# Patient Record
Sex: MALE | Race: WHITE | NOT HISPANIC OR LATINO | Employment: FULL TIME | URBAN - METROPOLITAN AREA
[De-identification: names, ages, dates, MRNs, and addresses within clinical notes are randomized per-mention and may not be internally consistent; named-entity substitution may affect disease eponyms.]

---

## 2017-01-16 ENCOUNTER — ALLSCRIPTS OFFICE VISIT (OUTPATIENT)
Dept: OTHER | Facility: OTHER | Age: 42
End: 2017-01-16

## 2017-06-15 DIAGNOSIS — Z13.1 ENCOUNTER FOR SCREENING FOR DIABETES MELLITUS: ICD-10-CM

## 2017-06-15 DIAGNOSIS — E78.5 HYPERLIPIDEMIA: ICD-10-CM

## 2018-01-14 VITALS
WEIGHT: 266.25 LBS | OXYGEN SATURATION: 98 % | HEIGHT: 70 IN | TEMPERATURE: 98.7 F | BODY MASS INDEX: 38.12 KG/M2 | HEART RATE: 90 BPM | SYSTOLIC BLOOD PRESSURE: 126 MMHG | DIASTOLIC BLOOD PRESSURE: 78 MMHG

## 2018-01-15 NOTE — PROGRESS NOTES
Assessment    1  Back pain (724 5) (M54 9)   2  Neck pain (723 1) (M54 2)   3  Hand numbness (782 0) (R20 0)   4  Abnormal brain MRI (793 0) (R90 89)    Plan  Abnormal brain MRI    · * MRI BRAIN W WO CONTRAST; Status:Need Information - Financial Authorization; Requested for:[id number];    Perform:[facility] Radiology; CKK:[id number]; Last Updated By:Angelia Godoy; 3/22/2016 1:46:16 PM;Ordered; For:Abnormal brain MRI; Ordered By:Rakan Knowles;   · Follow-up visit in 6 months Evaluation and Treatment  Follow-up  Status: Complete   Done: 45JUN0625   Ordered; For: Abnormal brain MRI; Ordered By: Eric Oquendo Performed:  Due: 63BCL2526; Last Updated By: Se Nguyen; 3/22/2016 1:50:20 PM  Back pain    · *1 - [address] Physician Referral  Consult  Status: Active  Requested  for: 25GLB9572   Ordered; For: Back pain; Ordered By: Eric Oquendo Performed:  Due: 08GRE8229; Last Updated By: Se Nguyen; 3/22/2016 1:47:11 PM  Care Summary provided  : Yes    Discussion/Summary  Discussion Summary:   1) Neck pain/upper back pain and syrinx: Patient is following up with neurosurgery in regards to the syrinx  In regard to the degenerative disc disease at this time no surgery indicated per neurosurgery  We'll refer patient to pain management  Patient has tried physical therapy in the past but was not very helpful  2) Low-back pain: No significant disc herniation noted  Patient mentions of neurosurgery informed him about it at that cord syndrome  Patient following up with neurosurgery  Recommend continue with home exercises and we'll refer patient to pain management  3) Syrinx on MRI of the cervical spine: MRI cervical and thoracic spine with contrast did not show any evidence of any mass lesions  Patient periodically following up with neurosurgery  4) Impaired vibration sensation in the feet: ? Due to wear and tear and age related  EMG was normal and there is no evidence of neuropathy   blood work for treatable causes of peripheral neuropathy was unremarkable  Protective footwear is recommended  Good foot hygiene is recommended  Patient Guardian understands agrees: The treatment plan was reviewed with the patient/guardian  The patient/guardian understands and agrees with the treatment plan   Counseling Documentation With Imm: The patient was counseled regarding instructions for management, patient and family education  total time of encounter was 25 minutes and 15 minutes was spent counseling  Chief Complaint  Chief Complaint Free Text Note Form: Follow up appt      History of Present Illness  HPI: Patient is coming for follow-up after about a year in regards to back pain, neck pain, tingling  Brief History:   Patient is a 36 y/o gentle man who is coming for follow up for his back pain, neck pain and tingling  Patient says that he used to see a neurologist 10-15 years ago, however due to insurance issues could not follow up with him  Back Pain: Back pain has a chronic low back pain, for several years which at times has acute exacerbation  Patient says that the pain is dull pain but can get sharp, 7-8/10, occasionally radiates into his right lower extremity with occasional tinging in the right leg  His symptoms are worse on lifting things  Patient denies any urinary or bowel accidents  Patient had disc disease in the past and the neurosurgeon had denies any surgery several years ago  PT did not help him  He says he had pain injections several years ago  Neck Pain: Patient says that neck pain is not as bad as the back pain  He gets it once in a while, 4/10  He mentions he was evaluated several years ago and had a syrinx in the past and used to get MRI in past every six months  He all used to have diplopia on turning his head, now that not there anymore  He denies any radiation into the upper extremities     Numbness in hands: Patient complaints of tingling and numbness that is intermittent of both hands mostly in the first three digits  He also noticed that he gets weak in his hands  He notices that it gets worse on driving  He mentions that years ago he used to use splints for possibly right hand for carpal tunnel syndrome  He denies any prolonged episodes of blurred vision  Patient also complaints of headaches that are occasional, once a week  Headache are describe as mild, all over the head, sometimes is pressure like, 4/10  Occasionally associated with nausea and photophobia  He denies any phonophobia, vomiting, flashes of light  He takes Tylenol or Advil that help him  He had taken Imitrex in the past but that caused side effects like nausea  He tried to PT but was not helping him  MRI brain showed single hyperintense lesion in right frontal lobe  MRI cervical spine shows syrinx ( History of syrinx for a long time)  MRI lumbar spine showed minimal degenerative changes  EMG showed no evidence of CTS and no neuropathy, possible mild left L4 radiculopathy  Patient did not obtain the blood work ordered at the last visit as it was difficult for him to go fasting due to his work timings  Interval history:  Patient says that neck pain or back pain has not changed significantly since the last visit  He tried PT in the past but did not help  He mentions that he was told by neurosurgery that he has tethered cord  He is following with neurosurgery for the syrinx  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, 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 to caudal T7 level approaching 5 MM greatest transverse dimension limited contrasted MRI thoracic spine is suggested to exclude the likelihood of neoplasm  MRI thoracic spine with and without contrast on 4/22/15 showed thoracic syrinx D4-T7 is overall smaller than on previous study and no underlying enhancing mass lesion, mild spondylolytic changes are stable, no new disc herniation   MRI of the cervical spine on 4/22/15 showed multilevel cervical spondylosis similar to prior study, no new disc herniation, small syrinx at C6-C7 unchanged, no abnormal enhancement, no intramedullary mass lesion  MRI brain on 8/29/15 showed single tiny T2 and FLAIR hyperintense focus involving subcortical white matter might to posterior right frontal convexity and although nonspecific findings there are suspicious for mild chronic microangiopathic changes which may be associated with migraine headache and clinical correlation is recommended  ESR 1, Lyme and negative, LDL cholesterol 132, random glucose 86, ALT 48, AST 24, SPEP normal no monoclonal bands noted, folate 11 2, SSA, SSB normal, WILLIE screen negative, TSH 0 57, vitamin B 12 4/26, vitamin E 10 9, vitamin B 616 2, vitamin E 10 9  Patient started on cholesterol pills (simvastatin) per his PCP      Review of Systems  Neurological ROS:   Constitutional: recent weight gain  HEENT: sinus problems  Cardiovascular:  no chest pain or pressure, no palpitations present, the heart rate was not rapid or irregular, no swelling in the arms or legs, no poor circulation  Respiratory: shortness of breath with or without exertion  Gastrointestinal:  no nausea, no vomiting, no diarrhea, no abdominal pain, no changes in bowel habits, no melena, no loss of bowel control  Genitourinary:  no incontinence, no feelings of urinary urgency, no increase in frequency, no urinary hesitancy, no dysuria, no hematuria  Musculoskeletal: arthralgias, myalgias and head/neck/back pain  Integumentary  no masses, no rash, no skin lesions, no livedo reticularis  Psychiatric:  no anxiety, no depression, no mood swings, no psychiatric hospitalizations, no sleep problems  Endocrine   no unusual weight loss or gain, no excessive urination, no excessive thirst, no hair loss or gain, no hot or cold intolerance, no menstrual period change or irregularity, no loss of sexual ability or drive, no erection difficulty, no nipple discharge  Hematologic/Lymphatic:  no unusual bleeding, no tendency for easy bruising, no clotting skin or lumps  Neurological General: headache  Neurological Mental Status: memory problems  Neurological Cranial Nerves:  no blurry or double vision, no loss of vision, no face drooping, no facial numbness or weakness, no taste or smell loss/changes, no hearing loss or ringing, no vertigo or dizziness, no dysphagia, no slurred speech  Neurological Motor findings include:  no tremor, no twitching, no cramping(pre/post exercise), no atrophy  Neurological Coordination:  no unsteadiness, no vertigo or dizziness, no clumsiness, no problems reaching for objects  Neurological Sensory: numbness and tingling  Neurological Gait:  no difficulty walking, not falling to one side, no sensation of being pushed, has not had falls  ROS Reviewed:   ROS reviewed  Active Problems    1  Back pain (724 5) (M54 9)   2  Callus of foot (700) (L84)   3  Esophageal reflux (530 81) (K21 9)   4  Facial weakness (781 94) (R29 810)   5  Hand numbness (782 0) (R20 0)   6  Headache (784 0) (R51)   7  Hordeolum externum of left eye (373 11) (H00 016)   8  Hyperlipidemia (272 4) (E78 5)   9  Left facial numbness (782 0) (R20 0)   10  Lipid screening (V77 91) (Z13 220)   11  Lower extremity numbness (782 0) (R20 0)   12  Lump of skin (782 2) (R22 9)   13  Neck pain (723 1) (M54 2)   14  Need for Tdap vaccination (V06 1) (Z23)   15  Nontraumatic tear of right rotator cuff (727 61) (M75 101)   16  Right foot pain (729 5) (M79 671)   17  Right shoulder pain (719 41) (M25 511)   18  Syrinx of spinal cord (336 0) (G95 0)   19  Tendinitis of left rotator cuff (726 10) (M75 82)   20  Tethered spinal cord (742 59) (Q06 8)    Past Medical History    1  History of Anxiety and depression (300 4) (F41 8)   2  History of Difficulty breathing (786 09) (R06 89)   3  History of Fracture of both hands (815 00) (S62 91XA,S62 92XA)   4   History of Hair loss (704 00) (L65 9)   5  History of depression (V11 8) (Z86 59)   6  History of esophageal reflux (V12 79) (Z87 19)   7  History of heartburn (V12 79) (Z87 898)   8  History of hiatal hernia (V12 79) (Z87 19)   9  History of shortness of breath (V13 89) (Z87 898)   10  History of sleep disturbance (V13 89) (Z87 898)   11  History of Indigestion (536 8) (K30)   12  History of Migraine headache (346 90) (G43 909)   13  History of Nasal congestion (478 19) (R09 81)   14  History of Night sweats (780 8) (R61)   15  History of Starting and stopping of urinary stream during micturition (788 69) (R39 19)   16  History of Stomach problems (536 9) (K31 9)   17  History of Weight gain (783 1) (R63 5)  Active Problems And Past Medical History Reviewed: The active problems and past medical history were reviewed and updated today  Surgical History    1  History of Appendectomy   2  History of Complete Colonoscopy   3  History of Diagnostic Esophagogastroduodenoscopy   4  History of Hand Surgery   5  History of Nasal Septal Deviation Repair  Surgical History Reviewed: The surgical history was reviewed and updated today  Family History    1  No pertinent family history    2  Family history of malignant neoplasm (V16 9) (Z80 9)    3  Family history of Carcinoma Of The Pancreas    4  Family history of    5  Family history of dementia (V17 2) (Z81 8)    6  Family history of Colon Cancer (V16 0)    7  Family history of   Family History Reviewed: The family history was reviewed and updated today  Social History    · Alcohol use (V49 89) (Z78 9)   · Caffeine use (V49 89) (F15 90)   · Current every day smoker (305 1) (F17 200)   · Currently sexually active   · High school or GED   · History of drug use (305 93) (F19 21)   · Lives with roommates   · No known STD risk factors   · Occupation   · Recently    · Social alcohol use (Z78 9)  Social History Reviewed:  The social history was reviewed and updated today  Current Meds   1  Ibuprofen 200 MG Oral Tablet; TAKE 4 TABLET Every 4 hours PRN For pain; Therapy: (Recorded:08Apr2015) to Recorded   2  Migraine Formula 250-250-65 MG Oral Tablet; TAKE 2 TABLET Every 4 hours PRN for HA; Therapy: (Recorded:08Apr2015) to Recorded   3  Omeprazole 20 MG Oral Capsule Delayed Release; TAKE 1 CAPSULE DAILY; Therapy: 27RPM3695 to (Evaluate:98Sbk7867)  Requested for: 59TJO2047; Last   Rx:96Phd2789 Ordered   4  Polymyxin B-Trimethoprim 19022-1 1 UNIT/ML-% Ophthalmic Solution; INSTILL 1 DROP   IN AFFECTED EYE(S) EVERY 4-6 HOURS; Therapy: 80XDH9903 to (Evaluate:19Jan2016)  Requested for: 12Jan2016; Last   Rx:12Jan2016 Ordered   5  Simvastatin 20 MG Oral Tablet; Take 1 tablet daily; Therapy: 53OMZ0672 to (Geroldine January)  Requested for: 19LWQ5120; Last   Rx:30Jan2016 Ordered  Medication List Reviewed: The medication list was reviewed and updated today  Allergies    1  No Known Drug Allergies    Vitals  Signs [Data Includes: Current Encounter]   Recorded: 76EPG2951 01:03PM   Temperature: 98 1 F  Heart Rate: 99  Respiration: 18  Systolic: 156  Diastolic: 67  Height: 5 ft 10 in  Weight: 275 lb 12 oz  BMI Calculated: 39 57  BSA Calculated: 2 39    Physical Exam        General examination:  Patient is awake and alert  Eyes: Conjunctiva and sclera are clear  HEENT: External examination is normal  Neck: Supple  Lungs: Clear to auscultation bilaterally  CVS: S1, S2 heard  Abdomen: Soft, nontender  Extremities: No clubbing, edema, cyanosis  Skin: No rashes  Neurological examination:   Mental status: Patient is awake, alert, oriented to time place and person  Attention, concentration, fund of knowledge is intact  Language: No evidence of aphasia or dysarthria  Memory: Repetition 3/3 and recall 3/3  Cranial nerves: Pupils equal, reacting to light and accommodation  Extraocular movements intact  Visual fields are full   Fundi are difficult to visualize bilaterally  Facial sensation is intact  No facial weakness is noted  Finger rub test is intact bilaterally  Tongue and uvula are in midline  Gag is intact  Shoulder shrug is 5/5 bilaterally  Motor examination: Tone is normal  No atrophy noted  Strength is 5/5 throughout  Sensory examination: Light touch is intact  Pinprick  temperature are impaired in a stocking distribution up to the mid calves bilaterally  Vibration is impaired at the level of the toes  Proprioception is intact  Tinel's and Phalen's sign are negative    Deep tendon reflexes: 2 throughout  Plantars are downgoing bilaterally  Coordination: Finger-nose test and finger tapping intact bilaterally  Heel-to-shin test is intact bilaterally  Gait: Patient has a normal base and stride  Results/Data  Diagnostic Studies Reviewed:   MRI Review 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, 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 to caudal T7 level approaching 5 MMN greatest transverse dimension limited contrasted MRI thoracic spine is suggested to exclude the likelihood of neoplasm  MRI thoracic spine with and without contrast on 4/22/15 showed thoracic syrinx D4-T7 is overall smaller than on previous study and no underlying enhancing mass lesion, mild spondylolytic changes are stable, no new disc herniation  MRI of the cervical spine on 4/22/15 showed multilevel cervical spondylosis similar to prior study, no new disc herniation, small syrinx at C6-C7 unchanged, no abnormal enhancement, no intramedullary mass lesion  MRI brain on 8/29/15 showed single tiny T2 and FLAIR hyperintense focus involving subcortical white matter might to posterior right frontal convexity and although nonspecific findings there are suspicious for mild chronic microangiopathic changes which may be associated with migraine headache and clinical correlation is recommended         Diagnostic Review ESR 1, Lyme and negative, LDL cholesterol 132, random glucose 86, ALT 48, AST 24, SPEP normal no monoclonal bands noted, folate 11 2, SSA, SSB normal, WILLIE screen negative, TSH 0 57, vitamin B 12/4/26, vitamin E 10 9, vitamin B 616 2, vitamin E 10 9  Results   * MRI Brain With and Without Contrast [id number] 11:48AM Chrystine Magic     Test Name Result Flag Reference    [address] W/O (Report)     [facility] [address] [place];;[place];[id number]   08/29/2015 1152   08/29/2015 1225   N/A     MRI BRAIN WITH AND WITHOUT CONTRAST     INDICATION- Headaches, previous left-sided facial weakness has resolved   COMPARISON- 12/5/2014 MRI     TECHNIQUE-   Sagittal T1, axial T2, axial FLAIR, axial T1, axial gradient imaging,   axial diffusion  Sagittal, axial and coronal T1 weighted images were   obtained  Exam was performed pre- and postintravenous administration of   12 cc Gadavist      IMAGE QUALITY-  Diagnostic  FINDINGS-     BRAIN PARENCHYMA-    Again evident is single tiny focus of T2 and FLAIR hyperintensity   involving the subcortical white matter of the mid to posterior right   frontal convexity  The appearance is nonspecific  It may represent   mild chronic microangiopathic change such as may occur with migraine   headaches  Clinical correlation recommended  There is no discrete mass effect or midline shift  Brainstem and   cerebellum demonstrate normal signal  Diffusion imaging is   unremarkable  VENTRICLES- Normal      POSTCONTRAST IMAGING-    Postcontrast imaging of the brain demonstrates no abnormal enhancement  SELLA AND PITUITARY GLAND- Normal      ORBITS- Normal      PARANASAL SINUSES- The paranasal sinuses are clear  VASCULATURE-    Evaluation of the major intracranial vasculature demonstrates   appropriate flow voids       CALVARIUM AND SKULL BASE- Normal      EXTRACRANIAL SOFT TISSUES- Normal      IMPRESSION-   Single tiny T2 and FLAIR hyperintense focus involving subcortical white   matter mid to posterior right frontal convexity  Although nonspecific,   finding suspicious for mild chronic microangiopathic change which may   be associated with migraine headaches  Clinical correlation recommended     Otherwise, unremarkable exam     Similar to previous MRI study             Transcribed on- [id number]     - SUMMER Armendariz MD   [place] Radiologist- SUMMER Armendariz MD   Electronically Signed- SUMMER Armendariz MD   Released Date Time- 08/31/15 1304   ------------------------------------------------------------------------------   [id number]^JOLANTA HERBERT   [id number]^JOLANTA HERBERT     (1) LYME ANTIBODY PROFILE W/REFLEX TO WESTERN BLOT 11Aug2015 09:44AM Zamorano Speedy Order Number: [id number]     Test Name Result Flag Reference   Lyme IgG Quant 0 63  0 00-0 79   LYME IGG Negative  Negative   Lyme IgM Quant 0 42  0 00-0 79   LYME IGM Negative  Negative   LYME ANTIBODY PROFILE, EIA INTERPRETATION    NEGATIVE (0 00 - 0 79)  Absence of detectable Borrelia IgG and/or IgM antibodies  A negative  result does not exclude the possibility of Borrelia infection  If early  Lyme disease is suspected, a second sample should be collected and  tested 4 weeks after initial testing  EQUIVOCAL (0 80 - 1 19)  Current testing guidelines recommend that all equivocal samples be  supplemented by further testing  Sample forwarded to reference lab for  Western Blot assay  POSITIVE ( > or equal to 1 20)  Presence of detectable Borrelia IgG and/or IgM antibodies  Current  testing guidelines recommend that all positive samples be supplemented  by further testing  Sample forwarded to reference lab for Western Blot  assay       (1) SED RATE 11Aug2015 09:44AM Zamorano Speedy Order Number: [id number]     Test Name Result Flag Reference   SED RATE 1 mm/hour  0-9     * MRI Spine Cervical With and Without Contrast 22Apr2015 08:32PM Bobby Leak     Test Name Result Flag Reference   MRI CSpine  W/ & W/O (Report)     [facility] [address];;[place];[id number]   04/22/2015 2040 04/22/2015 2150   N/A     Please note that there is a voice recognition error in the impression  In the 2nd impression the word SEARINGS should be replaced with a   SYRINX  Transcribed on- [id number]   SUMMER Disla MD   Addendum [place] Radiologist- SUMMER Long MD   Addendum Electronically SUMMER Alegria MD   Addendum Released Date Time- 05/19/15 1449   ------------------------------------------------------------------------------     MRI CERVICAL SPINE WITH AND WITHOUT CONTRAST     INDICATION- 724 5, 336 0, 782 0, 784 0 History- increasing chronic   low back pain with numbness in both upper and lower extremities     COMPARISON- 12/05/2014     TECHNIQUE- Sagittal T1, sagittal T2, sagittal inversion recovery,   axial 2D merge and axial T2  Sagittal T1 and axial T1   postcontrast  12 mL of Gadavist was administered without immediate   consequence  IMAGE QUALITY- Diagnostic  FINDINGS-     ALIGNMENT- Normal alignment of the cervical spine  No compression   fracture  No subluxation  No scoliosis  MARROW SIGNAL- Normal marrow signal is identified within the   visualized bony structures  No discrete marrow lesion  CERVICAL AND VISUALIZED UPPER THORACIC CORD- The appearance of the   spinal cord is stable with a small spindle-shaped non-expansile   intramedullary linear T2 hyperintensity at the C6-C7 intervertebral   disc space, measuring approximately 12 mm in maximal craniocaudal   dimension, compatible with a small syrinx cavity  No abnormal   enhancement  PREVERTEBRAL AND PARASPINAL SOFT TISSUES- Small retention cyst in the   floor the right maxillary sinus       VISUALIZED POSTERIOR FOSSA- The visualized posterior fossa   demonstrates no abnormal signal      CERVICAL DISC SPACES-        C2-C3- Normal      C3-C4- Normal      C4-C5- There is a diffuse disk bulge  No significant central canal or   neural foraminal narrowing  This level is similar to the prior study  C5-C6- There is a disc osteophyte complex with a small superimposed   right paracentral disc protrusion  Mild tricompartmental narrowing  This level is similar to the prior study  C6-C7- There is a disc osteophyte complex with mild tricompartmental   narrowing  This level is similar to the prior study  C7-T1- There is a small right paracentral disc herniation, protrusion   type  There is mild central canal and right neural foraminal   narrowing  Left neural foramen patent  This level is similar to the   prior study  UPPER THORACIC DISC SPACES- See separate thoracic MRI     POSTCONTRAST IMAGING- Normal      IMPRESSION-     1  Multilevel cervical spondylosis is similar to the previous study  No new disc herniation  2  Small searings at C6-C7 unchanged  No abnormal enhancement  No   intramedullary mass lesion  Transcribed on- [id number]     SUMMER Falcon MD   [place] Radiologist- SUMMER Cox MD   Electronically Signed- SUMMER Cox MD   Released Date Time- 04/23/15 1347   ------------------------------------------------------------------------------   [id number]^ANDREW Ricard Schirmer   [id number]^RRSNPF Ricard Schirmer     * MRI Spine Thoracic With and Without Contrast 22Apr2015 08:32PM Anuja Sanchez     Test Name Result Flag Reference   MRI TSpine W/ & W/O (Report)     [facility] [address] [place];;[place];[id number]   04/22/2015 2040 04/22/2015 2150   N/A     MRI THORACIC SPINE WITH AND WITHOUT CONTRAST     INDICATION- 724 5, 336 0, 782 0, 784 0 increasing chronic low back   pain with numbness in both upper and lower extremities      COMPARISON- 04/03/2015     TECHNIQUE- Sagittal T1, sagittal T2, sagittal inversion recovery,   axial T2 axial 2D merge   Sagittal and axial T1 postcontrast    12 mL of Gadavist was injected intravenously without immediate   consequence  IMAGE QUALITY- Diagnostic  FINDINGS-     ALIGNMENT- Normal alignment of the thoracic spine  No compression   fracture  No subluxation  No evidence of scoliosis  MARROW SIGNAL- Mild degenerative endplate changes noted  No   compression abnormality or bone marrow edema or focally suspicious   marrow lesions  THORACIC CORD- The syrinx cavity extending from the T4 level to the T7   level has diminished in size from the previous study  At its maximal   AP diameter of the teeth 5 T6 level it measures 2 mm  Previously the   maximal transverse dimension of the syrinx cavity was 3 mm  There is   no abnormal enhancement  PREVERTEBRAL AND PARASPINAL SOFT TISSUES- Prevertebral and paraspinal   soft tissues are unremarkable  THORACIC DEGENERATIVE CHANGE- T5-T6 tiny central disc protrusion   without significant central canal or neural foraminal narrowing  T7-T8 tiny central disc herniation, protrusion type with mild central   canal narrowing  Neural foramina bilaterally patent  Other levels   free of disc herniation  POSTCONTRAST- No abnormal enhancement  IMPRESSION-     1  Thoracic syrinx T4-T7 is overall smaller than on the previous   study  No underlying enhancing mass lesion  2  Mild spondylotic changes are stable  No new disc herniation         Transcribed on- [id number]     Shareeion Hamman, RAD MD   Reading Radiologist- SUMMER Santizo MD   Electronically SUMMER Shah MD   Released Date Time- 04/23/15 1401   ------------------------------------------------------------------------------   [id number] Catina Heath   [id number] Catina Heath     Future Appointments    Date/Time Provider Specialty Site   10/25/2016 01:00 PM Chayito Rubio MD Neurology [facility]   05/17/2016 01:00 PM Ceferino Trinidad, [facility] Neurosurgery [facility]   05/17/2016 01:45 PM SUE Panchal   Neurosurgery [facility] NEUROSURGICAL     Signatures   Electronically signed by : Zack Murray MD; Mar 22 2016  3:43PM EST                       (Author)

## 2019-03-12 ENCOUNTER — OFFICE VISIT (OUTPATIENT)
Dept: FAMILY MEDICINE CLINIC | Facility: CLINIC | Age: 44
End: 2019-03-12
Payer: COMMERCIAL

## 2019-03-12 VITALS
TEMPERATURE: 98.4 F | BODY MASS INDEX: 36.57 KG/M2 | OXYGEN SATURATION: 96 % | WEIGHT: 261.2 LBS | SYSTOLIC BLOOD PRESSURE: 130 MMHG | DIASTOLIC BLOOD PRESSURE: 80 MMHG | HEIGHT: 71 IN | HEART RATE: 94 BPM

## 2019-03-12 DIAGNOSIS — Z72.0 CURRENTLY ATTEMPTING TO QUIT SMOKING: Primary | ICD-10-CM

## 2019-03-12 PROCEDURE — 99213 OFFICE O/P EST LOW 20 MIN: CPT | Performed by: NURSE PRACTITIONER

## 2019-03-12 PROCEDURE — 3008F BODY MASS INDEX DOCD: CPT | Performed by: NURSE PRACTITIONER

## 2019-03-12 RX ORDER — BUPROPION HYDROCHLORIDE 150 MG/1
150 TABLET, EXTENDED RELEASE ORAL 2 TIMES DAILY
Qty: 60 TABLET | Refills: 2 | Status: SHIPPED | OUTPATIENT
Start: 2019-03-12 | End: 2019-06-10

## 2019-03-12 NOTE — PROGRESS NOTES
Assessment/Plan:    No problem-specific Assessment & Plan notes found for this encounter  Diagnoses and all orders for this visit:    Currently attempting to quit smoking  Comments:  will start the zyban for smoking cessation   Orders:  -     buPROPion (ZYBAN) 150 MG 12 hr tablet; Take 1 tablet (150 mg total) by mouth 2 (two) times a day for 90 days          Subjective:      Patient ID: Korina Lindsey is a 53 y o  male  Patient here today to review his smoking and reports that he is smoking about 1 pack a day for the past 28 years and has not ever stopped smoking  Patient is here at encouragement of wife and interest in stopping smoking patient has not ever tried to stop smoking with patches or gum and is concerned about the Chantix r/t potential side effects  Patient used to drive truck and smoked lots  The following portions of the patient's history were reviewed and updated as appropriate:   He  has a past medical history of Anxiety and depression, Depression, Esophageal reflux, Fracture of both hands, Hair loss, Heartburn, Hiatal hernia, Night sweats, Sleep disturbance, Starting and stopping of urinary stream during micturition, Stomach problems, and Weight gain  He   Patient Active Problem List    Diagnosis Date Noted    Currently attempting to quit smoking 03/12/2019     He  has a past surgical history that includes Appendectomy (1984); Colonoscopy; Esophagogastroduodenoscopy; Hand surgery; and Nose surgery  His family history includes Colon cancer in his maternal grandfather; Dementia in his paternal grandmother; No Known Problems in his mother; Pancreatic cancer in his maternal grandmother  He  reports that he has been smoking cigarettes  He has a 25 00 pack-year smoking history  He has never used smokeless tobacco  He reports that he drinks alcohol  He reports that he has current or past drug history  He has No Known Allergies       Review of Systems   Constitutional: Negative for activity change, appetite change, chills, diaphoresis, fatigue, fever and unexpected weight change  HENT: Negative for congestion, ear pain, hearing loss, postnasal drip, sinus pressure, sinus pain, sneezing and sore throat  Eyes: Negative for pain, redness and visual disturbance  Respiratory: Negative for cough and shortness of breath  Cardiovascular: Negative for chest pain and leg swelling  Gastrointestinal: Negative for abdominal pain, diarrhea, nausea and vomiting  Musculoskeletal: Negative for arthralgias  Neurological: Negative for dizziness and light-headedness  Psychiatric/Behavioral: Negative for behavioral problems and dysphoric mood  Objective:      /80 (BP Location: Left arm, Patient Position: Sitting)   Pulse 94   Temp 98 4 °F (36 9 °C) (Tympanic)   Ht 5' 11" (1 803 m)   Wt 118 kg (261 lb 3 2 oz)   SpO2 96%   BMI 36 43 kg/m²          Physical Exam   Constitutional: He is oriented to person, place, and time  Vital signs are normal  He appears well-developed and well-nourished  No distress  HENT:   Head: Normocephalic and atraumatic  Eyes: Pupils are equal, round, and reactive to light  Neck: Normal range of motion  No thyromegaly present  Cardiovascular: Normal rate, regular rhythm, normal heart sounds and intact distal pulses  No murmur heard  Pulmonary/Chest: Effort normal and breath sounds normal  No respiratory distress  He has no wheezes  Abdominal: Soft  Bowel sounds are normal    Musculoskeletal: Normal range of motion  Neurological: He is alert and oriented to person, place, and time  Skin: Skin is warm and dry  Psychiatric: He has a normal mood and affect  Nursing note and vitals reviewed

## 2019-06-07 ENCOUNTER — OFFICE VISIT (OUTPATIENT)
Dept: FAMILY MEDICINE CLINIC | Facility: CLINIC | Age: 44
End: 2019-06-07
Payer: COMMERCIAL

## 2019-06-07 VITALS
BODY MASS INDEX: 37.24 KG/M2 | OXYGEN SATURATION: 96 % | SYSTOLIC BLOOD PRESSURE: 116 MMHG | WEIGHT: 266 LBS | HEART RATE: 88 BPM | HEIGHT: 71 IN | TEMPERATURE: 98.5 F | DIASTOLIC BLOOD PRESSURE: 82 MMHG

## 2019-06-07 DIAGNOSIS — Z87.891 QUIT SMOKING WITHIN PAST YEAR: Primary | ICD-10-CM

## 2019-06-07 DIAGNOSIS — E66.09 CLASS 2 OBESITY DUE TO EXCESS CALORIES WITHOUT SERIOUS COMORBIDITY WITH BODY MASS INDEX (BMI) OF 37.0 TO 37.9 IN ADULT: ICD-10-CM

## 2019-06-07 DIAGNOSIS — B37.89 CANDIDA RASH OF GROIN: ICD-10-CM

## 2019-06-07 PROBLEM — Z72.0 CURRENTLY ATTEMPTING TO QUIT SMOKING: Status: RESOLVED | Noted: 2019-03-12 | Resolved: 2019-06-07

## 2019-06-07 PROCEDURE — 3008F BODY MASS INDEX DOCD: CPT | Performed by: FAMILY MEDICINE

## 2019-06-07 PROCEDURE — 99214 OFFICE O/P EST MOD 30 MIN: CPT | Performed by: FAMILY MEDICINE

## 2019-06-07 PROCEDURE — 1036F TOBACCO NON-USER: CPT | Performed by: FAMILY MEDICINE

## 2019-06-07 RX ORDER — NYSTATIN 100000 U/G
CREAM TOPICAL 2 TIMES DAILY
Qty: 30 G | Refills: 2 | Status: SHIPPED | OUTPATIENT
Start: 2019-06-07

## 2019-06-07 RX ORDER — OMEPRAZOLE 20 MG/1
1 CAPSULE, DELAYED RELEASE ORAL DAILY
COMMUNITY
Start: 2013-10-24

## 2019-09-06 ENCOUNTER — HOSPITAL ENCOUNTER (EMERGENCY)
Facility: HOSPITAL | Age: 44
Discharge: HOME | End: 2019-09-06
Attending: EMERGENCY MEDICINE | Admitting: EMERGENCY MEDICINE
Payer: COMMERCIAL

## 2019-09-06 VITALS
TEMPERATURE: 98.3 F | WEIGHT: 282.63 LBS | HEART RATE: 89 BPM | RESPIRATION RATE: 16 BRPM | BODY MASS INDEX: 39.57 KG/M2 | OXYGEN SATURATION: 97 % | HEIGHT: 71 IN | SYSTOLIC BLOOD PRESSURE: 134 MMHG | DIASTOLIC BLOOD PRESSURE: 63 MMHG

## 2019-09-06 DIAGNOSIS — G89.29 ACUTE EXACERBATION OF CHRONIC LOW BACK PAIN: Primary | ICD-10-CM

## 2019-09-06 DIAGNOSIS — M54.50 ACUTE EXACERBATION OF CHRONIC LOW BACK PAIN: Primary | ICD-10-CM

## 2019-09-06 LAB
ALBUMIN SERPL BCP-MCNC: 4.1 G/DL (ref 3.5–5)
ALP SERPL-CCNC: 76 U/L (ref 46–116)
ALT SERPL W P-5'-P-CCNC: 48 U/L (ref 12–78)
ANION GAP SERPL CALCULATED.3IONS-SCNC: 6 MMOL/L (ref 4–13)
AST SERPL W P-5'-P-CCNC: 23 U/L (ref 5–45)
BASOPHILS # BLD AUTO: 0.05 THOUSANDS/ΜL (ref 0–0.1)
BASOPHILS NFR BLD AUTO: 1 % (ref 0–1)
BILIRUB SERPL-MCNC: 0.4 MG/DL (ref 0.2–1)
BILIRUB UR QL STRIP: NEGATIVE
BUN SERPL-MCNC: 12 MG/DL (ref 5–25)
CALCIUM SERPL-MCNC: 9.3 MG/DL (ref 8.3–10.1)
CHLORIDE SERPL-SCNC: 104 MMOL/L (ref 100–108)
CLARITY UR: CLEAR
CO2 SERPL-SCNC: 27 MMOL/L (ref 21–32)
COLOR UR: YELLOW
CREAT SERPL-MCNC: 1.02 MG/DL (ref 0.6–1.3)
EOSINOPHIL # BLD AUTO: 0.32 THOUSAND/ΜL (ref 0–0.61)
EOSINOPHIL NFR BLD AUTO: 4 % (ref 0–6)
ERYTHROCYTE [DISTWIDTH] IN BLOOD BY AUTOMATED COUNT: 12.1 % (ref 11.6–15.1)
GFR SERPL CREATININE-BSD FRML MDRD: 88 ML/MIN/1.73SQ M
GLUCOSE SERPL-MCNC: 99 MG/DL (ref 65–140)
GLUCOSE UR STRIP-MCNC: NEGATIVE MG/DL
HCT VFR BLD AUTO: 46.8 % (ref 36.5–49.3)
HGB BLD-MCNC: 16.1 G/DL (ref 12–17)
HGB UR QL STRIP.AUTO: NEGATIVE
IMM GRANULOCYTES # BLD AUTO: 0.02 THOUSAND/UL (ref 0–0.2)
IMM GRANULOCYTES NFR BLD AUTO: 0 % (ref 0–2)
KETONES UR STRIP-MCNC: NEGATIVE MG/DL
LEUKOCYTE ESTERASE UR QL STRIP: NEGATIVE
LYMPHOCYTES # BLD AUTO: 1.78 THOUSANDS/ΜL (ref 0.6–4.47)
LYMPHOCYTES NFR BLD AUTO: 21 % (ref 14–44)
MCH RBC QN AUTO: 30.5 PG (ref 26.8–34.3)
MCHC RBC AUTO-ENTMCNC: 34.4 G/DL (ref 31.4–37.4)
MCV RBC AUTO: 89 FL (ref 82–98)
MONOCYTES # BLD AUTO: 0.51 THOUSAND/ΜL (ref 0.17–1.22)
MONOCYTES NFR BLD AUTO: 6 % (ref 4–12)
NEUTROPHILS # BLD AUTO: 5.79 THOUSANDS/ΜL (ref 1.85–7.62)
NEUTS SEG NFR BLD AUTO: 68 % (ref 43–75)
NITRITE UR QL STRIP: NEGATIVE
NRBC BLD AUTO-RTO: 0 /100 WBCS
PH UR STRIP.AUTO: 7.5 [PH]
PLATELET # BLD AUTO: 249 THOUSANDS/UL (ref 149–390)
PMV BLD AUTO: 9.6 FL (ref 8.9–12.7)
POTASSIUM SERPL-SCNC: 3.8 MMOL/L (ref 3.5–5.3)
PROT SERPL-MCNC: 7.1 G/DL (ref 6.4–8.2)
PROT UR STRIP-MCNC: NEGATIVE MG/DL
RBC # BLD AUTO: 5.28 MILLION/UL (ref 3.88–5.62)
SODIUM SERPL-SCNC: 137 MMOL/L (ref 136–145)
SP GR UR STRIP.AUTO: 1.01 (ref 1–1.03)
UROBILINOGEN UR QL STRIP.AUTO: 0.2 E.U./DL
WBC # BLD AUTO: 8.47 THOUSAND/UL (ref 4.31–10.16)

## 2019-09-06 PROCEDURE — 96376 TX/PRO/DX INJ SAME DRUG ADON: CPT

## 2019-09-06 PROCEDURE — 36415 COLL VENOUS BLD VENIPUNCTURE: CPT | Performed by: EMERGENCY MEDICINE

## 2019-09-06 PROCEDURE — 96361 HYDRATE IV INFUSION ADD-ON: CPT

## 2019-09-06 PROCEDURE — 80053 COMPREHEN METABOLIC PANEL: CPT | Performed by: EMERGENCY MEDICINE

## 2019-09-06 PROCEDURE — 85025 COMPLETE CBC W/AUTO DIFF WBC: CPT | Performed by: EMERGENCY MEDICINE

## 2019-09-06 PROCEDURE — 96374 THER/PROPH/DIAG INJ IV PUSH: CPT

## 2019-09-06 PROCEDURE — 81003 URINALYSIS AUTO W/O SCOPE: CPT | Performed by: EMERGENCY MEDICINE

## 2019-09-06 PROCEDURE — 99284 EMERGENCY DEPT VISIT MOD MDM: CPT | Performed by: EMERGENCY MEDICINE

## 2019-09-06 PROCEDURE — 99283 EMERGENCY DEPT VISIT LOW MDM: CPT

## 2019-09-06 PROCEDURE — 96375 TX/PRO/DX INJ NEW DRUG ADDON: CPT

## 2019-09-06 RX ORDER — KETOROLAC TROMETHAMINE 30 MG/ML
15 INJECTION, SOLUTION INTRAMUSCULAR; INTRAVENOUS ONCE
Status: COMPLETED | OUTPATIENT
Start: 2019-09-06 | End: 2019-09-06

## 2019-09-06 RX ORDER — METHYLPREDNISOLONE 4 MG/1
TABLET ORAL
Qty: 21 TABLET | Refills: 0 | Status: SHIPPED | OUTPATIENT
Start: 2019-09-06

## 2019-09-06 RX ORDER — METHOCARBAMOL 500 MG/1
500 TABLET, FILM COATED ORAL 3 TIMES DAILY PRN
Qty: 20 TABLET | Refills: 0 | Status: SHIPPED | OUTPATIENT
Start: 2019-09-06

## 2019-09-06 RX ORDER — METHOCARBAMOL 500 MG/1
500 TABLET, FILM COATED ORAL ONCE
Status: COMPLETED | OUTPATIENT
Start: 2019-09-06 | End: 2019-09-06

## 2019-09-06 RX ORDER — NAPROXEN 500 MG/1
500 TABLET ORAL 2 TIMES DAILY WITH MEALS
Qty: 14 TABLET | Refills: 0 | Status: SHIPPED | OUTPATIENT
Start: 2019-09-06 | End: 2019-09-13

## 2019-09-06 RX ORDER — MORPHINE SULFATE 4 MG/ML
4 INJECTION, SOLUTION INTRAMUSCULAR; INTRAVENOUS ONCE
Status: COMPLETED | OUTPATIENT
Start: 2019-09-06 | End: 2019-09-06

## 2019-09-06 RX ORDER — MORPHINE SULFATE 10 MG/ML
8 INJECTION, SOLUTION INTRAMUSCULAR; INTRAVENOUS ONCE
Status: COMPLETED | OUTPATIENT
Start: 2019-09-06 | End: 2019-09-06

## 2019-09-06 RX ADMIN — SODIUM CHLORIDE 1000 ML: 0.9 INJECTION, SOLUTION INTRAVENOUS at 11:36

## 2019-09-06 RX ADMIN — MORPHINE SULFATE 8 MG: 10 INJECTION INTRAVENOUS at 12:57

## 2019-09-06 RX ADMIN — METHOCARBAMOL TABLETS 500 MG: 500 TABLET, COATED ORAL at 12:53

## 2019-09-06 RX ADMIN — MORPHINE SULFATE 4 MG: 4 INJECTION INTRAVENOUS at 11:34

## 2019-09-06 RX ADMIN — KETOROLAC TROMETHAMINE 15 MG: 30 INJECTION, SOLUTION INTRAMUSCULAR at 11:33

## 2019-09-06 NOTE — ED PROVIDER NOTES
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  The pain initially was intermittent and moderate in severity  However the pain has become more constant and more severe  He had to leave work because he could [employer]  The pain occasionally radiates into his thoracic back and into his left buttocks  He denies any radiation into his abdomen  He denies fever, chills, nausea, vomiting, diarrhea, constipation, hematuria or dysuria  He has had similar pain previously which he relates to his herniated discs  He has not taken anything for the pain at home  History provided by:  Patient  Back Pain   Location:  Lumbar spine  Quality:  Aching and shooting  Pain severity:  Moderate  Pain is:  Same all the time  Onset quality:  Gradual  Duration:  1 week  Timing:  Intermittent  Progression:  Worsening  Chronicity:  New  Worsened by:  Bending and movement  Ineffective treatments:  None tried  Associated symptoms: no abdominal pain, no bladder incontinence, no bowel incontinence, no chest pain, no dysuria, no fever, no headaches, no numbness, no paresthesias, no tingling and no weakness        Prior to Admission Medications   Prescriptions Last Dose Informant Patient Reported? Taking?    buPROPion (ZYBAN) 150 MG 12 hr tablet   No No   Sig: Take 1 tablet (150 mg total) by mouth 2 (two) times a day for 90 days   nystatin (MYCOSTATIN) cream   No No   Sig: Apply topically 2 (two) times a day   omeprazole (PriLOSEC) 20 mg delayed release capsule Yes No   Sig: Take 1 capsule by mouth daily      Facility-Administered Medications: None       Past Medical History:   Diagnosis Date    Anxiety and depression     Depression     Esophageal reflux     Fracture of both hands     Hair loss     Heartburn     Hiatal hernia     Resolved 7/24/2015     Night sweats     Sleep disturbance     Starting and stopping of urinary stream during micturition     Stomach problems     Weight gain        Past Surgical History:   Procedure Laterality Date    APPENDECTOMY  1984    COLONOSCOPY      ESOPHAGOGASTRODUODENOSCOPY      Diagnostic     HAND SURGERY      Right in 1998 and left in 1991     NOSE SURGERY      Nasal septal deviation repair        Family History   Problem Relation Age of Onset    No Known Problems Mother     Pancreatic cancer Maternal Grandmother     Colon cancer Maternal Grandfather     Dementia Paternal Grandmother      I have reviewed and agree with the history as documented  Social History     Tobacco Use    Smoking status: Former Smoker     Packs/day: 1 00     Years: 25 00     Pack years: 25 00     Types: Cigarettes    Smokeless tobacco: Never Used   Substance Use Topics    Alcohol use: Yes    Drug use: Not Currently     Comment: Quit - As per [employer]         Review of Systems   Constitutional: Negative for chills, diaphoresis and fever  HENT: Negative for nosebleeds, sore throat and trouble swallowing  Eyes: Negative for photophobia, pain and visual disturbance  Respiratory: Negative for cough, chest tightness and shortness of breath  Cardiovascular: Negative for chest pain, palpitations and leg swelling  Gastrointestinal: Negative for abdominal pain, bowel incontinence, constipation, diarrhea, nausea and vomiting  Endocrine: Negative for polydipsia and polyuria  Genitourinary: Negative for bladder incontinence, difficulty urinating, dysuria and hematuria  Musculoskeletal: Positive for back pain   Negative for neck pain and neck stiffness  Skin: Negative for pallor and rash  Neurological: Negative for dizziness, tingling, syncope, weakness, light-headedness, numbness, headaches and paresthesias  All other systems reviewed and are negative  Physical Exam  Physical Exam   Constitutional: He is oriented to person, place, and time  He appears well-developed and well-nourished  No distress  HENT:   Head: Normocephalic and atraumatic  Mouth/Throat: Oropharynx is clear and moist and mucous membranes are normal    Eyes: Pupils are equal, round, and reactive to light  EOM are normal    Neck: Normal range of motion  Neck supple  Cardiovascular: Normal rate, regular rhythm, normal heart sounds, intact distal pulses and normal pulses  Pulmonary/Chest: Effort normal and breath sounds normal  No respiratory distress  Abdominal: Soft  He exhibits no distension  There is no tenderness  There is no rigidity, no rebound and no guarding  Musculoskeletal: He exhibits no edema or tenderness  Lumbar back: He exhibits decreased range of motion (secondary to pain)  He exhibits no tenderness and no bony tenderness  Lymphadenopathy:     He has no cervical adenopathy  Neurological: He is alert and oriented to person, place, and time  He has normal strength  No cranial nerve deficit or sensory deficit  Skin: Skin is warm and dry  Capillary refill takes less than 2 seconds  Psychiatric: He has a normal mood and affect  Nursing note and vitals reviewed        Vital Signs  ED Triage Vitals   Temperature Pulse Respirations Blood Pressure SpO2   09/06/19 1022 09/06/19 1022 09/06/19 1022 09/06/19 1022 09/06/19 1022   98 3 °F (36 8 °C) 87 20 143/82 95 %      Temp Source Heart Rate Source Patient Position - Orthostatic VS BP Location FiO2 (%)   09/06/19 1022 09/06/19 1202 09/06/19 1202 09/06/19 1202 --   Oral Monitor Sitting Right arm       Pain Score       09/06/19 1022       7           Vitals:    09/06/19 1022 09/06/19 1202 09/06/19 1407   BP: 143/82 132/68 134/63   Pulse: 87 86 89   Patient Position - Orthostatic VS:  Sitting          Visual Acuity      ED Medications  Medications   sodium chloride 0 9 % bolus 1,000 mL (0 mL Intravenous Stopped 9/6/19 1249)   ketorolac (TORADOL) injection 15 mg (15 mg Intravenous Given 9/6/19 1133)   morphine (PF) 4 mg/mL injection 4 mg (4 mg Intravenous Given 9/6/19 1134)   morphine (PF) 10 mg/mL injection 8 mg (8 mg Intravenous Given 9/6/19 1257)   methocarbamol (ROBAXIN) tablet 500 mg (500 mg Oral Given 9/6/19 1253)       Diagnostic Studies  Results Reviewed     Procedure Component Value Units Date/Time    UA w Reflex to Microscopic [[id number]] Collected:  09/06/19 1249    Lab Status:  Final result Specimen:  Urine, Other Updated:  09/06/19 1258     Color, UA Yellow     Clarity, UA Clear     Specific Gravity, UA 1 010     pH, UA 7 5     Leukocytes, UA Negative     Nitrite, UA Negative     Protein, UA Negative mg/dl      Glucose, UA Negative mg/dl      Ketones, UA Negative mg/dl      Urobilinogen, UA 0 2 E U /dl      Bilirubin, UA Negative     Blood, UA Negative    Comprehensive metabolic panel [[id number]] Collected:  09/06/19 1127    Lab Status:  Final result Specimen:  Blood from Arm, Left Updated:  09/06/19 1150     Sodium 137 mmol/L      Potassium 3 8 mmol/L      Chloride 104 mmol/L      CO2 27 mmol/L      ANION GAP 6 mmol/L      BUN 12 mg/dL      Creatinine 1 02 mg/dL      Glucose 99 mg/dL      Calcium 9 3 mg/dL      AST 23 U/L      ALT 48 U/L      Alkaline Phosphatase 76 U/L      Total Protein 7 1 g/dL      Albumin 4 1 g/dL      Total Bilirubin 0 40 mg/dL      eGFR 88 ml/min/1 73sq m     Narrative:       [place] guidelines for Chronic Kidney Disease (CKD):     Stage 1 with normal or high GFR (GFR > 90 mL/min/1 73 square meters)    Stage 2 Mild CKD (GFR = 60-89 mL/min/1 73 square meters)    Stage 3A Moderate CKD (GFR = 45-59 mL/min/1 73 square meters)   Stage 3B Moderate CKD (GFR = 30-44 mL/min/1 73 square meters)    Stage 4 Severe CKD (GFR = 15-29 mL/min/1 73 square meters)    Stage 5 End Stage CKD (GFR <15 mL/min/1 73 square meters)  Note: GFR calculation is accurate only with a steady state creatinine    CBC and differential [[id number]] Collected:  09/06/19 1127    Lab Status:  Final result Specimen:  Blood from Arm, Left Updated:  09/06/19 1134     WBC 8 47 Thousand/uL      RBC 5 28 Million/uL      Hemoglobin 16 1 g/dL      Hematocrit 46 8 %      MCV 89 fL      MCH 30 5 pg      MCHC 34 4 g/dL      RDW 12 1 %      MPV 9 6 fL      Platelets 404 Thousands/uL      nRBC 0 /100 WBCs      Neutrophils Relative 68 %      Immat GRANS % 0 %      Lymphocytes Relative 21 %      Monocytes Relative 6 %      Eosinophils Relative 4 %      Basophils Relative 1 %      Neutrophils Absolute 5 79 Thousands/µL      Immature Grans Absolute 0 02 Thousand/uL      Lymphocytes Absolute 1 78 Thousands/µL      Monocytes Absolute 0 51 Thousand/µL      Eosinophils Absolute 0 32 Thousand/µL      Basophils Absolute 0 05 Thousands/µL                  No orders to display              Procedures  Procedures       ED Course  ED Course as of Sep 06 1514   Fri Sep 06, 2019   1251 Patient states that he has not improved after medications  Pain is tolerable when he is lying still but is worse when he moves around  1258 No hematuria  Unlikely kidney stone  MDM  Number of Diagnoses or Management Options  Acute exacerbation of chronic low back pain: new and requires workup  Diagnosis management comments: Patient with a history of back pain presents with acute exacerbation  Patient was initially concerned kidney stone  However HPI is not consistent with renal colic and urinalysis shows no microscopic hematuria  Given history and physical, suspect musculoskeletal etiology  Neurovascularly intact   Do not suspect AAA, aortic dissection, renal pathology, perforated viscus  Low suspicion for cauda equina or epidural compression syndrome as there is no bowel or bladder dysfunction, bilateral symptoms or saddle paresthesias  No red flag features such as trauma, fever, immunocompromised state, history of malignancy  Do not feel that emergent imaging indicated at this time  Patient ambulated independently  Symptoms improved in the emergency department  Patient advised to follow up with PCP and return to ED if symptoms progress  Amount and/or Complexity of Data Reviewed  Clinical lab tests: ordered and reviewed  Tests in the medicine section of CPT®: ordered and reviewed  Review and summarize past medical records: yes  Independent visualization of images, tracings, or specimens: yes    Risk of Complications, Morbidity, and/or Mortality  Presenting problems: high  Diagnostic procedures: moderate  Management options: high    Patient Progress  Patient progress: stable      Disposition  Final diagnoses:   Acute exacerbation of chronic low back pain     Time reflects when diagnosis was documented in both MDM as applicable and the Disposition within this note     Time User Action Codes Description Comment    9/6/2019  2:01 PM Larisa Coronado Add [M54 5,  G89 29] Acute exacerbation of chronic low back pain       ED Disposition     ED Disposition Condition Date/Time Comment    Discharge Stable Fri Sep 6, 2019  2:01 PM Speedy Rubio discharge to home/self care              Follow-up Information     Follow up With Specialties Details Why [address] MD Family Medicine Schedule an appointment as soon as possible for a visit   21 [phone]  [address] [facility] Physical Therapy Schedule an appointment as soon as possible for a visit   [phone]          Discharge Medication List as of 9/6/2019  2:02 PM      START taking these medications    Details   methocarbamol (ROBAXIN) 500 mg tablet Take 1 tablet (500 mg total) by mouth 3 (three) times a day as needed for muscle spasms, Starting Fri 9/6/2019, Normal      methylPREDNISolone 4 MG tablet therapy pack Use as directed on package, Normal      naproxen (NAPROSYN) 500 mg tablet Take 1 tablet (500 mg total) by mouth 2 (two) times a day with meals for 7 days, Starting Fri 9/6/2019, Until Fri 9/13/2019, Normal         CONTINUE these medications which have NOT CHANGED    Details   buPROPion (ZYBAN) 150 MG 12 hr tablet Take 1 tablet (150 mg total) by mouth 2 (two) times a day for 90 days, Starting Tue 3/12/2019, Until Mon 6/10/2019, Normal      nystatin (MYCOSTATIN) cream Apply topically 2 (two) times a day, Starting Fri 6/7/2019, Normal      omeprazole (PriLOSEC) 20 mg delayed release capsule Take 1 capsule by mouth daily, Starting Thu 10/24/2013, Historical Med           No discharge procedures on file      ED Provider  Electronically Signed by           Almaz Moreira DO  09/06/19 3660

## 2019-12-18 DIAGNOSIS — J06.9 UPPER RESPIRATORY TRACT INFECTION, UNSPECIFIED TYPE: Primary | ICD-10-CM

## 2019-12-18 RX ORDER — AZITHROMYCIN 250 MG/1
TABLET, FILM COATED ORAL
Qty: 6 TABLET | Refills: 0 | Status: SHIPPED | OUTPATIENT
Start: 2019-12-18 | End: 2019-12-23

## 2021-02-24 ENCOUNTER — HOSPITAL ENCOUNTER (EMERGENCY)
Facility: HOSPITAL | Age: 46
Discharge: HOME | End: 2021-02-24
Attending: EMERGENCY MEDICINE
Payer: COMMERCIAL

## 2021-02-24 ENCOUNTER — APPOINTMENT (EMERGENCY)
Dept: RADIOLOGY | Facility: HOSPITAL | Age: 46
End: 2021-02-24
Payer: COMMERCIAL

## 2021-02-24 VITALS
WEIGHT: 282.19 LBS | SYSTOLIC BLOOD PRESSURE: 134 MMHG | DIASTOLIC BLOOD PRESSURE: 89 MMHG | BODY MASS INDEX: 39.36 KG/M2 | TEMPERATURE: 98.2 F | HEART RATE: 74 BPM | OXYGEN SATURATION: 97 % | RESPIRATION RATE: 16 BRPM

## 2021-02-24 DIAGNOSIS — M54.9 BACK PAIN: Primary | ICD-10-CM

## 2021-02-24 DIAGNOSIS — J06.9 UPPER RESPIRATORY INFECTION: ICD-10-CM

## 2021-02-24 DIAGNOSIS — R07.9 CHEST PAIN: ICD-10-CM

## 2021-02-24 LAB
ALBUMIN SERPL BCP-MCNC: 3.5 G/DL (ref 3.5–5)
ALP SERPL-CCNC: 79 U/L (ref 46–116)
ALT SERPL W P-5'-P-CCNC: 40 U/L (ref 12–78)
ANION GAP SERPL CALCULATED.3IONS-SCNC: 9 MMOL/L (ref 4–13)
AST SERPL W P-5'-P-CCNC: 17 U/L (ref 5–45)
BASOPHILS # BLD AUTO: 0.05 THOUSANDS/ΜL (ref 0–0.1)
BASOPHILS NFR BLD AUTO: 1 % (ref 0–1)
BILIRUB SERPL-MCNC: 0.6 MG/DL (ref 0.2–1)
BUN SERPL-MCNC: 14 MG/DL (ref 5–25)
CALCIUM SERPL-MCNC: 9.8 MG/DL (ref 8.3–10.1)
CHLORIDE SERPL-SCNC: 106 MMOL/L (ref 100–108)
CO2 SERPL-SCNC: 26 MMOL/L (ref 21–32)
CREAT SERPL-MCNC: 0.93 MG/DL (ref 0.6–1.3)
EOSINOPHIL # BLD AUTO: 0.31 THOUSAND/ΜL (ref 0–0.61)
EOSINOPHIL NFR BLD AUTO: 4 % (ref 0–6)
ERYTHROCYTE [DISTWIDTH] IN BLOOD BY AUTOMATED COUNT: 12.3 % (ref 11.6–15.1)
FLUAV RNA RESP QL NAA+PROBE: NEGATIVE
FLUBV RNA RESP QL NAA+PROBE: NEGATIVE
GFR SERPL CREATININE-BSD FRML MDRD: 97 ML/MIN/1.73SQ M
GLUCOSE SERPL-MCNC: 143 MG/DL (ref 65–140)
HCT VFR BLD AUTO: 50.2 % (ref 36.5–49.3)
HGB BLD-MCNC: 16.7 G/DL (ref 12–17)
IMM GRANULOCYTES # BLD AUTO: 0.02 THOUSAND/UL (ref 0–0.2)
IMM GRANULOCYTES NFR BLD AUTO: 0 % (ref 0–2)
LYMPHOCYTES # BLD AUTO: 1.88 THOUSANDS/ΜL (ref 0.6–4.47)
LYMPHOCYTES NFR BLD AUTO: 25 % (ref 14–44)
MCH RBC QN AUTO: 29.2 PG (ref 26.8–34.3)
MCHC RBC AUTO-ENTMCNC: 33.3 G/DL (ref 31.4–37.4)
MCV RBC AUTO: 88 FL (ref 82–98)
MONOCYTES # BLD AUTO: 0.37 THOUSAND/ΜL (ref 0.17–1.22)
MONOCYTES NFR BLD AUTO: 5 % (ref 4–12)
NEUTROPHILS # BLD AUTO: 4.77 THOUSANDS/ΜL (ref 1.85–7.62)
NEUTS SEG NFR BLD AUTO: 65 % (ref 43–75)
NRBC BLD AUTO-RTO: 0 /100 WBCS
PLATELET # BLD AUTO: 251 THOUSANDS/UL (ref 149–390)
PMV BLD AUTO: 10 FL (ref 8.9–12.7)
POTASSIUM SERPL-SCNC: 3.8 MMOL/L (ref 3.5–5.3)
PROT SERPL-MCNC: 6.7 G/DL (ref 6.4–8.2)
RBC # BLD AUTO: 5.71 MILLION/UL (ref 3.88–5.62)
RSV RNA RESP QL NAA+PROBE: NEGATIVE
SARS-COV-2 RNA RESP QL NAA+PROBE: NEGATIVE
SODIUM SERPL-SCNC: 141 MMOL/L (ref 136–145)
TROPONIN I SERPL-MCNC: <0.02 NG/ML
TROPONIN I SERPL-MCNC: <0.02 NG/ML
WBC # BLD AUTO: 7.4 THOUSAND/UL (ref 4.31–10.16)

## 2021-02-24 PROCEDURE — 71045 X-RAY EXAM CHEST 1 VIEW: CPT

## 2021-02-24 PROCEDURE — 0241U HB NFCT DS VIR RESP RNA 4 TRGT: CPT | Performed by: EMERGENCY MEDICINE

## 2021-02-24 PROCEDURE — 84484 ASSAY OF TROPONIN QUANT: CPT | Performed by: EMERGENCY MEDICINE

## 2021-02-24 PROCEDURE — 80053 COMPREHEN METABOLIC PANEL: CPT | Performed by: EMERGENCY MEDICINE

## 2021-02-24 PROCEDURE — 85025 COMPLETE CBC W/AUTO DIFF WBC: CPT | Performed by: EMERGENCY MEDICINE

## 2021-02-24 PROCEDURE — 99285 EMERGENCY DEPT VISIT HI MDM: CPT

## 2021-02-24 PROCEDURE — 36415 COLL VENOUS BLD VENIPUNCTURE: CPT

## 2021-02-24 PROCEDURE — 99285 EMERGENCY DEPT VISIT HI MDM: CPT | Performed by: EMERGENCY MEDICINE

## 2021-02-24 PROCEDURE — 93005 ELECTROCARDIOGRAM TRACING: CPT

## 2021-02-24 PROCEDURE — 96372 THER/PROPH/DIAG INJ SC/IM: CPT

## 2021-02-24 RX ORDER — LIDOCAINE 50 MG/G
1 PATCH TOPICAL ONCE
Status: DISCONTINUED | OUTPATIENT
Start: 2021-02-24 | End: 2021-02-24 | Stop reason: HOSPADM

## 2021-02-24 RX ORDER — KETOROLAC TROMETHAMINE 30 MG/ML
30 INJECTION, SOLUTION INTRAMUSCULAR; INTRAVENOUS ONCE
Status: COMPLETED | OUTPATIENT
Start: 2021-02-24 | End: 2021-02-24

## 2021-02-24 RX ORDER — NAPROXEN 500 MG/1
500 TABLET ORAL 2 TIMES DAILY WITH MEALS
Qty: 20 TABLET | Refills: 0 | Status: SHIPPED | OUTPATIENT
Start: 2021-02-24

## 2021-02-24 RX ORDER — AZITHROMYCIN 250 MG/1
TABLET, FILM COATED ORAL
Qty: 6 TABLET | Refills: 0 | Status: SHIPPED | OUTPATIENT
Start: 2021-02-24 | End: 2021-02-28

## 2021-02-24 RX ORDER — AZITHROMYCIN 500 MG/1
500 TABLET, FILM COATED ORAL ONCE
Status: COMPLETED | OUTPATIENT
Start: 2021-02-24 | End: 2021-02-24

## 2021-02-24 RX ORDER — AZITHROMYCIN 250 MG/1
TABLET, FILM COATED ORAL
Qty: 6 TABLET | Refills: 0 | Status: SHIPPED | OUTPATIENT
Start: 2021-02-24 | End: 2021-02-24 | Stop reason: SDUPTHER

## 2021-02-24 RX ORDER — DIAZEPAM 5 MG/1
5 TABLET ORAL 2 TIMES DAILY
Qty: 15 TABLET | Refills: 0 | Status: SHIPPED | OUTPATIENT
Start: 2021-02-24 | End: 2021-03-06

## 2021-02-24 RX ADMIN — AZITHROMYCIN 500 MG: 500 TABLET, FILM COATED ORAL at 12:53

## 2021-02-24 RX ADMIN — KETOROLAC TROMETHAMINE 30 MG: 30 INJECTION, SOLUTION INTRAMUSCULAR at 12:53

## 2021-02-24 RX ADMIN — LIDOCAINE 1 PATCH: 50 PATCH CUTANEOUS at 12:54

## 2021-02-24 NOTE — ED PROVIDER NOTES
History  Chief Complaint   Patient presents with    Back Pain     Pt with right sided back pain that radiates around to the right side of chest and into right arm     Chest Pain     48 y o  M presents w right sided back pain that radiates around to R thorax - has been ongoing for a few days  This is associated w a chest pain  Also radiates to R arm  No pain w deep inhalation - no hx of DVT/PE  Patient denies known COVID exposure  Denies F/C, admits to coughing  PMHx - no cardiac hx  Hx of anxiety and GERD  Back Pain  Associated symptoms: chest pain    Associated symptoms: no abdominal pain, no dysuria, no fever and no headaches    Chest Pain  Associated symptoms: back pain and cough    Associated symptoms: no abdominal pain, no dizziness, no fatigue, no fever, no headache, no nausea, no palpitations, no shortness of breath and not vomiting        Prior to Admission Medications   Prescriptions Last Dose Informant Patient Reported? Taking?    buPROPion (ZYBAN) 150 MG 12 hr tablet   No No   Sig: Take 1 tablet (150 mg total) by mouth 2 (two) times a day for 90 days   methocarbamol (ROBAXIN) 500 mg tablet   No No   Sig: Take 1 tablet (500 mg total) by mouth 3 (three) times a day as needed for muscle spasms   methylPREDNISolone 4 MG tablet therapy pack   No No   Sig: Use as directed on package   naproxen (NAPROSYN) 500 mg tablet   No No   Sig: Take 1 tablet (500 mg total) by mouth 2 (two) times a day with meals for 7 days   nystatin (MYCOSTATIN) cream   No No   Sig: Apply topically 2 (two) times a day   omeprazole (PriLOSEC) 20 mg delayed release capsule   Yes No   Sig: Take 1 capsule by mouth daily      Facility-Administered Medications: None       Past Medical History:   Diagnosis Date    Anxiety and depression     Depression     Esophageal reflux     Fracture of both hands     Hair loss     Heartburn     Hiatal hernia     Resolved 7/24/2015     Night sweats     Sleep disturbance     Starting and stopping of urinary stream during micturition     Stomach problems     Weight gain        Past Surgical History:   Procedure Laterality Date    APPENDECTOMY  1984    COLONOSCOPY      ESOPHAGOGASTRODUODENOSCOPY      Diagnostic     HAND SURGERY      Right in 1998 and left in 1991     NOSE SURGERY      Nasal septal deviation repair        Family History   Problem Relation Age of Onset    No Known Problems Mother     Pancreatic cancer Maternal Grandmother     Colon cancer Maternal Grandfather     Dementia Paternal Grandmother      I have reviewed and agree with the history as documented  E-Cigarette/Vaping     E-Cigarette/Vaping Substances     Social History     Tobacco Use    Smoking status: Former Smoker     Packs/day: 1 00     Years: 25 00     Pack years: 25 00     Types: Cigarettes    Smokeless tobacco: Never Used   Substance Use Topics    Alcohol use: Yes    Drug use: Not Currently     Comment: Quit - As per [employer]        Review of Systems   Constitutional: Negative for chills, fatigue and fever  HENT: Negative for congestion and sore throat  Respiratory: Positive for cough  Negative for apnea, chest tightness, shortness of breath and wheezing  Cardiovascular: Positive for chest pain  Negative for palpitations and leg swelling  Gastrointestinal: Negative for abdominal pain, constipation, diarrhea, nausea and vomiting  Genitourinary: Negative for dysuria and flank pain  Musculoskeletal: Positive for back pain  Negative for neck pain  Skin: Negative for color change and rash  Allergic/Immunologic: Negative for immunocompromised state  Neurological: Negative for dizziness, syncope and headaches  Physical Exam  Physical Exam  Vitals signs reviewed  Constitutional:       General: He is not in acute distress  Appearance: He is well-developed  He is not ill-appearing, toxic-appearing or diaphoretic  HENT:      Head: Normocephalic and atraumatic     Eyes:      General: No scleral icterus  Right eye: No discharge  Left eye: No discharge  Conjunctiva/sclera: Conjunctivae normal       Pupils: Pupils are equal, round, and reactive to light  Neck:      Musculoskeletal: Normal range of motion and neck supple  Vascular: No JVD  Cardiovascular:      Rate and Rhythm: Normal rate and regular rhythm  Heart sounds: Normal heart sounds  No murmur  No friction rub  No gallop  Pulmonary:      Effort: Pulmonary effort is normal  No respiratory distress  Breath sounds: Examination of the right-lower field reveals rhonchi  Rhonchi present  No wheezing or rales  Chest:      Chest wall: Tenderness present  Abdominal:      General: Bowel sounds are normal  There is no distension  Palpations: Abdomen is soft  Tenderness: There is no abdominal tenderness  There is no guarding or rebound  Musculoskeletal: Normal range of motion  General: No tenderness or deformity  Right lower leg: He exhibits no tenderness  No edema  Left lower leg: He exhibits no tenderness  No edema  Skin:     General: Skin is warm and dry  Coloration: Skin is not pale  Findings: No erythema or rash  Neurological:      Mental Status: He is alert and oriented to person, place, and time  Cranial Nerves: No cranial nerve deficit     Psychiatric:         Behavior: Behavior normal          Vital Signs  ED Triage Vitals   Temperature Pulse Respirations Blood Pressure SpO2   02/24/21 0907 02/24/21 0907 02/24/21 0907 02/24/21 0907 02/24/21 0907   98 2 °F (36 8 °C) 90 16 145/82 98 %      Temp Source Heart Rate Source Patient Position - Orthostatic VS BP Location FiO2 (%)   02/24/21 0907 02/24/21 1142 02/24/21 1142 02/24/21 1142 --   Oral Monitor Lying Right arm       Pain Score       --                  Vitals:    02/24/21 0907 02/24/21 1142   BP: 145/82 134/89   Pulse: 90 74   Patient Position - Orthostatic VS:  Lying         Visual Acuity      ED Medications  Medications   ketorolac (TORADOL) injection 30 mg (30 mg Intramuscular Given 2/24/21 1253)   azithromycin (ZITHROMAX) tablet 500 mg (500 mg Oral Given 2/24/21 1253)       Diagnostic Studies  Results Reviewed     Procedure Component Value Units Date/Time    Troponin I [[id number]]  (Normal) Collected: 02/24/21 1318    Lab Status: Final result Specimen: Blood from Arm, Left Updated: 02/24/21 1353     Troponin I <0 02 ng/mL     COVID19, Influenza A/B, RSV PCR, [facility] [[id number]]  (Normal) Collected: 02/24/21 1121    Lab Status: Final result Specimen: Nares from Nasopharyngeal Swab Updated: 02/24/21 1217     SARS-CoV-2 Negative     INFLUENZA A PCR Negative     INFLUENZA B PCR Negative     RSV PCR Negative    Narrative: This test has been authorized by FDA under an EUA (Emergency Use Assay) for use by authorized laboratories  Clinical caution and judgement should be used with the interpretation of these results with consideration of the clinical impression and other laboratory testing  Testing reported as "Positive" or "Negative" has been proven to be accurate according to standard laboratory validation requirements  All testing is performed with control materials showing appropriate reactivity at standard intervals      Comprehensive metabolic panel [[id number]]  (Abnormal) Collected: 02/24/21 0922    Lab Status: Final result Specimen: Blood Updated: 02/24/21 1008     Sodium 141 mmol/L      Potassium 3 8 mmol/L      Chloride 106 mmol/L      CO2 26 mmol/L      ANION GAP 9 mmol/L      BUN 14 mg/dL      Creatinine 0 93 mg/dL      Glucose 143 mg/dL      Calcium 9 8 mg/dL      AST 17 U/L      ALT 40 U/L      Alkaline Phosphatase 79 U/L      Total Protein 6 7 g/dL      Albumin 3 5 g/dL      Total Bilirubin 0 60 mg/dL      eGFR 97 ml/min/1 73sq m     Narrative:      [place] guidelines for Chronic Kidney Disease (CKD):     Stage 1 with normal or high GFR (GFR > 90 mL/min/1 73 square meters)    Stage 2 Mild CKD (GFR = 60-89 mL/min/1 73 square meters)    Stage 3A Moderate CKD (GFR = 45-59 mL/min/1 73 square meters)    Stage 3B Moderate CKD (GFR = 30-44 mL/min/1 73 square meters)    Stage 4 Severe CKD (GFR = 15-29 mL/min/1 73 square meters)    Stage 5 End Stage CKD (GFR <15 mL/min/1 73 square meters)  Note: GFR calculation is accurate only with a steady state creatinine    Troponin I [[id number]]  (Normal) Collected: 02/24/21 0922    Lab Status: Final result Specimen: Blood Updated: 02/24/21 0946     Troponin I <0 02 ng/mL     CBC and differential [[id number]]  (Abnormal) Collected: 02/24/21 0925    Lab Status: Final result Specimen: Blood Updated: 02/24/21 0925     WBC 7 40 Thousand/uL      RBC 5 71 Million/uL      Hemoglobin 16 7 g/dL      Hematocrit 50 2 %      MCV 88 fL      MCH 29 2 pg      MCHC 33 3 g/dL      RDW 12 3 %      MPV 10 0 fL      Platelets 005 Thousands/uL      nRBC 0 /100 WBCs      Neutrophils Relative 65 %      Immat GRANS % 0 %      Lymphocytes Relative 25 %      Monocytes Relative 5 %      Eosinophils Relative 4 %      Basophils Relative 1 %      Neutrophils Absolute 4 77 Thousands/µL      Immature Grans Absolute 0 02 Thousand/uL      Lymphocytes Absolute 1 88 Thousands/µL      Monocytes Absolute 0 37 Thousand/µL      Eosinophils Absolute 0 31 Thousand/µL      Basophils Absolute 0 05 Thousands/µL                  XR chest 1 view   Final Result by Jazmín Aguirre MD (02/24 1150)      No acute cardiopulmonary disease  Workstation performed: [id number]         XR chest 1 view portable   ED Interpretation by Mariah Rashid DO (02/24 1232)   Concern for infiltrate right middle lobe      Final Result by Rosio Mejia MD (02/24 1410)      No acute cardiopulmonary disease  Workstation performed: [id number]                    Procedures  Procedures     EKG reviewed, no STEMI, no concerning arrhythmia      ED Course  ED Course as of Mar 10 0859 Wed Feb 24, 2021   1117 Will get cxr lateral  Cocnern for PNA  Concern for COVID  Covid swab preformed  HEART Risk Score      Most Recent Value   Heart Score Risk Calculator   History  1 Filed at: 02/24/2021 1247   ECG  0 Filed at: 02/24/2021 1247   Age  1 Filed at: 02/24/2021 1247   Risk Factors  0 Filed at: 02/24/2021 1247   Troponin  0 Filed at: 02/24/2021 1247   HEART Score  2 Filed at: 02/24/2021 1247              PERC Rule for PE      Most Recent Value   PERC Rule for PE   Age >=50  0 Filed at: 02/24/2021 1304   HR >=100  0 Filed at: 02/24/2021 1304   O2 Sat on room air < 95%  0 Filed at: 02/24/2021 1304   History of PE or DVT  0 Filed at: 02/24/2021 1304   Recent trauma or surgery  0 Filed at: 02/24/2021 1304   Hemoptysis  0 Filed at: 02/24/2021 1304   Exogenous estrogen  0 Filed at: 02/24/2021 1304   Unilateral leg swelling  0 Filed at: 02/24/2021 1304   PERC Rule for PE Results  0 Filed at: 02/24/2021 1304                            MDM  Number of Diagnoses or Management Options  Back pain:   Chest pain:   Upper respiratory infection:   Diagnosis management comments: Otherwise healthy 48year-old with right-sided chest pain and back pain with radiation into right arm  Cardiac workup is negative and considering the length of time since symptom onset, single cardiac evaluation is sufficient rule out cardiac disease  Chest x-ray is concerning for right middle lobe pneumonia  Patient is discharged on azithromycin and advised follow-up with primary care provider  In addition he is advised to return precaution case of return of chest pain, shortness of breath, signs of systemic illness, signs of systemic infection, or non improvement  Patient discharged in stable condition         Amount and/or Complexity of Data Reviewed  Clinical lab tests: ordered and reviewed  Tests in the radiology section of CPT®: ordered and reviewed        Disposition  Final diagnoses:   Back pain   Chest pain Upper respiratory infection     Time reflects when diagnosis was documented in both MDM as applicable and the Disposition within this note     Time User Action Codes Description Comment    2/24/2021 12:54 PM CoppersmFran griffin L Add [M54 9] Back pain     2/24/2021 12:54 PM Coppersmith, Jacquetta Folk Add [J18 9] Pneumonia     2/24/2021 12:54 PM CoppersmithFran L Add [R07 9] Chest pain     2/24/2021  1:00 PM CoppersmithGlendy Harm [J18 9] Pneumonia     2/24/2021  1:01 PM Coppersmith, [place] L Add [J39 9] Upper respiratory disease     2/24/2021  1:01 PM Coppersmith, Jacquetta Folk Remove [J39 9] Upper respiratory disease     2/24/2021  1:01 PM Coppersmith, Jacquetta Folk Add [J06 9] Upper respiratory infection       ED Disposition     ED Disposition Condition Date/Time Comment    Discharge Stable Wed Feb 24, 2021 12:54 PM Dory Smith discharge to home/self care              Follow-up Information     Follow up With Specialties Details Why Contact Info Additional Information    Brooklyn Steele MD Family Medicine Schedule an appointment as soon as possible for a visit  For follow up to ensure improvement, and for further testing and treatment as needed [address]  [address] [facility] Physical Therapy Schedule an appointment as soon as possible for a visit  For follow up to ensure improvement, and for further testing and treatment as needed           Discharge Medication List as of 2/24/2021  2:09 PM      START taking these medications    Details   diazepam (VALIUM) 5 mg tablet Take 1 tablet (5 mg total) by mouth 2 (two) times a day for 10 days As needed for muscle relaxation to help w back pain, Starting Wed 2/24/2021, Until Sat 3/6/2021, Normal      Diclofenac Sodium (VOLTAREN) 1 % Apply 2 g topically 4 (four) times a day, Starting Wed 2/24/2021, Normal      azithromycin (ZITHROMAX) 250 mg tablet Take 2 tablets today then 1 tablet daily x 4 days, Normal         CONTINUE these medications which have CHANGED    Details   naproxen (NAPROSYN) 500 mg tablet Take 1 tablet (500 mg total) by mouth 2 (two) times a day with meals As needed for pain control, Starting Wed 2/24/2021, Normal         CONTINUE these medications which have NOT CHANGED    Details   buPROPion (ZYBAN) 150 MG 12 hr tablet Take 1 tablet (150 mg total) by mouth 2 (two) times a day for 90 days, Starting Tue 3/12/2019, Until Mon 6/10/2019, Normal      methocarbamol (ROBAXIN) 500 mg tablet Take 1 tablet (500 mg total) by mouth 3 (three) times a day as needed for muscle spasms, Starting Fri 9/6/2019, Normal      methylPREDNISolone 4 MG tablet therapy pack Use as directed on package, Normal      nystatin (MYCOSTATIN) cream Apply topically 2 (two) times a day, Starting Fri 6/7/2019, Normal      omeprazole (PriLOSEC) 20 mg delayed release capsule Take 1 capsule by mouth daily, Starting u 10/24/2013, Historical Med           No discharge procedures on file      PDMP Review     None          ED Provider  Electronically Signed by           Ortiz Espinoza DO  03/10/21 2041

## 2021-02-26 LAB
ATRIAL RATE: 76 BPM
ATRIAL RATE: 78 BPM
ATRIAL RATE: 89 BPM
P AXIS: 50 DEGREES
P AXIS: 51 DEGREES
P AXIS: 62 DEGREES
PR INTERVAL: 146 MS
PR INTERVAL: 148 MS
PR INTERVAL: 154 MS
QRS AXIS: 43 DEGREES
QRS AXIS: 5 DEGREES
QRS AXIS: 8 DEGREES
QRSD INTERVAL: 100 MS
QRSD INTERVAL: 92 MS
QRSD INTERVAL: 94 MS
QT INTERVAL: 348 MS
QT INTERVAL: 352 MS
QT INTERVAL: 354 MS
QTC INTERVAL: 398 MS
QTC INTERVAL: 401 MS
QTC INTERVAL: 423 MS
T WAVE AXIS: 26 DEGREES
T WAVE AXIS: 27 DEGREES
T WAVE AXIS: 60 DEGREES
VENTRICULAR RATE: 76 BPM
VENTRICULAR RATE: 78 BPM
VENTRICULAR RATE: 89 BPM

## 2021-02-26 PROCEDURE — 93010 ELECTROCARDIOGRAM REPORT: CPT | Performed by: INTERNAL MEDICINE

## 2021-03-01 DIAGNOSIS — G95.0 SYRINX OF SPINAL CORD (HCC): Primary | ICD-10-CM

## 2021-03-05 ENCOUNTER — TELEPHONE (OUTPATIENT)
Dept: NEUROLOGY | Facility: CLINIC | Age: 46
End: 2021-03-05

## 2021-03-05 NOTE — TELEPHONE ENCOUNTER
Best contact number for patient: [phone]     Emergency Contact name and number: Antwan Kahn [phone]     Referring provider and telephone number: Toya Cervantes     Primary Care Provider Name and if affiliated with [facility]:  Toya Cervantes     Reason for Appointment/Dx: radiculopathy     Have you seen and followed up with a pediatric Neurologist for this disease in the past?  No (If yes ok to schedule with Dr Winston Nichole)    Neurology Location patient would like to be seen:    Order received? Yes                                                 Records Received? Yes    Have you ever seen another Neurologist?       Yes, Yes, Dr Rolando Lindsay Name:    ID/Policy #:    Secondary Insurance:    ID/Policy#: Workman's Comp/ Accident/ School  Information      Workman's Comp/Accident/School related?        No    If yes name of Insurance company:    Date of Injury:    Type of Injury:    [address] Name and Telephone Number:    Notes:                   Appointment date: 07/2021

## 2021-07-04 ENCOUNTER — TELEPHONE (OUTPATIENT)
Dept: OTHER | Facility: OTHER | Age: 46
End: 2021-07-04

## 2021-08-19 ENCOUNTER — APPOINTMENT (OUTPATIENT)
Dept: LAB | Facility: AMBULARY SURGERY CENTER | Age: 46
End: 2021-08-19
Payer: COMMERCIAL

## 2021-08-19 DIAGNOSIS — Z00.8 ENCOUNTER FOR OTHER GENERAL EXAMINATION: ICD-10-CM

## 2021-08-19 LAB
CHOLEST SERPL-MCNC: 211 MG/DL (ref 50–200)
EST. AVERAGE GLUCOSE BLD GHB EST-MCNC: 97 MG/DL
HBA1C MFR BLD: 5 %
HDLC SERPL-MCNC: 48 MG/DL
LDLC SERPL CALC-MCNC: 137 MG/DL (ref 0–100)
NONHDLC SERPL-MCNC: 163 MG/DL
TRIGL SERPL-MCNC: 130 MG/DL

## 2021-08-19 PROCEDURE — 80061 LIPID PANEL: CPT

## 2021-08-19 PROCEDURE — 36415 COLL VENOUS BLD VENIPUNCTURE: CPT

## 2021-08-19 PROCEDURE — 83036 HEMOGLOBIN GLYCOSYLATED A1C: CPT

## 2021-09-14 ENCOUNTER — IMMUNIZATIONS (OUTPATIENT)
Dept: FAMILY MEDICINE CLINIC | Facility: HOSPITAL | Age: 46
End: 2021-09-14

## 2021-09-14 PROCEDURE — 91303: CPT

## 2021-09-14 PROCEDURE — 0031A: CPT

## 2021-11-23 DIAGNOSIS — Z11.59 SCREENING FOR VIRAL DISEASE: Primary | ICD-10-CM

## 2021-11-23 PROCEDURE — U0005 INFEC AGEN DETEC AMPLI PROBE: HCPCS | Performed by: FAMILY MEDICINE

## 2021-11-23 PROCEDURE — U0003 INFECTIOUS AGENT DETECTION BY NUCLEIC ACID (DNA OR RNA); SEVERE ACUTE RESPIRATORY SYNDROME CORONAVIRUS 2 (SARS-COV-2) (CORONAVIRUS DISEASE [COVID-19]), AMPLIFIED PROBE TECHNIQUE, MAKING USE OF HIGH THROUGHPUT TECHNOLOGIES AS DESCRIBED BY CMS-2020-01-R: HCPCS | Performed by: FAMILY MEDICINE

## 2021-11-24 ENCOUNTER — TELEPHONE (OUTPATIENT)
Dept: FAMILY MEDICINE CLINIC | Facility: CLINIC | Age: 46
End: 2021-11-24

## 2021-11-24 LAB — SARS-COV-2 RNA RESP QL NAA+PROBE: NEGATIVE

## 2021-12-27 ENCOUNTER — TELEMEDICINE (OUTPATIENT)
Dept: FAMILY MEDICINE CLINIC | Facility: CLINIC | Age: 46
End: 2021-12-27
Payer: COMMERCIAL

## 2021-12-27 DIAGNOSIS — Z11.59 SCREENING FOR VIRAL DISEASE: Primary | ICD-10-CM

## 2021-12-27 DIAGNOSIS — U07.1 COVID-19 VIRUS INFECTION: Primary | ICD-10-CM

## 2021-12-27 PROCEDURE — U0005 INFEC AGEN DETEC AMPLI PROBE: HCPCS | Performed by: FAMILY MEDICINE

## 2021-12-27 PROCEDURE — 99441 PR PHYS/QHP TELEPHONE EVALUATION 5-10 MIN: CPT | Performed by: FAMILY MEDICINE

## 2021-12-27 PROCEDURE — U0003 INFECTIOUS AGENT DETECTION BY NUCLEIC ACID (DNA OR RNA); SEVERE ACUTE RESPIRATORY SYNDROME CORONAVIRUS 2 (SARS-COV-2) (CORONAVIRUS DISEASE [COVID-19]), AMPLIFIED PROBE TECHNIQUE, MAKING USE OF HIGH THROUGHPUT TECHNOLOGIES AS DESCRIBED BY CMS-2020-01-R: HCPCS | Performed by: FAMILY MEDICINE

## 2022-04-07 ENCOUNTER — APPOINTMENT (OUTPATIENT)
Dept: LAB | Facility: CLINIC | Age: 47
End: 2022-04-07

## 2022-04-07 DIAGNOSIS — Z00.8 HEALTH EXAMINATION IN POPULATION SURVEY: ICD-10-CM

## 2022-04-07 LAB
CHOLEST SERPL-MCNC: 189 MG/DL
EST. AVERAGE GLUCOSE BLD GHB EST-MCNC: 100 MG/DL
HBA1C MFR BLD: 5.1 %
HDLC SERPL-MCNC: 45 MG/DL
LDLC SERPL CALC-MCNC: 91 MG/DL (ref 0–100)
NONHDLC SERPL-MCNC: 144 MG/DL
TRIGL SERPL-MCNC: 267 MG/DL

## 2022-04-07 PROCEDURE — 80061 LIPID PANEL: CPT

## 2022-04-07 PROCEDURE — 36415 COLL VENOUS BLD VENIPUNCTURE: CPT

## 2022-04-07 PROCEDURE — 83036 HEMOGLOBIN GLYCOSYLATED A1C: CPT

## 2022-12-03 ENCOUNTER — HOSPITAL ENCOUNTER (EMERGENCY)
Facility: HOSPITAL | Age: 47
Discharge: HOME | End: 2022-12-03
Attending: EMERGENCY MEDICINE

## 2022-12-03 VITALS
SYSTOLIC BLOOD PRESSURE: 136 MMHG | OXYGEN SATURATION: 96 % | TEMPERATURE: 98.9 F | DIASTOLIC BLOOD PRESSURE: 74 MMHG | RESPIRATION RATE: 16 BRPM | WEIGHT: 280 LBS | BODY MASS INDEX: 39.05 KG/M2 | HEART RATE: 91 BPM

## 2022-12-03 DIAGNOSIS — M54.9 BACK PAIN: Primary | ICD-10-CM

## 2022-12-03 LAB
BILIRUB UR QL STRIP: NEGATIVE
CLARITY UR: CLEAR
COLOR UR: NORMAL
GLUCOSE UR STRIP-MCNC: NEGATIVE MG/DL
HGB UR QL STRIP.AUTO: NEGATIVE
KETONES UR STRIP-MCNC: NEGATIVE MG/DL
LEUKOCYTE ESTERASE UR QL STRIP: NEGATIVE
NITRITE UR QL STRIP: NEGATIVE
PH UR STRIP.AUTO: 6.5 [PH]
PROT UR STRIP-MCNC: NEGATIVE MG/DL
SP GR UR STRIP.AUTO: 1.02 (ref 1–1.03)
UROBILINOGEN UR STRIP-ACNC: <2 MG/DL

## 2022-12-03 RX ORDER — METHOCARBAMOL 500 MG/1
500 TABLET, FILM COATED ORAL 2 TIMES DAILY
Qty: 20 TABLET | Refills: 0 | Status: SHIPPED | OUTPATIENT
Start: 2022-12-03

## 2022-12-03 RX ORDER — KETOROLAC TROMETHAMINE 30 MG/ML
30 INJECTION, SOLUTION INTRAMUSCULAR; INTRAVENOUS ONCE
Status: COMPLETED | OUTPATIENT
Start: 2022-12-03 | End: 2022-12-03

## 2022-12-03 RX ORDER — NAPROXEN 500 MG/1
500 TABLET ORAL 2 TIMES DAILY WITH MEALS
Qty: 30 TABLET | Refills: 0 | Status: SHIPPED | OUTPATIENT
Start: 2022-12-03

## 2022-12-03 RX ADMIN — KETOROLAC TROMETHAMINE 30 MG: 30 INJECTION, SOLUTION INTRAMUSCULAR; INTRAVENOUS at 08:53

## 2022-12-03 NOTE — Clinical Note
Marlin Lazaro was seen and treated in our emergency department on 12/3/2022  Diagnosis:     Senait Gillette  may return to work on return date  He may return on this date: 12/06/2022         If you have any questions or concerns, please don't hesitate to call        Xander Klein    ______________________________           _______________          _______________  Hospital Representative                              Date                                Time

## 2022-12-03 NOTE — Clinical Note
Meggan Luong was seen and treated in our emergency department on 12/3/2022  Diagnosis:     Quita Nageotte  may return to work on return date  He may return on this date: 12/06/2022         If you have any questions or concerns, please don't hesitate to call        German Akhtar    ______________________________           _______________          _______________  Hospital Representative                              Date                                Time

## 2022-12-03 NOTE — ED PROVIDER NOTES
History  Chief Complaint   Patient presents with   • Flank Pain     Pt c/o left sided flank pain x 1 weeks, pt states it worsens when sitting or laying down      51 y/o male presents to the ER for evaluation of left flank pain  Patient stated that the pain started on Tuesday, described as dull and aching  Patient states that he he has history of herniated discs in his lumbar spine  He reports that he normal has back pain however the pain does not last this long  He denies any specific injury or trauma  However he does have a very physical job  The pain initially was intermittent the first few days and now is constant and worse with range of motion  The pain occasionally radiates into his left buttocks  Patient states that he is not currently having any urinary symptoms, or see blood when he urinates  He has been taking tylenol/motrin and has had minimal relief  Denies tingling, numbness, or weakness N/V/D, or fever  History provided by:  Patient  Flank Pain  Pain location:  L flank  Pain quality: aching and dull    Pain radiates to:  L flank  Pain severity:  Moderate  Onset quality:  Sudden  Duration:  1 week  Timing:  Constant  Progression:  Unchanged  Chronicity:  Recurrent  Context: not previous surgeries and not trauma    Relieved by:  Not moving  Worsened by: Movement  Ineffective treatments:  Acetaminophen  Associated symptoms: diarrhea (per patient he has chronic loose stools)    Associated symptoms: no chest pain, no chills, no dysuria, no fever, no hematuria, no nausea, no shortness of breath and no vomiting        Prior to Admission Medications   Prescriptions Last Dose Informant Patient Reported? Taking?    Diclofenac Sodium (VOLTAREN) 1 %   No No   Sig: Apply 2 g topically 4 (four) times a day   buPROPion (ZYBAN) 150 MG 12 hr tablet   No No   Sig: Take 1 tablet (150 mg total) by mouth 2 (two) times a day for 90 days   diazepam (VALIUM) 5 mg tablet   No No   Sig: Take 1 tablet (5 mg total) by mouth 2 (two) times a day for 10 days As needed for muscle relaxation to help w back pain   methocarbamol (ROBAXIN) 500 mg tablet   No No   Sig: Take 1 tablet (500 mg total) by mouth 3 (three) times a day as needed for muscle spasms   methylPREDNISolone 4 MG tablet therapy pack   No No   Sig: Use as directed on package   naproxen (NAPROSYN) 500 mg tablet   No No   Sig: Take 1 tablet (500 mg total) by mouth 2 (two) times a day with meals As needed for pain control   nystatin (MYCOSTATIN) cream   No No   Sig: Apply topically 2 (two) times a day   omeprazole (PriLOSEC) 20 mg delayed release capsule   Yes No   Sig: Take 1 capsule by mouth daily      Facility-Administered Medications: None       Past Medical History:   Diagnosis Date   • Anxiety and depression    • Depression    • Esophageal reflux    • Fracture of both hands    • Hair loss    • Heartburn    • Hiatal hernia     Resolved 7/24/2015    • Night sweats    • Sleep disturbance    • Starting and stopping of urinary stream during micturition    • Stomach problems    • Weight gain        Past Surgical History:   Procedure Laterality Date   • APPENDECTOMY  1984   • COLONOSCOPY     • ESOPHAGOGASTRODUODENOSCOPY      Diagnostic    • HAND SURGERY      Right in 1998 and left in 1991    • NOSE SURGERY      Nasal septal deviation repair        Family History   Problem Relation Age of Onset   • No Known Problems Mother    • Pancreatic cancer Maternal Grandmother    • Colon cancer Maternal Grandfather    • Dementia Paternal Grandmother      I have reviewed and agree with the history as documented  E-Cigarette/Vaping     E-Cigarette/Vaping Substances     Social History     Tobacco Use   • Smoking status: Former     Packs/day: 1 00     Years: 25 00     Pack years: 25 00     Types: Cigarettes   • Smokeless tobacco: Never   Substance Use Topics   • Alcohol use:  Yes   • Drug use: Not Currently     Comment: Quit - As per [employer]        Review of Systems   Constitutional: Negative for chills and fever  Respiratory: Negative for shortness of breath  Cardiovascular: Negative for chest pain  Gastrointestinal: Positive for diarrhea (per patient he has chronic loose stools)  Negative for abdominal distention, abdominal pain, nausea and vomiting  Genitourinary: Positive for flank pain  Negative for dysuria, hematuria and urgency  Musculoskeletal: Positive for back pain and myalgias  Negative for arthralgias  Skin: Negative for rash  Neurological: Negative for weakness and numbness  All other systems reviewed and are negative  Physical Exam  Physical Exam  Vitals and nursing note reviewed  Constitutional:       General: He is not in acute distress  Appearance: He is well-developed  HENT:      Head: Normocephalic and atraumatic  Right Ear: External ear normal       Left Ear: External ear normal       Nose: Nose normal    Eyes:      Conjunctiva/sclera: Conjunctivae normal    Cardiovascular:      Rate and Rhythm: Normal rate and regular rhythm  Heart sounds: No murmur heard  Pulmonary:      Effort: Pulmonary effort is normal  No respiratory distress  Breath sounds: Normal breath sounds  Abdominal:      Palpations: Abdomen is soft  Tenderness: There is no abdominal tenderness  Musculoskeletal:         General: No swelling  Cervical back: Neck supple  Lumbar back: Tenderness present  No signs of trauma  Positive right straight leg raise test and positive left straight leg raise test       Comments: Lower back pain increased with range of motion  Skin:     General: Skin is warm and dry  Capillary Refill: Capillary refill takes less than 2 seconds  Neurological:      Mental Status: He is alert and oriented to person, place, and time     Psychiatric:         Mood and Affect: Mood normal          Vital Signs  ED Triage Vitals   Temperature Pulse Respirations Blood Pressure SpO2   12/03/22 0759 12/03/22 0759 12/03/22 0759 12/03/22 0759 12/03/22 0759   98 9 °F (37 2 °C) 91 16 136/74 96 %      Temp Source Heart Rate Source Patient Position - Orthostatic VS BP Location FiO2 (%)   12/03/22 0759 12/03/22 0759 12/03/22 0759 12/03/22 0759 --   Tympanic Monitor Sitting Left arm       Pain Score       12/03/22 0853       7           Vitals:    12/03/22 0759   BP: 136/74   Pulse: 91   Patient Position - Orthostatic VS: Sitting         Visual Acuity      ED Medications  Medications   ketorolac (TORADOL) injection 30 mg (30 mg Intramuscular Given 12/3/22 0853)       Diagnostic Studies  Results Reviewed     Procedure Component Value Units Date/Time    UA w Reflex to Microscopic w Reflex to Culture [[id number]] Collected: 12/03/22 0850    Lab Status: Final result Specimen: Urine, Clean Catch Updated: 12/03/22 0917     Color, UA Light Yellow     Clarity, UA Clear     Specific Gravity, UA 1 023     pH, UA 6 5     Leukocytes, UA Negative     Nitrite, UA Negative     Protein, UA Negative mg/dl      Glucose, UA Negative mg/dl      Ketones, UA Negative mg/dl      Urobilinogen, UA <2 0 mg/dl      Bilirubin, UA Negative     Occult Blood, UA Negative                 No orders to display              Procedures  Procedures         ED Course                                             MDM  Number of Diagnoses or Management Options  Back pain: new and requires workup  Diagnosis management comments: 49 y/o male presents to ER for flank pain  This patient presents musculoskeletal spasm of his lower back  Straight leg raise positive bilaterally  UA resulted as unremarkable; no leukocytes, nitrates, occult blood  Based on history and physical presentation not consistent with nephrolithiasis, pyelonephritis, urinary tract infection, cord compression, or cauda equina  Given clinical picture, no indication for imaging at this time  Patient given Toradol 30mg IM and had a successful pain relief  Prescribed naproxen 500mg BID prn and Robaxin 500mg BID prn   I had a detailed discussion regarding the historical points, exam findings, and diagnostic results supporting the discharge diagnosis  I also discussed the need for outpatient follow-up with comprehensive spine program and the need to return to the ED if symptoms worsen or if there are any questions or concerns that arise at home  Amount and/or Complexity of Data Reviewed  Clinical lab tests: ordered and reviewed        Disposition  Final diagnoses:   Back pain     Time reflects when diagnosis was documented in both MDM as applicable and the Disposition within this note     Time User Action Codes Description Comment    12/3/2022  9:23 AM Goldenjac Lu Add [M54 9] Back pain       ED Disposition     ED Disposition   Discharge    Condition   Stable    Date/Time   Sat Dec 3, 2022  9:23 AM    Comment   Sumanth Grief discharge to home/self care  Follow-up Information     Follow up With Specialties Details Why Contact Info Additional Information    Almaz Forbes MD Family Medicine   [address] [place] [address] [phone]       [address] [facility]   [phone]          Discharge Medication List as of 12/3/2022  9:25 AM      START taking these medications    Details   !! methocarbamol (ROBAXIN) 500 mg tablet Take 1 tablet (500 mg total) by mouth 2 (two) times a day, Starting Sat 12/3/2022, Normal      !! naproxen (Naprosyn) 500 mg tablet Take 1 tablet (500 mg total) by mouth 2 (two) times a day with meals, Starting Sat 12/3/2022, Normal       !! - Potential duplicate medications found  Please discuss with provider        CONTINUE these medications which have NOT CHANGED    Details   buPROPion (ZYBAN) 150 MG 12 hr tablet Take 1 tablet (150 mg total) by mouth 2 (two) times a day for 90 days, Starting Tue 3/12/2019, Until Mon 6/10/2019, Normal      diazepam (VALIUM) 5 mg tablet Take 1 tablet (5 mg total) by mouth 2 (two) times a day for 10 days As needed for muscle relaxation to help w back pain, Starting Wed 2/24/2021, Until Sat 3/6/2021, Normal      Diclofenac Sodium (VOLTAREN) 1 % Apply 2 g topically 4 (four) times a day, Starting Wed 2/24/2021, Normal      !! methocarbamol (ROBAXIN) 500 mg tablet Take 1 tablet (500 mg total) by mouth 3 (three) times a day as needed for muscle spasms, Starting Fri 9/6/2019, Normal      methylPREDNISolone 4 MG tablet therapy pack Use as directed on package, Normal      !! naproxen (NAPROSYN) 500 mg tablet Take 1 tablet (500 mg total) by mouth 2 (two) times a day with meals As needed for pain control, Starting Wed 2/24/2021, Normal      nystatin (MYCOSTATIN) cream Apply topically 2 (two) times a day, Starting Fri 6/7/2019, Normal      omeprazole (PriLOSEC) 20 mg delayed release capsule Take 1 capsule by mouth daily, Starting Thu 10/24/2013, Historical Med       !! - Potential duplicate medications found  Please discuss with provider  No discharge procedures on file      PDMP Review     None          ED Provider  Electronically Signed by           Sudha Beyrs  12/03/22 9270

## 2023-01-17 ENCOUNTER — APPOINTMENT (EMERGENCY)
Dept: RADIOLOGY | Facility: HOSPITAL | Age: 48
End: 2023-01-17

## 2023-01-17 ENCOUNTER — HOSPITAL ENCOUNTER (EMERGENCY)
Facility: HOSPITAL | Age: 48
Discharge: HOME | End: 2023-01-17
Attending: EMERGENCY MEDICINE

## 2023-01-17 VITALS
HEART RATE: 87 BPM | OXYGEN SATURATION: 94 % | DIASTOLIC BLOOD PRESSURE: 84 MMHG | TEMPERATURE: 97.9 F | RESPIRATION RATE: 18 BRPM | SYSTOLIC BLOOD PRESSURE: 139 MMHG

## 2023-01-17 DIAGNOSIS — S89.91XA INJURY OF RIGHT KNEE, INITIAL ENCOUNTER: ICD-10-CM

## 2023-01-17 DIAGNOSIS — S93.402A LEFT ANKLE SPRAIN: Primary | ICD-10-CM

## 2023-01-17 NOTE — DISCHARGE INSTRUCTIONS
Rest, elevate and ice ankle and knee as needed for pain  May take Tylenol or ibuprofen as needed for pain  Appointment with Ortho is symptoms has not resolved in 1 week

## 2023-01-17 NOTE — ED ATTENDING ATTESTATION
1/17/2023  IThelma, , saw and evaluated the patient  I have discussed the patient with the resident/non-physician practitioner and agree with the resident's/non-physician practitioner's findings, Plan of Care, and MDM as documented in the resident's/non-physician practitioner's note, except where noted  All available labs and Radiology studies were reviewed  I was present for key portions of any procedure(s) performed by the resident/non-physician practitioner and I was immediately available to provide assistance  At this point I agree with the current assessment done in the Emergency Department  I have conducted an independent evaluation of this patient a history and physical is as follows:    ED Course     Patient is a 69-year-old male past medical history of anxiety and depression, GERD presenting for ankle and knee injury  Patient states that roughly 3 hours ago while 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 he stepped onto a pallet went through the pallet with his left leg  He states that he twisted his left ankle and is now noting pain to the top of his forefoot, distal ankle which he states is intermittent, worse with walking, nonradiating and stabbing in nature  Has not take any medication for it  He states that he is not sure what happened to his right knee, denies any direct trauma but states that he while he was reloading his truck he would have intermittent pain with turning which radiated up his leg  Denies any head trauma, use of blood thinners, numbness or tingling  Patient is well-appearing at bedside with stable vitals and in no acute distress  He has no tenderness to the navicular or medial lateral malleoli and is ambulatory bedside with steady gait with intact motor, sensation, pulses and minimal edema  He has no edema to the right knee with negative ligamentous testing, also ambulatory with full intact range of motion  X-rays unremarkable    Have discussed knee immobilizer, crutches both of which patient has declined  We will place an Ace wrap with outpatient follow-up as needed      Critical Care Time  Procedures

## 2023-01-17 NOTE — ED PROVIDER NOTES
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 Taking?    Diclofenac Sodium (VOLTAREN) 1 %   No No   Sig: Apply 2 g topically 4 (four) times a day   buPROPion (ZYBAN) 150 MG 12 hr tablet   No No   Sig: Take 1 tablet (150 mg total) by mouth 2 (two) times a day for 90 days   diazepam (VALIUM) 5 mg tablet   No No   Sig: Take 1 tablet (5 mg total) by mouth 2 (two) times a day for 10 days As needed for muscle relaxation to help w back pain   methocarbamol (ROBAXIN) 500 mg tablet   No No   Sig: Take 1 tablet (500 mg total) by mouth 3 (three) times a day as needed for muscle spasms   methocarbamol (ROBAXIN) 500 mg tablet   No No   Sig: Take 1 tablet (500 mg total) by mouth 2 (two) times a day   methylPREDNISolone 4 MG tablet therapy pack   No No   Sig: Use as directed on package   naproxen (NAPROSYN) 500 mg tablet   No No   Sig: Take 1 tablet (500 mg total) by mouth 2 (two) times a day with meals As needed for pain control   naproxen (Naprosyn) 500 mg tablet   No No   Sig: Take 1 tablet (500 mg total) by mouth 2 (two) times a day with meals   nystatin (MYCOSTATIN) cream   No No   Sig: Apply topically 2 (two) times a day   omeprazole (PriLOSEC) 20 mg delayed release capsule   Yes No   Sig: Take 1 capsule by mouth daily      Facility-Administered Medications: None       Past Medical History:   Diagnosis Date • Anxiety and depression    • Depression    • Esophageal reflux    • Fracture of both hands    • Hair loss    • Heartburn    • Hiatal hernia     Resolved 7/24/2015    • Night sweats    • Sleep disturbance    • Starting and stopping of urinary stream during micturition    • Stomach problems    • Weight gain        Past Surgical History:   Procedure Laterality Date   • APPENDECTOMY  1984   • COLONOSCOPY     • ESOPHAGOGASTRODUODENOSCOPY      Diagnostic    • HAND SURGERY      Right in 1998 and left in 1991    • NOSE SURGERY      Nasal septal deviation repair        Family History   Problem Relation Age of Onset   • No Known Problems Mother    • Pancreatic cancer Maternal Grandmother    • Colon cancer Maternal Grandfather    • Dementia Paternal Grandmother      I have reviewed and agree with the history as documented  E-Cigarette/Vaping     E-Cigarette/Vaping Substances     Social History     Tobacco Use   • Smoking status: Former     Packs/day: 1 00     Years: 25 00     Pack years: 25 00     Types: Cigarettes   • Smokeless tobacco: Never   Substance Use Topics   • Alcohol use: Yes   • Drug use: Not Currently     Comment: Quit - As per [employer]        Review of Systems   Musculoskeletal: Positive for arthralgias and joint swelling  Skin: Negative for color change and wound  Neurological: Negative for numbness  Physical Exam  Physical Exam  Musculoskeletal:         General: Swelling and tenderness present  Right knee: Tenderness present  Left knee: Normal       Right ankle: Normal       Right Achilles Tendon: Normal       Left ankle: Tenderness present over the medial malleolus        Left Achilles Tendon: Normal       Right foot: Normal       Left foot: Normal          Vital Signs  ED Triage Vitals   Temperature Pulse Respirations Blood Pressure SpO2   01/17/23 1213 01/17/23 1213 01/17/23 1213 01/17/23 1213 01/17/23 1213   97 9 °F (36 6 °C) 87 18 139/84 94 %      Temp Source Heart Rate Source Patient Position - Orthostatic VS BP Location FiO2 (%)   01/17/23 1213 01/17/23 1213 01/17/23 1213 01/17/23 1213 --   Oral Monitor Sitting Left arm       Pain Score       01/17/23 1227       6           Vitals:    01/17/23 1213   BP: 139/84   Pulse: 87   Patient Position - Orthostatic VS: Sitting         Visual Acuity      ED Medications  Medications - No data to display    Diagnostic Studies  Results Reviewed     None                 XR ankle 2 views LEFT    (Results Pending)   XR knee 1 or 2 views right    (Results Pending)              Procedures  Procedures         ED Course                               SBIRT 20yo+    Flowsheet Row Most Recent Value   SBIRT (23 yo +)    In order to provide better care to our patients, we are screening all of our patients for alcohol and drug use  Would it be okay to ask you these screening questions? Yes Filed at: 01/17/2023 1231   Initial Alcohol Screen: US AUDIT-C     1  How often do you have a drink containing alcohol? 0 Filed at: 01/17/2023 1231   2  How many drinks containing alcohol do you have on a typical day you are drinking? 0 Filed at: 01/17/2023 1231   3a  Male UNDER 65: How often do you have five or more drinks on one occasion? 0 Filed at: 01/17/2023 1231   Audit-C Score 0 Filed at: 01/17/2023 1231   SHONA: How many times in the past year have you    Used an illegal drug or used a prescription medication for non-medical reasons? Never Filed at: 01/17/2023 1231                    Medical Decision Making  Patient is a 26-year-old male who presents to the ED with left ankle pain and right knee pain  Physical exam he has tenderness around left medial malleolus and tenderness with flexion and extension of right knee  No tenderness fifth metatarsal base  X-ray of knee and ankle with no fractures or dislocations  Patient advised to rest and ice joints and he may take Tylenol or ibuprofen as needed for swelling or pain    He may follow-up with Ortho as needed if pain does not resolve  Amount and/or Complexity of Data Reviewed  Radiology: ordered  Disposition  Final diagnoses:   Left ankle sprain   Injury of right knee, initial encounter     Time reflects when diagnosis was documented in both MDM as applicable and the Disposition within this note     Time User Action Codes Description Comment    1/17/2023  1:12 PM Fiorella Sawant Left ankle sprain     1/17/2023  1:12 PM Jil Franklin Add [[id number]] Injury of right knee, initial encounter       ED Disposition     ED Disposition   Discharge    Condition   Stable    Date/Time   Tue Jan 17, 2023  1:11 PM    [place] 92 discharge to home/self care  Follow-up Information     Follow up With Specialties Details Why Contact Info Additional [address] [facility]  As needed [address] [phone]  [address] [facility], [address] [address] [place], [place], [address] [facility]          Patient's Medications   Discharge Prescriptions    No medications on file       No discharge procedures on file      PDMP Review     None          ED Provider  Electronically Signed by           Shahnaz Rmaos PA-C  01/17/23 3699

## 2023-04-07 ENCOUNTER — OFFICE VISIT (OUTPATIENT)
Dept: FAMILY MEDICINE CLINIC | Facility: CLINIC | Age: 48
End: 2023-04-07

## 2023-04-07 VITALS
BODY MASS INDEX: 40.31 KG/M2 | WEIGHT: 289 LBS | OXYGEN SATURATION: 96 % | HEART RATE: 85 BPM | DIASTOLIC BLOOD PRESSURE: 79 MMHG | TEMPERATURE: 97.8 F | SYSTOLIC BLOOD PRESSURE: 131 MMHG

## 2023-04-07 DIAGNOSIS — Z12.11 SCREENING FOR COLON CANCER: ICD-10-CM

## 2023-04-07 DIAGNOSIS — J06.9 VIRAL URI WITH COUGH: Primary | ICD-10-CM

## 2023-04-07 PROBLEM — U07.1 COVID-19 VIRUS INFECTION: Status: RESOLVED | Noted: 2021-12-27 | Resolved: 2023-04-07

## 2023-04-07 RX ORDER — BROMPHENIRAMINE MALEATE, PSEUDOEPHEDRINE HYDROCHLORIDE, AND DEXTROMETHORPHAN HYDROBROMIDE 2; 30; 10 MG/5ML; MG/5ML; MG/5ML
5 SYRUP ORAL 4 TIMES DAILY PRN
Qty: 473 ML | Refills: 0 | Status: SHIPPED | OUTPATIENT
Start: 2023-04-07

## 2023-04-07 RX ORDER — OMEPRAZOLE 20 MG/1
20 CAPSULE, DELAYED RELEASE ORAL DAILY
COMMUNITY
End: 2023-04-07

## 2023-04-07 NOTE — PROGRESS NOTES
Kimberly Russ 1975 male MRN: [id number]    Acute Visit        ASSESSMENT/PLAN  Problem List Items Addressed This Visit    None  Visit Diagnoses     Viral URI with cough    -  Primary    Relevant Medications    brompheniramine-pseudoephedrine-DM 30-2-10 MG/5ML syrup    Screening for colon cancer        Relevant Orders    Ambulatory referral for colonoscopy          This is a viral upper respiratory infection  The expected course is 7-10 days  We reviewed OTC medications that are recommended to treat the symptoms  Tylenol or Motrin can be used as needed for pain or fevers  No future appointments  SUBJECTIVE  CC: Cough (Patient took a covid test this morning and it was negative, sx started on Sunday), Nasal Congestion, Headache, and Ears clogged        Sinus congestion, sore throat  Some cough  Started 5 days ago  Sore throat improved now more congested and coughing  Tried Mucinex and Dayquil  No fevers, chills, body aches     Kimberly Russ is a 46 y o  male who presented for an acute visit complaining of  Review of Systems   Constitutional: Negative for chills and fever  HENT: Positive for congestion and sore throat  Respiratory: Positive for cough  Negative for shortness of breath and wheezing          Historical Information   The patient history was reviewed as follows:  Past Medical History:   Diagnosis Date   • Anxiety and depression    • Depression    • Esophageal reflux    • Fracture of both hands    • Hair loss    • Heartburn    • Hiatal hernia     Resolved 7/24/2015    • Night sweats    • Sleep disturbance    • Starting and stopping of urinary stream during micturition    • Stomach problems    • Weight gain      Past Surgical History:   Procedure Laterality Date   • APPENDECTOMY  1984   • COLONOSCOPY     • ESOPHAGOGASTRODUODENOSCOPY      Diagnostic    • HAND SURGERY      Right in 1998 and left in 1991    • NOSE SURGERY      Nasal septal deviation repair      Family History Problem Relation Age of Onset   • No Known Problems Mother    • Pancreatic cancer Maternal Grandmother    • Colon cancer Maternal Grandfather    • Dementia Paternal Grandmother       Social History   Social History     Substance and Sexual Activity   Alcohol Use Yes     Social History     Substance and Sexual Activity   Drug Use Not Currently    Comment: Quit - As per [employer]      Social History     Tobacco Use   Smoking Status Former   • Packs/day: 1 00   • Years: 25 00   • Pack years: 25 00   • Types: Cigarettes   Smokeless Tobacco Never       Medications:   Meds/Allergies   Current Outpatient Medications   Medication Sig Dispense Refill   • brompheniramine-pseudoephedrine-DM 30-2-10 MG/5ML syrup Take 5 mL by mouth 4 (four) times a day as needed for congestion or cough 473 mL 0   • omeprazole (PriLOSEC) 20 mg delayed release capsule Take 1 capsule by mouth daily       No current facility-administered medications for this visit  Allergies   Allergen Reactions   • Tramadol Hives   • Clindamycin Rash     Category: Allergy;        OBJECTIVE  Vitals:   Vitals:    04/07/23 1001   BP: 131/79   Pulse: 85   Temp: 97 8 °F (36 6 °C)   SpO2: 96%   Weight: 131 kg (289 lb)       Invasive Devices     None                 Physical Exam  Vitals and nursing note reviewed  Constitutional:       General: He is not in acute distress  Appearance: He is well-developed  HENT:      Right Ear: Hearing, tympanic membrane, ear canal and external ear normal       Left Ear: Hearing, tympanic membrane, ear canal and external ear normal       Nose: Nose normal       Mouth/Throat:      Pharynx: Uvula midline  No oropharyngeal exudate or posterior oropharyngeal erythema  Eyes:      Conjunctiva/sclera: Conjunctivae normal       Pupils: Pupils are equal, round, and reactive to light  Cardiovascular:      Rate and Rhythm: Normal rate  Heart sounds: Normal heart sounds  No murmur heard  No friction rub  No gallop  Pulmonary:      Effort: Pulmonary effort is normal  No respiratory distress  Breath sounds: Normal breath sounds  No wheezing or rales  Lymphadenopathy:      Cervical: No cervical adenopathy  Skin:     General: Skin is warm  Findings: No rash  Neurological:      Mental Status: He is alert and oriented to person, place, and time  Psychiatric:         Behavior: Behavior normal          Thought Content: Thought content normal          Judgment: Judgment normal           Lab:  I have personally reviewed all pertinent results

## 2023-04-12 DIAGNOSIS — Z12.5 SCREENING PSA (PROSTATE SPECIFIC ANTIGEN): Primary | ICD-10-CM

## 2023-04-12 DIAGNOSIS — R39.198 DIFFICULTY URINATING: ICD-10-CM

## 2023-04-24 DIAGNOSIS — J06.9 UPPER RESPIRATORY TRACT INFECTION, UNSPECIFIED TYPE: Primary | ICD-10-CM

## 2023-04-24 RX ORDER — AMOXICILLIN 875 MG/1
875 TABLET, COATED ORAL 2 TIMES DAILY
Qty: 20 TABLET | Refills: 0 | Status: SHIPPED | OUTPATIENT
Start: 2023-04-24 | End: 2023-05-04

## 2023-05-04 ENCOUNTER — CONSULT (OUTPATIENT)
Dept: GASTROENTEROLOGY | Facility: CLINIC | Age: 48
End: 2023-05-04

## 2023-05-04 VITALS
SYSTOLIC BLOOD PRESSURE: 138 MMHG | WEIGHT: 283.2 LBS | BODY MASS INDEX: 39.65 KG/M2 | HEIGHT: 71 IN | DIASTOLIC BLOOD PRESSURE: 84 MMHG | HEART RATE: 88 BPM

## 2023-05-04 DIAGNOSIS — K21.9 CHRONIC GERD: ICD-10-CM

## 2023-05-04 DIAGNOSIS — Z12.11 SCREENING FOR COLON CANCER: Primary | ICD-10-CM

## 2023-05-04 DIAGNOSIS — K52.9 CHRONIC DIARRHEA: ICD-10-CM

## 2023-05-04 NOTE — PROGRESS NOTES
[facility] - Outpatient Consultation  Lakeshia Feliciano 46 y o  male MRN: [id number]  Encounter: [id number]          ASSESSMENT AND PLAN:      1  Screening for colon cancer  - Schedule colonoscopy with miralax prep for colon cancer screening; he does have a history of polyps on a prior colonoscopy about 15 years ago      2  Chronic GERD  - He has chronic GERD for 20-25 years which is well controlled on omeprazole 20 mg daily  - We will schedule him for an EGD to screen for Stubbs's given his risk factors (obesity, , chronic reflux, prior smoking history)      3  Chronic diarrhea  - Reports chronic diarrhea for 20-25 years without associated urgency, incontinence, blood in the stool, or abdominal pain  - Pt reports no prior abnormalities to explain diarrhea on prior endoscopic procedures  - Can biopsy for celiac disease during his EGD as well as evaluate for microscopic colitis/IBD during his colonoscopy        ______________________________________________________________________    HPI:  Fabiana Snider is a 47 yo M with a PMH of chronic GERD, hiatal hernia, presenting to schedule a colonoscopy  He reports he had a colonoscopy about 15 years ago and had several polyps removed  He also reports a family history of colon cancer in his grandfather  He denies any recent change in bowel habits, blood in the stool, or abdominal pain  He does have chronic diarrhea for 20-25 years without urgency or associated abdominal pain  He has dealt with chronic reflux for about 20-25 years as well and this is well controlled with omeprazole 20 mg daily as long as he takes this daily  He had an EGD 15-20 years ago and was told he had a hiatal hernia, no other abnormalities  He denies dysphagia, nausea, or vomiting  REVIEW OF SYSTEMS:    CONSTITUTIONAL: Denies any fever, chills, rigors, and weight loss  HEENT: No earache or tinnitus  Denies hearing loss or visual disturbances    CARDIOVASCULAR: No chest pain or palpitations  RESPIRATORY: Denies any cough, hemoptysis, shortness of breath or dyspnea on exertion  GASTROINTESTINAL: As noted in the History of Present Illness  GENITOURINARY: No problems with urination  Denies any hematuria or dysuria  NEUROLOGIC: No dizziness or vertigo, denies headaches  MUSCULOSKELETAL: Denies any muscle or joint pain  SKIN: Denies skin rashes or itching  ENDOCRINE: Denies excessive thirst  Denies intolerance to heat or cold  PSYCHOSOCIAL: Denies depression or anxiety  Denies any recent memory loss         Historical Information   Past Medical History:   Diagnosis Date    Anxiety and depression     Depression     Esophageal reflux     Fracture of both hands     Hair loss     Heartburn     Hiatal hernia     Resolved 7/24/2015     Night sweats     Sleep disturbance     Starting and stopping of urinary stream during micturition     Stomach problems     Weight gain      Past Surgical History:   Procedure Laterality Date    APPENDECTOMY  1984    COLONOSCOPY      ESOPHAGOGASTRODUODENOSCOPY      Diagnostic     HAND SURGERY      Right in 1998 and left in [address]      Nasal septal deviation repair      Social History   Social History     Substance and Sexual Activity   Alcohol Use Yes    Comment: weekends     Social History     Substance and Sexual Activity   Drug Use Not Currently    Comment: Quit - As per [employer]      Social History     Tobacco Use   Smoking Status Former    Packs/day: 1 00    Years: 25 00    Pack years: 25 00    Types: Cigarettes   Smokeless Tobacco Never     Family History   Problem Relation Age of Onset    No Known Problems Mother     Pancreatic cancer Maternal Grandmother     Colon cancer Maternal Grandfather     Dementia Paternal Grandmother        Meds/Allergies       Current Outpatient Medications:     omeprazole (PriLOSEC) 20 mg delayed release capsule    Allergies   Allergen Reactions    Tramadol Hives    "Clindamycin Rash     Category: Allergy;            Objective     Blood pressure 138/84, pulse 88, height 5' 11\" (1 803 m), weight 128 kg (283 lb 3 2 oz)  Body mass index is 39 5 kg/m²  PHYSICAL EXAM:      General Appearance:   Alert, cooperative, no distress   HEENT:   Normocephalic, atraumatic, anicteric      Neck:  Supple, symmetrical, trachea midline   Lungs:   Clear to auscultation bilaterally; no rales, rhonchi or wheezing; respirations unlabored    Heart[de-identified]   Regular rate and rhythm; no murmur, rub, or gallop  Abdomen:   Soft, non-tender, non-distended; normal bowel sounds; no masses, no organomegaly    Genitalia:   Deferred    Rectal:   Deferred    Extremities:  No cyanosis, clubbing or edema    Pulses:  2+ and symmetric    Skin:  No jaundice, rashes, or lesions    Lymph nodes:  No palpable cervical lymphadenopathy        Lab Results:   No visits with results within 1 Day(s) from this visit  Latest known visit with results is:   Appointment on 04/13/2023   Component Date Value    PSA 04/13/2023 0 7          Radiology Results:   No results found    "

## 2023-05-05 NOTE — PATIENT INSTRUCTIONS
Scheduled date of EGD/colonoscopy (as of today):8/14/23  Physician performing EGD/colonoscopy: Ernesto  Location of EGD/colonoscopy:[facility]  Desired bowel prep reviewed with patient:miralax/dulcolax  Instructions reviewed with patient by:Amanda PINEDO  Clearances:   none    Spoke with Zenaida Vásquez, patients wife to schedule this date

## 2023-08-14 ENCOUNTER — ANESTHESIA (OUTPATIENT)
Dept: GASTROENTEROLOGY | Facility: HOSPITAL | Age: 48
End: 2023-08-14

## 2023-08-14 ENCOUNTER — HOSPITAL ENCOUNTER (OUTPATIENT)
Dept: GASTROENTEROLOGY | Facility: HOSPITAL | Age: 48
Setting detail: OUTPATIENT SURGERY
Discharge: HOME | End: 2023-08-14
Payer: COMMERCIAL

## 2023-08-14 ENCOUNTER — ANESTHESIA EVENT (OUTPATIENT)
Dept: GASTROENTEROLOGY | Facility: HOSPITAL | Age: 48
End: 2023-08-14

## 2023-08-14 ENCOUNTER — TELEPHONE (OUTPATIENT)
Age: 48
End: 2023-08-14

## 2023-08-14 VITALS
HEIGHT: 70 IN | RESPIRATION RATE: 22 BRPM | DIASTOLIC BLOOD PRESSURE: 71 MMHG | TEMPERATURE: 97.8 F | BODY MASS INDEX: 40.59 KG/M2 | HEART RATE: 70 BPM | OXYGEN SATURATION: 96 % | WEIGHT: 283.51 LBS | SYSTOLIC BLOOD PRESSURE: 115 MMHG

## 2023-08-14 DIAGNOSIS — K21.9 CHRONIC GERD: ICD-10-CM

## 2023-08-14 DIAGNOSIS — Z12.11 SCREENING FOR COLON CANCER: ICD-10-CM

## 2023-08-14 PROBLEM — F41.9 ANXIETY: Status: ACTIVE | Noted: 2023-08-14

## 2023-08-14 PROCEDURE — 88305 TISSUE EXAM BY PATHOLOGIST: CPT | Performed by: PATHOLOGY

## 2023-08-14 PROCEDURE — 45380 COLONOSCOPY AND BIOPSY: CPT | Performed by: INTERNAL MEDICINE

## 2023-08-14 PROCEDURE — 43239 EGD BIOPSY SINGLE/MULTIPLE: CPT | Performed by: INTERNAL MEDICINE

## 2023-08-14 RX ORDER — SODIUM CHLORIDE, SODIUM LACTATE, POTASSIUM CHLORIDE, CALCIUM CHLORIDE 600; 310; 30; 20 MG/100ML; MG/100ML; MG/100ML; MG/100ML
125 INJECTION, SOLUTION INTRAVENOUS CONTINUOUS
Status: DISCONTINUED | OUTPATIENT
Start: 2023-08-14 | End: 2023-08-18 | Stop reason: HOSPADM

## 2023-08-14 RX ORDER — PANTOPRAZOLE SODIUM 40 MG/1
40 TABLET, DELAYED RELEASE ORAL DAILY
Qty: 90 TABLET | Refills: 4 | Status: SHIPPED | OUTPATIENT
Start: 2023-08-14

## 2023-08-14 RX ORDER — SODIUM CHLORIDE, SODIUM LACTATE, POTASSIUM CHLORIDE, CALCIUM CHLORIDE 600; 310; 30; 20 MG/100ML; MG/100ML; MG/100ML; MG/100ML
INJECTION, SOLUTION INTRAVENOUS CONTINUOUS PRN
Status: DISCONTINUED | OUTPATIENT
Start: 2023-08-14 | End: 2023-08-14

## 2023-08-14 RX ORDER — CHOLESTYRAMINE 4 G/9G
1 POWDER, FOR SUSPENSION ORAL 2 TIMES DAILY WITH MEALS
Qty: 60 PACKET | Refills: 6 | Status: SHIPPED | OUTPATIENT
Start: 2023-08-14

## 2023-08-14 RX ORDER — PROPOFOL 10 MG/ML
INJECTION, EMULSION INTRAVENOUS AS NEEDED
Status: DISCONTINUED | OUTPATIENT
Start: 2023-08-14 | End: 2023-08-14

## 2023-08-14 RX ORDER — LIDOCAINE HYDROCHLORIDE 10 MG/ML
0.5 INJECTION, SOLUTION EPIDURAL; INFILTRATION; INTRACAUDAL; PERINEURAL ONCE AS NEEDED
Status: DISCONTINUED | OUTPATIENT
Start: 2023-08-14 | End: 2023-08-18 | Stop reason: HOSPADM

## 2023-08-14 RX ORDER — FENTANYL CITRATE 50 UG/ML
INJECTION, SOLUTION INTRAMUSCULAR; INTRAVENOUS AS NEEDED
Status: DISCONTINUED | OUTPATIENT
Start: 2023-08-14 | End: 2023-08-14

## 2023-08-14 RX ORDER — LIDOCAINE HYDROCHLORIDE 20 MG/ML
INJECTION, SOLUTION EPIDURAL; INFILTRATION; INTRACAUDAL; PERINEURAL AS NEEDED
Status: DISCONTINUED | OUTPATIENT
Start: 2023-08-14 | End: 2023-08-14

## 2023-08-14 RX ADMIN — SODIUM CHLORIDE, SODIUM LACTATE, POTASSIUM CHLORIDE, AND CALCIUM CHLORIDE: .6; .31; .03; .02 INJECTION, SOLUTION INTRAVENOUS at 08:50

## 2023-08-14 RX ADMIN — PROPOFOL 70 MG: 10 INJECTION, EMULSION INTRAVENOUS at 09:24

## 2023-08-14 RX ADMIN — PROPOFOL 50 MG: 10 INJECTION, EMULSION INTRAVENOUS at 09:30

## 2023-08-14 RX ADMIN — PROPOFOL 200 MG: 10 INJECTION, EMULSION INTRAVENOUS at 09:18

## 2023-08-14 RX ADMIN — FENTANYL CITRATE 50 MCG: 50 INJECTION, SOLUTION INTRAMUSCULAR; INTRAVENOUS at 09:12

## 2023-08-14 RX ADMIN — LIDOCAINE HYDROCHLORIDE 5 ML: 20 INJECTION, SOLUTION EPIDURAL; INFILTRATION; INTRACAUDAL; PERINEURAL at 09:18

## 2023-08-14 NOTE — TELEPHONE ENCOUNTER
Caller: Patients wife Kacie Ao    Doctor: Brigida Prince III    Reason for call: Patients wife is calling to check  On a script that needs a PA  cholestyramine (QUESTRAN) 4 g packet         Call back#: [phone]

## 2023-08-14 NOTE — H&P
History and Physical -  [facility]  Evangelina Flores 46 y.o. male MRN: [id number]                  HPI: Evangelina Flores is a 46y.o. year old male who presents for endoscopy and colonoscopy for GERD diarrhea and history of polyp      REVIEW OF SYSTEMS: Per the HPI, and otherwise unremarkable. Historical Information   Past Medical History:   Diagnosis Date   • Anxiety and depression    • Colon polyp    • Depression    • Esophageal reflux    • Fracture of both hands    • Hair loss    • Heartburn    • Hiatal hernia     Resolved 7/24/2015    • Night sweats    • Sleep disturbance    • Starting and stopping of urinary stream during micturition    • Stomach problems    • Weight gain      Past Surgical History:   Procedure Laterality Date   • APPENDECTOMY  1984   • COLONOSCOPY     • ESOPHAGOGASTRODUODENOSCOPY      Diagnostic    • HAND SURGERY      Right in 1998 and left in 1991    • NOSE SURGERY      Nasal septal deviation repair      Social History   Social History     Substance and Sexual Activity   Alcohol Use Yes    Comment: weekends     Social History     Substance and Sexual Activity   Drug Use Not Currently    Comment: Quit - As per [employer]      Social History     Tobacco Use   Smoking Status Former   • Packs/day: 1.00   • Years: 25.00   • Total pack years: 25.00   • Types: Cigarettes   Smokeless Tobacco Never     Family History   Problem Relation Age of Onset   • No Known Problems Mother    • Pancreatic cancer Maternal Grandmother    • Colon cancer Maternal Grandfather    • Dementia Paternal Grandmother        Meds/Allergies     (Not in a hospital admission)      Allergies   Allergen Reactions   • Tramadol Hives   • Clindamycin Rash     Category: Allergy;        Objective     Blood pressure 126/89, pulse 81, temperature 97.7 °F (36.5 °C), temperature source Temporal, resp. rate 18, height 5' 10" (1.778 m), weight 129 kg (283 lb 8.2 oz), SpO2 96 %.       PHYSICAL EXAM    /89   Pulse 81   Temp 97.7 °F (36.5 °C) (Temporal)   Resp 18   Ht 5' 10" (1.778 m)   Wt 129 kg (283 lb 8.2 oz)   SpO2 96%   BMI 40.68 kg/m²       Gen: NAD  CV: RRR  CHEST: Clear  ABD: soft, NT/ND  EXT: no edema      ASSESSMENT/PLAN:  This is a 46y.o. year old male here for endoscopy and colonoscopy, and he is stable and optimized for his procedure.

## 2023-08-14 NOTE — TELEPHONE ENCOUNTER
Called [place] and [facility] that the Pa will get started today. We were not aware that the medication needs a prior auth unless the pharmacy call or pt calls to let us know. Please initiate prior auth for medication. Thank you.

## 2023-08-14 NOTE — ANESTHESIA POSTPROCEDURE EVALUATION
Post-Op Assessment Note    CV Status:  Stable    Pain management: adequate     Mental Status:  Alert and awake   Hydration Status:  Euvolemic   PONV Controlled:  Controlled   Airway Patency:  Patent      Post Op Vitals Reviewed: Yes            No notable events documented.     /59 (08/14/23 0937)    Temp 97.8 °F (36.6 °C) (08/14/23 0937)    Pulse 89 (08/14/23 0937)   Resp 12 (08/14/23 0937)    SpO2 93 % (08/14/23 0937)

## 2023-08-14 NOTE — TELEPHONE ENCOUNTER
Called pharamcy  BIN  [id number]  [facility]  [id number]  [facility]  [id number]  ID  [id number]  EMPRIX      Pharmacy stated need to try colesevelam 625 mg tab first

## 2023-08-14 NOTE — TELEPHONE ENCOUNTER
Kenia Duckworth  Was told pt has to try step therapy first and has to try cholestyramine hydrochloride 625 mg tablets first, if Provider doesn't want to do this medication first then a letter of necessity needs to be written as of why.   Routing to GI office

## 2023-08-14 NOTE — ANESTHESIA PREPROCEDURE EVALUATION
Procedure:  COLONOSCOPY  EGD    Relevant Problems   ANESTHESIA (within normal limits)   (-) History of anesthesia complications      CARDIO   (-) Chest pain   (-) DONNELLY (dyspnea on exertion)      GI/HEPATIC   (+) GERD (gastroesophageal reflux disease)      NEURO/PSYCH   (+) Anxiety      PULMONARY   (-) Shortness of breath   (-) URI (upper respiratory infection)        Physical Exam    Airway    Mallampati score: II  TM Distance: >3 FB  Neck ROM: full     Dental       Cardiovascular      Pulmonary      Other Findings        Anesthesia Plan  ASA Score- 2     Anesthesia Type- IV sedation with anesthesia with ASA Monitors. Additional Monitors:   Airway Plan:           Plan Factors-Exercise tolerance (METS): >4 METS. Chart reviewed. EKG reviewed. Existing labs reviewed. Patient summary reviewed. Induction- intravenous. Postoperative Plan-     Informed Consent- Anesthetic plan and risks discussed with patient. I personally reviewed this patient with the CRNA. Discussed and agreed on the Anesthesia Plan with the CRNA. Ericka Armando

## 2023-08-15 NOTE — TELEPHONE ENCOUNTER
Winsome Martini @ [facility] Power packets [id number] are authorized for the patient.     Any questions: [phone]

## 2023-08-16 NOTE — TELEPHONE ENCOUNTER
Spoke with allen from the pharmacy. It is still showing it needs a prior auth. Called richard at [facility].  LMOM to call me back in regards to prior auth

## 2023-08-31 ENCOUNTER — HOSPITAL ENCOUNTER (OUTPATIENT)
Dept: RADIOLOGY | Facility: HOSPITAL | Age: 48
End: 2023-08-31
Payer: COMMERCIAL

## 2023-08-31 DIAGNOSIS — G89.29 CHRONIC RIGHT SHOULDER PAIN: ICD-10-CM

## 2023-08-31 DIAGNOSIS — M25.511 CHRONIC RIGHT SHOULDER PAIN: Primary | ICD-10-CM

## 2023-08-31 DIAGNOSIS — M25.511 CHRONIC RIGHT SHOULDER PAIN: ICD-10-CM

## 2023-08-31 DIAGNOSIS — G89.29 CHRONIC RIGHT SHOULDER PAIN: Primary | ICD-10-CM

## 2023-08-31 PROCEDURE — 73030 X-RAY EXAM OF SHOULDER: CPT

## 2023-09-11 DIAGNOSIS — G89.29 CHRONIC RIGHT SHOULDER PAIN: Primary | ICD-10-CM

## 2023-09-11 DIAGNOSIS — M25.511 CHRONIC RIGHT SHOULDER PAIN: Primary | ICD-10-CM

## 2023-09-18 ENCOUNTER — OFFICE VISIT (OUTPATIENT)
Dept: OBGYN CLINIC | Facility: CLINIC | Age: 48
End: 2023-09-18
Payer: COMMERCIAL

## 2023-09-18 VITALS
BODY MASS INDEX: 41.37 KG/M2 | HEART RATE: 79 BPM | WEIGHT: 289 LBS | SYSTOLIC BLOOD PRESSURE: 130 MMHG | HEIGHT: 70 IN | DIASTOLIC BLOOD PRESSURE: 82 MMHG

## 2023-09-18 DIAGNOSIS — M25.511 ACUTE PAIN OF RIGHT SHOULDER: ICD-10-CM

## 2023-09-18 DIAGNOSIS — M24.811 INTERNAL DERANGEMENT OF RIGHT SHOULDER: Primary | ICD-10-CM

## 2023-09-18 DIAGNOSIS — Z87.39 HISTORY OF ROTATOR CUFF TEAR: ICD-10-CM

## 2023-09-18 PROCEDURE — 99204 OFFICE O/P NEW MOD 45 MIN: CPT | Performed by: ORTHOPAEDIC SURGERY

## 2023-09-18 NOTE — PROGRESS NOTES
Patient Name:  Gregg Stakr  MRN:  [id number]    Assessment & Plan     1. Chronic right shoulder pain  -     Ambulatory Referral to Orthopedic Surgery        46 y.o. male with {Right, left-initial cap:5607} knee ***. X-rays reviewed in office today with patient. ***    Chief Complaint     {Right, left-initial cap:5607} knee pain    History of the Present Illness     Gregg Stark is a 46 y.o. male with {Right, left-initial cap:5607} knee pain ***. ***    Review of Systems     Review of Systems    Physical Exam     /82   Pulse 79   Ht 5' 10" (1.778 m)   Wt 131 kg (289 lb)   BMI 41.47 kg/m²     {RIGHT/LEFT:26871}Knee  Range of motion from *** to ***. There is *** crepitus with range of motion. There is *** effusion. There is *** tenderness over the ***. There is ***/5 quadriceps strength and *** tone. The patient is {able/unable:04440::"able"} to perform a straight leg raise. {NEGATIVE/POSITIVE:16465::"negative"} patellar grind test.  Anterior drawer tests is {NEGATIVE/POSITIVE:51483::"negative"}  {NEGATIVE/POSITIVE:08771::"negative"} Lachman Test.   Posterior drawer test is   {NEGATIVE/POSITIVE:34721::"negative"}   Varus stress testing reveals *** at 0 and 30 degrees   Valgus stress testing reveals *** at 0 and 30 degrees  Naye's testing is {NEGATIVE/POSITIVE:54413::"negative"}   The patient is neurovascular intact distally. Eyes:  Anicteric sclerae. Neck:  Supple. Lungs:  Normal respiratory effort. Cardiovascular:  Capillary refill is less than 2 seconds. Skin:  Intact without erythema. Neurologic:  Sensation grossly intact to light touch. Psychiatric:  Mood and affect are appropriate.     Data Review     I have personally reviewed pertinent films in PACS, and my interpretation follows:    X-rays taken *** of {RIGHT/LEFT:11766}knee independently reviewed*** and demonstrate ***    Past Medical History:   Diagnosis Date   • Anxiety and depression    • Colon polyp    • Depression    • Esophageal reflux    • Fracture of both hands    • Hair loss    • Heartburn    • Hiatal hernia     Resolved 7/24/2015    • Night sweats    • Sleep disturbance    • Starting and stopping of urinary stream during micturition    • Stomach problems    • Weight gain        Past Surgical History:   Procedure Laterality Date   • APPENDECTOMY  1984   • COLONOSCOPY     • ESOPHAGOGASTRODUODENOSCOPY      Diagnostic    • HAND SURGERY      Right in 1998 and left in 1991    • NOSE SURGERY      Nasal septal deviation repair        Allergies   Allergen Reactions   • Tramadol Hives   • Clindamycin Rash     Category: Allergy;        Current Outpatient Medications on File Prior to Visit   Medication Sig Dispense Refill   • pantoprazole (PROTONIX) 40 mg tablet Take 1 tablet (40 mg total) by mouth daily 90 tablet 4   • [DISCONTINUED] cholestyramine (QUESTRAN) 4 g packet Take 1 packet (4 g total) by mouth 2 (two) times a day with meals 60 packet 6     No current facility-administered medications on file prior to visit.        Social History     Tobacco Use   • Smoking status: Former     Packs/day: 1.00     Years: 25.00     Total pack years: 25.00     Types: Cigarettes   • Smokeless tobacco: Never   Vaping Use   • Vaping Use: Never used   Substance Use Topics   • Alcohol use: Yes     Comment: weekends   • Drug use: Not Currently     Comment: Quit - As per [employer]        Family History   Problem Relation Age of Onset   • No Known Problems Mother    • Pancreatic cancer Maternal Grandmother    • Colon cancer Maternal Grandfather    • Dementia Paternal Grandmother              Procedures Performed     Procedures  ***      Ashley Crimes

## 2023-09-18 NOTE — PROGRESS NOTES
Patient Name:  Jagruti Em  MRN:  [id number]    Assessment & Plan     1. Internal derangement of right shoulder  -     MRI shoulder right wo contrast; Future; Expected date: 09/18/2023    2. Acute pain of right shoulder  -     Ambulatory Referral to Orthopedic Surgery  -     MRI shoulder right wo contrast; Future; Expected date: 09/18/2023    3. History of rotator cuff tear  -     MRI shoulder right wo contrast; Future; Expected date: 09/18/2023        46 y.o. male with acute worsening of right shoulder pain with history of supraspinatus tear, concern for increasing tear size. · X-rays reviewed in office today with patient. · Based on the patients physical exam with weakness and positive special testing, worsening pain, and old MRI reporting a full thickness supraspinatus tear, a new MRI study was ordered to visualize tear and assess tear size and tendon/muscle quality to determine if tear is repairable. · Continue to take OTC NSAIDs for pain management for the elbow and shoulder. Maintain shoulder ROM is home exercises in the interim. · Follow up after MRI results      Chief Complaint     Right shoulder pain    History of the Present Illness     Jagruti Em is a RHD 46 y.o. male with Right shoulder pain that started over 3 weeks ago. He woke up one morning and could barely lift his arm without pain. He normally sleeps on his side with his arm up. Pain wakes him up at night. He is a  and has still been working. Pain is rated 5-8/10. He thinks one time he was told he has a slight tear in his rotator cuff 8 or 9 years ago. He did nothing to treat this and the pain just went away. He recently started having pain in his elbow since Thursday. Review of Systems     Review of Systems   Constitutional: Negative for chills and fever. HENT: Negative for ear pain and sore throat. Eyes: Negative for pain and visual disturbance. Respiratory: Negative for cough and shortness of breath. Cardiovascular: Negative for chest pain and palpitations. Gastrointestinal: Negative for abdominal pain and vomiting. Genitourinary: Negative for dysuria and hematuria. Musculoskeletal: Positive for arthralgias. Negative for back pain. Skin: Negative for color change and rash. Neurological: Negative for seizures and syncope. All other systems reviewed and are negative. Physical Exam     /82   Pulse 79   Ht 5' 10" (1.778 m)   Wt 131 kg (289 lb)   BMI 41.47 kg/m²     Right Shoulder: Active range of motion   160 degrees forward flexion  160 degrees abduction  60 degrees external rotation   1 level restriction internal rotation    There is 4/5 strength with supraspinatus testing. There is 5/5 strength with infraspinatus testing. There is 4/5 strength with subscapularis testing. There is tenderness present over the greater tubercle. Cotter test is positive  Speed's test is Positive  The patient is neurovascularly intact distally in the extremity. Eyes:  Anicteric sclerae. Neck:  Supple. Lungs:  Normal respiratory effort. Cardiovascular:  Capillary refill is less than 2 seconds. Skin:  Intact without erythema. Neurologic:  Sensation grossly intact to light touch. Psychiatric:  Mood and affect are appropriate. Data Review     I have personally reviewed pertinent films in PACS, and my interpretation follows:    X-rays taken 8/31/23 of Right shoulder demonstrate no fracture or dislocation. Joint spaces relatively well maintained.      Past Medical History:   Diagnosis Date   • Anxiety and depression    • Colon polyp    • Depression    • Esophageal reflux    • Fracture of both hands    • Hair loss    • Heartburn    • Hiatal hernia     Resolved 7/24/2015    • Night sweats    • Sleep disturbance    • Starting and stopping of urinary stream during micturition    • Stomach problems    • Weight gain        Past Surgical History:   Procedure Laterality Date   • APPENDECTOMY  1984 • COLONOSCOPY     • ESOPHAGOGASTRODUODENOSCOPY      Diagnostic    • HAND SURGERY      Right in 1998 and left in 1991    • NOSE SURGERY      Nasal septal deviation repair        Allergies   Allergen Reactions   • Tramadol Hives   • Clindamycin Rash     Category: Allergy;        Current Outpatient Medications on File Prior to Visit   Medication Sig Dispense Refill   • pantoprazole (PROTONIX) 40 mg tablet Take 1 tablet (40 mg total) by mouth daily 90 tablet 4   • [DISCONTINUED] cholestyramine (QUESTRAN) 4 g packet Take 1 packet (4 g total) by mouth 2 (two) times a day with meals 60 packet 6     No current facility-administered medications on file prior to visit.        Social History     Tobacco Use   • Smoking status: Former     Packs/day: 1.00     Years: 25.00     Total pack years: 25.00     Types: Cigarettes   • Smokeless tobacco: Never   Vaping Use   • Vaping Use: Never used   Substance Use Topics   • Alcohol use: Yes     Comment: weekends   • Drug use: Not Currently     Comment: Quit - As per [employer]        Family History   Problem Relation Age of Onset   • No Known Problems Mother    • Pancreatic cancer Maternal Grandmother    • Colon cancer Maternal Grandfather    • Dementia Paternal Grandmother              Procedures Performed     Procedures  None performed      Gio Castaneda DO  Scribe Attestation    I,:  Doug Wheeler am acting as a scribe while in the presence of the attending physician.:       I,:  Gio Castaneda DO personally performed the services described in this documentation    as scribed in my presence.:

## 2023-09-28 ENCOUNTER — HOSPITAL ENCOUNTER (OUTPATIENT)
Dept: MRI IMAGING | Facility: CLINIC | Age: 48
Discharge: HOME | End: 2023-09-28
Payer: COMMERCIAL

## 2023-09-28 DIAGNOSIS — M25.511 ACUTE PAIN OF RIGHT SHOULDER: ICD-10-CM

## 2023-09-28 DIAGNOSIS — M24.811 INTERNAL DERANGEMENT OF RIGHT SHOULDER: ICD-10-CM

## 2023-09-28 DIAGNOSIS — Z87.39 HISTORY OF ROTATOR CUFF TEAR: ICD-10-CM

## 2023-09-28 PROCEDURE — G1004 CDSM NDSC: HCPCS

## 2023-09-28 PROCEDURE — 73221 MRI JOINT UPR EXTREM W/O DYE: CPT

## 2023-10-03 ENCOUNTER — OFFICE VISIT (OUTPATIENT)
Dept: OBGYN CLINIC | Facility: CLINIC | Age: 48
End: 2023-10-03
Payer: COMMERCIAL

## 2023-10-03 VITALS
WEIGHT: 289 LBS | BODY MASS INDEX: 41.37 KG/M2 | HEART RATE: 85 BPM | SYSTOLIC BLOOD PRESSURE: 125 MMHG | DIASTOLIC BLOOD PRESSURE: 85 MMHG | HEIGHT: 70 IN

## 2023-10-03 DIAGNOSIS — G89.29 CHRONIC RIGHT SHOULDER PAIN: ICD-10-CM

## 2023-10-03 DIAGNOSIS — M75.121 COMPLETE TEAR OF RIGHT ROTATOR CUFF, UNSPECIFIED WHETHER TRAUMATIC: Primary | ICD-10-CM

## 2023-10-03 DIAGNOSIS — M25.511 CHRONIC RIGHT SHOULDER PAIN: ICD-10-CM

## 2023-10-03 PROCEDURE — 99214 OFFICE O/P EST MOD 30 MIN: CPT | Performed by: ORTHOPAEDIC SURGERY

## 2023-10-03 PROCEDURE — 20610 DRAIN/INJ JOINT/BURSA W/O US: CPT | Performed by: ORTHOPAEDIC SURGERY

## 2023-10-03 RX ORDER — METHYLPREDNISOLONE ACETATE 40 MG/ML
2 INJECTION, SUSPENSION INTRA-ARTICULAR; INTRALESIONAL; INTRAMUSCULAR; SOFT TISSUE
Status: COMPLETED | OUTPATIENT
Start: 2023-10-03 | End: 2023-10-03

## 2023-10-03 RX ORDER — BUPIVACAINE HYDROCHLORIDE 2.5 MG/ML
2 INJECTION, SOLUTION INFILTRATION; PERINEURAL
Status: COMPLETED | OUTPATIENT
Start: 2023-10-03 | End: 2023-10-03

## 2023-10-03 RX ORDER — LIDOCAINE HYDROCHLORIDE 10 MG/ML
2 INJECTION, SOLUTION INFILTRATION; PERINEURAL
Status: COMPLETED | OUTPATIENT
Start: 2023-10-03 | End: 2023-10-03

## 2023-10-03 RX ADMIN — BUPIVACAINE HYDROCHLORIDE 2 ML: 2.5 INJECTION, SOLUTION INFILTRATION; PERINEURAL at 15:30

## 2023-10-03 RX ADMIN — LIDOCAINE HYDROCHLORIDE 2 ML: 10 INJECTION, SOLUTION INFILTRATION; PERINEURAL at 15:30

## 2023-10-03 RX ADMIN — METHYLPREDNISOLONE ACETATE 2 ML: 40 INJECTION, SUSPENSION INTRA-ARTICULAR; INTRALESIONAL; INTRAMUSCULAR; SOFT TISSUE at 15:30

## 2023-10-03 NOTE — PROGRESS NOTES
Patient Name:  Janes Martinez  MRN:  [id number]    Assessment & Plan     1. Complete tear of right rotator cuff, unspecified whether traumatic  -     Large joint arthrocentesis: R subacromial bursa    2. Chronic right shoulder pain  -     Large joint arthrocentesis: R subacromial bursa      Right shoulder full thickness supraspinatus tear with worsening pain. · Reviewed and discussed the patient's Right shoulder MRI. Discussed the patient's diagnosis and associated treatment options including conservative and surgical treatment. Nonoperative management would include formal physical therapy and a physician directed home exercise program, activity modification, injection therapy, and oral analgesics. Risks of conservative management include:  Progression of tear size, persistent pain, increased weakness, increased tendon retraction, increased fatty infiltration, decreased potential for repair and healing in the future due to tear progression/retraction/fatty infiltration/increasing age, and future development of rotator cuff arthropathy. · Surgical intervention was also discussed in the form of Right shoulder arthroscopy with rotator cuff repair. Risks of surgery, including but not limited to, anesthesia complications, infection, damage to nerves and blood vessels, blood clots, postoperative stiffness, residual pain, inability to repair the torn tendon, failure of tendon healing, residual weakness, need for subsequent surgery, were discussed at length. Discussed intraoperative findings, post operative immobilization, outpatient PT, weightbearing status, return to active range of motion, return to full, unrestricted activity. · At this time, patient understanding of the above, but cannot move forward with surgery at this time because of his work schedule. He thinks January will be a better time for surgery and wished to move forward with CSI today.  Advised patient we would not perform surgery close to a CSI and patient understanding. Potential risks were discussed. Patient tolerated procedure well. Advised patient to continue ROM Exercises to prevent stiffness  · He will follow up in 2-3 months for reevaluation of Right shoulder and consideration of moving forward with surgery. History of the Present Illness   Flory Wilder is a 46 y.o. male with Right shoulder internal derangement. Today, patient reports persistent pain and weakness in the Right shoulder, especially with overhead lifting and motion. He locates pain to the lateral and anterior aspects of the shoulder. He admits to occasional nighttime awakenings. He will occasionally take OTC medications as needed for pain. He denies previous CSI. Review of Systems     Review of Systems   Constitutional: Negative for chills and fever. HENT: Negative for ear pain and sore throat. Eyes: Negative for pain and visual disturbance. Respiratory: Negative for cough and shortness of breath. Cardiovascular: Negative for chest pain and palpitations. Gastrointestinal: Negative for abdominal pain and vomiting. Genitourinary: Negative for dysuria and hematuria. Musculoskeletal: Negative for arthralgias and back pain. Skin: Negative for color change and rash. Neurological: Negative for seizures and syncope. All other systems reviewed and are negative. Physical Exam     /85   Pulse 85   Ht 5' 10" (1.778 m)   Wt 131 kg (289 lb)   BMI 41.47 kg/m²     Right Shoulder: Active range of motion   160 degrees forward flexion with discomfort  160 degrees abduction  60 degrees external rotation   1-2 level restriction internal rotation    There is tenderness present over the greater tuberosity of humerus. Supraspinatus testing 4/5 with discomfort   Infraspinatus testing 5/5  Subscapularis testing 4+/5  Cotter test is positive  Speed's test is Negative  The patient is neurovascularly intact distally in the extremity.       Data Review I have personally reviewed pertinent films in PACS, and my interpretation follows. X-rays taken 8/31/23 of Right shoulder demonstrate no fracture or dislocation. Joint spaces relatively well maintained. MRI performed 09/28/2023 of Right shoulder demonstrate full thickness supraspinatus tear at anterior leading edge. No retraction or atrophy noted.      Social History     Tobacco Use   • Smoking status: Former     Packs/day: 1.00     Years: 25.00     Total pack years: 25.00     Types: Cigarettes   • Smokeless tobacco: Never   Vaping Use   • Vaping Use: Never used   Substance Use Topics   • Alcohol use: Yes     Comment: weekends   • Drug use: Not Currently     Comment: Quit - As per [employer]            Large joint arthrocentesis: R subacromial bursa  Universal Protocol:  Risks and benefits: risks, benefits and alternatives were discussed  Consent given by: patient  Patient identity confirmed: verbally with patient    Supporting Documentation  Indications: pain   Procedure Details  Location: shoulder - R subacromial bursa  Needle size: 22 G  Approach: lateral  Medications administered: 2 mL bupivacaine 0.25 %; 2 mL lidocaine 1 %; 2 mL methylPREDNISolone acetate 40 mg/mL    Patient tolerance: patient tolerated the procedure well with no immediate complications  Dressing:  Sterile dressing applied            Otf Alicea DO

## 2023-11-06 ENCOUNTER — OFFICE VISIT (OUTPATIENT)
Dept: FAMILY MEDICINE CLINIC | Facility: CLINIC | Age: 48
End: 2023-11-06
Payer: COMMERCIAL

## 2023-11-06 ENCOUNTER — APPOINTMENT (OUTPATIENT)
Dept: LAB | Facility: CLINIC | Age: 48
End: 2023-11-06
Payer: COMMERCIAL

## 2023-11-06 ENCOUNTER — APPOINTMENT (OUTPATIENT)
Dept: RADIOLOGY | Facility: CLINIC | Age: 48
End: 2023-11-06
Payer: COMMERCIAL

## 2023-11-06 VITALS
TEMPERATURE: 98.9 F | SYSTOLIC BLOOD PRESSURE: 152 MMHG | WEIGHT: 293 LBS | OXYGEN SATURATION: 94 % | DIASTOLIC BLOOD PRESSURE: 84 MMHG | HEIGHT: 70 IN | BODY MASS INDEX: 41.95 KG/M2 | HEART RATE: 92 BPM

## 2023-11-06 DIAGNOSIS — M79.641 BILATERAL HAND PAIN: ICD-10-CM

## 2023-11-06 DIAGNOSIS — M79.642 BILATERAL HAND PAIN: ICD-10-CM

## 2023-11-06 DIAGNOSIS — G89.29 CHRONIC MIDLINE LOW BACK PAIN WITHOUT SCIATICA: Primary | ICD-10-CM

## 2023-11-06 DIAGNOSIS — L82.1 SEBORRHEIC KERATOSES: ICD-10-CM

## 2023-11-06 DIAGNOSIS — M54.50 CHRONIC MIDLINE LOW BACK PAIN WITHOUT SCIATICA: Primary | ICD-10-CM

## 2023-11-06 PROCEDURE — 73130 X-RAY EXAM OF HAND: CPT

## 2023-11-06 PROCEDURE — 86200 CCP ANTIBODY: CPT

## 2023-11-06 PROCEDURE — 86430 RHEUMATOID FACTOR TEST QUAL: CPT

## 2023-11-06 PROCEDURE — 86038 ANTINUCLEAR ANTIBODIES: CPT

## 2023-11-06 PROCEDURE — 36415 COLL VENOUS BLD VENIPUNCTURE: CPT

## 2023-11-06 PROCEDURE — 99214 OFFICE O/P EST MOD 30 MIN: CPT | Performed by: FAMILY MEDICINE

## 2023-11-06 RX ORDER — MELOXICAM 15 MG/1
15 TABLET ORAL DAILY
Qty: 30 TABLET | Refills: 1 | Status: SHIPPED | OUTPATIENT
Start: 2023-11-06

## 2023-11-06 RX ORDER — METHOCARBAMOL 500 MG/1
500 TABLET, FILM COATED ORAL
Qty: 30 TABLET | Refills: 1 | Status: SHIPPED | OUTPATIENT
Start: 2023-11-06

## 2023-11-06 NOTE — PROGRESS NOTES
Assessment/Plan:         Problem List Items Addressed This Visit        Musculoskeletal and Integument    Seborrheic keratoses     Discussed benign lesion - reassurance            Other    Chronic midline low back pain without sciatica - Primary     Start Mobic 15 mg daily, Robaxin prn  Heating pad  PT referral          Relevant Medications    meloxicam (MOBIC) 15 mg tablet    methocarbamol (ROBAXIN) 500 mg tablet    Other Relevant Orders    Ambulatory Referral to Physical Therapy    Bilateral hand pain     Will check labs, Xrays         Relevant Orders    XR hand 3+ vw left    XR hand 3+ vw right    Rheumatoid Arthritis Factor    Cyclic citrul peptide antibody, IgG    WILLIE Screen w/ Reflex to Titer/Pattern         Subjective:      Patient ID: Abiodun Weston is a 46 y.o. male. He has chronic back pain, states when he bends over he can't straighten up. Feels paralyzed. Some numbness in legs. Last MRI was in 2014 showed mild degenerative changes. No current treatment. Hands are stiff and cramp - left is worse than right. This started a month ago approximately. No current treatment. Wonders about RA. He also has a spot on the upper back, dark and seems to be growing     Back Pain  This is a recurrent problem. The current episode started 1 to 4 weeks ago. The problem occurs constantly. The problem is unchanged. The pain is present in the lumbar spine. The quality of the pain is described as shooting. The pain radiates to the right thigh. The pain is at a severity of 8/10. The pain is The same all the time. The symptoms are aggravated by bending and standing. Stiffness is present All day. Associated symptoms include leg pain, numbness and paresis. Pertinent negatives include no abdominal pain, bladder incontinence, bowel incontinence, chest pain, dysuria, fever, headaches, paresthesias, pelvic pain, perianal numbness, tingling, weakness or weight loss. Risk factors include obesity.    Hand Pain   Associated symptoms include numbness. Pertinent negatives include no chest pain or tingling. The following portions of the patient's history were reviewed and updated as appropriate:   Past Medical History:  He has a past medical history of Anxiety and depression, Colon polyp, Depression, Esophageal reflux, Fracture of both hands, Hair loss, Heartburn, Hiatal hernia, Night sweats, Sleep disturbance, Starting and stopping of urinary stream during micturition, Stomach problems, and Weight gain. ,  _______________________________________________________________________  Medical Problems:  does not have any pertinent problems on file.,  _______________________________________________________________________  Past Surgical History:   has a past surgical history that includes Appendectomy (1984); Colonoscopy; Esophagogastroduodenoscopy; Hand surgery; and Nose surgery. ,  _______________________________________________________________________  Family History:  family history includes Colon cancer in his maternal grandfather; Dementia in his paternal grandmother; No Known Problems in his mother; Pancreatic cancer in his maternal grandmother.,  _______________________________________________________________________  Social History:   reports that he has quit smoking. His smoking use included cigarettes. He has a 25.00 pack-year smoking history. He has never used smokeless tobacco. He reports current alcohol use. He reports that he does not currently use drugs. ,  _______________________________________________________________________  Allergies:  is allergic to tramadol and clindamycin. .  _______________________________________________________________________  Current Outpatient Medications   Medication Sig Dispense Refill   • meloxicam (MOBIC) 15 mg tablet Take 1 tablet (15 mg total) by mouth in the morning 30 tablet 1   • methocarbamol (ROBAXIN) 500 mg tablet Take 1 tablet (500 mg total) by mouth daily at bedtime as needed for muscle spasms 30 tablet 1   • pantoprazole (PROTONIX) 40 mg tablet Take 1 tablet (40 mg total) by mouth daily 90 tablet 4     No current facility-administered medications for this visit.     _______________________________________________________________________  Review of Systems   Constitutional:  Negative for fever and weight loss. Cardiovascular:  Negative for chest pain. Gastrointestinal:  Negative for abdominal pain and bowel incontinence. Genitourinary:  Negative for bladder incontinence, dysuria and pelvic pain. Musculoskeletal:  Positive for back pain. Skin:         lesion   Neurological:  Positive for numbness. Negative for tingling, weakness, headaches and paresthesias. Objective:  Vitals:    11/06/23 1354   BP: 152/84   Pulse: 92   Temp: 98.9 °F (37.2 °C)   SpO2: 94%   Weight: 133 kg (293 lb)   Height: 5' 10" (1.778 m)     Body mass index is 42.04 kg/m². Physical Exam  Vitals and nursing note reviewed. Constitutional:       General: He is not in acute distress. Appearance: He is well-developed. He is obese. He is not diaphoretic. HENT:      Head: Normocephalic and atraumatic. Pulmonary:      Effort: Pulmonary effort is normal. No respiratory distress. Musculoskeletal:      Right wrist: Normal.      Left wrist: Normal.      Right hand: Tenderness present. No swelling or deformity. Normal range of motion. Left hand: Deformity (left pinky - prior surgery) and tenderness present. No swelling. Normal range of motion. Cervical back: Normal range of motion. Lumbar back: Spasms and tenderness present. No swelling or deformity. Decreased range of motion. Negative right straight leg raise test and negative left straight leg raise test.   Skin:     Findings: Lesion present. Neurological:      Mental Status: He is alert. Psychiatric:         Behavior: Behavior normal.         Thought Content:  Thought content normal.         Judgment: Judgment normal.

## 2023-11-07 LAB
ANA SER QL IA: NEGATIVE
RHEUMATOID FACT SER QL LA: NEGATIVE

## 2023-11-08 LAB — CCP AB SER IA-ACNC: 0.5

## 2024-02-06 ENCOUNTER — OFFICE VISIT (OUTPATIENT)
Dept: OBGYN CLINIC | Facility: CLINIC | Age: 49
End: 2024-02-06
Payer: COMMERCIAL

## 2024-02-06 VITALS
HEART RATE: 86 BPM | BODY MASS INDEX: 41.95 KG/M2 | SYSTOLIC BLOOD PRESSURE: 136 MMHG | HEIGHT: 70 IN | WEIGHT: 293 LBS | DIASTOLIC BLOOD PRESSURE: 90 MMHG

## 2024-02-06 DIAGNOSIS — M25.511 RIGHT SHOULDER PAIN, UNSPECIFIED CHRONICITY: ICD-10-CM

## 2024-02-06 DIAGNOSIS — M75.121 COMPLETE TEAR OF RIGHT ROTATOR CUFF, UNSPECIFIED WHETHER TRAUMATIC: Primary | ICD-10-CM

## 2024-02-06 PROCEDURE — 20610 DRAIN/INJ JOINT/BURSA W/O US: CPT | Performed by: ORTHOPAEDIC SURGERY

## 2024-02-06 PROCEDURE — 99214 OFFICE O/P EST MOD 30 MIN: CPT | Performed by: ORTHOPAEDIC SURGERY

## 2024-02-06 RX ORDER — LIDOCAINE HYDROCHLORIDE 10 MG/ML
2 INJECTION, SOLUTION INFILTRATION; PERINEURAL
Status: COMPLETED | OUTPATIENT
Start: 2024-02-06 | End: 2024-02-06

## 2024-02-06 RX ORDER — METHYLPREDNISOLONE ACETATE 40 MG/ML
2 INJECTION, SUSPENSION INTRA-ARTICULAR; INTRALESIONAL; INTRAMUSCULAR; SOFT TISSUE
Status: COMPLETED | OUTPATIENT
Start: 2024-02-06 | End: 2024-02-06

## 2024-02-06 RX ORDER — BUPIVACAINE HYDROCHLORIDE 2.5 MG/ML
2 INJECTION, SOLUTION INFILTRATION; PERINEURAL
Status: COMPLETED | OUTPATIENT
Start: 2024-02-06 | End: 2024-02-06

## 2024-02-06 RX ADMIN — LIDOCAINE HYDROCHLORIDE 2 ML: 10 INJECTION, SOLUTION INFILTRATION; PERINEURAL at 15:45

## 2024-02-06 RX ADMIN — METHYLPREDNISOLONE ACETATE 2 ML: 40 INJECTION, SUSPENSION INTRA-ARTICULAR; INTRALESIONAL; INTRAMUSCULAR; SOFT TISSUE at 15:45

## 2024-02-06 RX ADMIN — BUPIVACAINE HYDROCHLORIDE 2 ML: 2.5 INJECTION, SOLUTION INFILTRATION; PERINEURAL at 15:45

## 2024-02-06 NOTE — PROGRESS NOTES
Patient Name:  Antonio Garrison  MRN:  [id number]    Assessment & Plan     1. Complete tear of right rotator cuff, unspecified whether traumatic  -     Ambulatory Referral to Physical Therapy; Future  -     Large joint arthrocentesis: R subacromial bursa    2. Right shoulder pain, unspecified chronicity  -     Large joint arthrocentesis: R subacromial bursa      Right shoulder rotator cuff tear with recently worsening pain  Again, reviewed and discussed the patient's Right shoulder MRI. Educated patient he may have progression of rotator cuff tear secondary to his falls and worsening symptoms.   Discussed the patient's diagnosis and associated treatment options including nonoperative vs surgical treatment.   Nonoperative management would include formal physical therapy and a physician directed home exercise program, activity modification, injection therapy, and oral analgesics. Risks of conservative management include:  Progression of tear size, persistent pain, increased weakness, increased tendon retraction, increased fatty infiltration, decreased potential for repair and healing in the future due to tear progression/retraction/fatty infiltration/increasing age, and future development of rotator cuff arthropathy.  Surgical intervention was also discussed in the form of Right shoulder arthroscopy with rotator cuff repair.  Risks of surgery, including but not limited to, anesthesia complications, infection, damage to nerves and blood vessels, blood clots, postoperative stiffness, residual pain, inability to repair the torn tendon, failure of tendon healing, residual weakness, need for subsequent surgery, were discussed at length.   Discussed intraoperative findings, post operative immobilization, outpatient PT, weightbearing status, return to active range of motion, return to full, unrestricted activity around 4-6 months.   At this time, patient still does not have the time to move forward with surgical intervention  "due to upcoming life events and inability to take off from work. Educated patient about postponing surgical intervention including risks of rotator cuff atrophy and worsening retraction, persistent pain, rotator cuff arthropathy with discussion of different surgical procedure by means of shoulder arthroplasty.   Provided patient with CSI and tolerated well. Would hold off on repeat injection if patient wished to have surgery in the late summer as frequent injections decrease rotator cuff tendon quality. Patient agreeable.   PT script placed today  Follow up June-July for surgery planning.     History of the Present Illness   Antonio Garrison is a 48 y.o. male with Right shoulder rotator cuff tear s/p CSI on 10/03/2023. Today, patient reports he had two falls recently which worsened his pain, but does not know if his weakness worsened. Patients first injury was about 2 months ago as he grabbed onto something before he fell, and most recently, fell onto the Right shoulder. Patient is a  and delivers food; he is currently training new employees and would be able to decrease lifting at work. He states he does not have the time to move forward with surgery as he has a wedding to pay for and cannot afford to miss work.         Review of Systems     Review of Systems   Constitutional:  Negative for chills and fever.   HENT:  Negative for ear pain and sore throat.    Eyes:  Negative for pain and visual disturbance.   Respiratory:  Negative for cough and shortness of breath.    Cardiovascular:  Negative for chest pain and palpitations.   Gastrointestinal:  Negative for abdominal pain and vomiting.   Genitourinary:  Negative for dysuria and hematuria.   Musculoskeletal:  Negative for arthralgias and back pain.   Skin:  Negative for color change and rash.   Neurological:  Negative for seizures and syncope.   All other systems reviewed and are negative.      Physical Exam     /90   Pulse 86   Ht 5' 10\" " (1.778 m)   Wt 133 kg (293 lb)   BMI 42.04 kg/m²     Right Shoulder:   Active range of motion   130 degrees forward flexion with pain  80 degrees abduction with pain  40-50 degrees external rotation   2 level restriction internal rotation    There is no tenderness present over the shoulder.   Supraspinatus testing 4/5  Infraspinatus testing 4/5  Subscapularis testing 5/5  The patient is neurovascularly intact distally in the extremity.      Data Review     I have personally reviewed pertinent films in PACS, and my interpretation follows.    X-rays taken 8/31/23 of Right shoulder demonstrate no fracture or dislocation. Joint spaces relatively well maintained.      MRI performed 09/28/2023 of Right shoulder demonstrate full thickness supraspinatus tear at anterior leading edge. No retraction or atrophy noted.     Social History     Tobacco Use    Smoking status: Former     Current packs/day: 1.00     Average packs/day: 1 pack/day for 25.0 years (25.0 ttl pk-yrs)     Types: Cigarettes    Smokeless tobacco: Never   Vaping Use    Vaping status: Never Used   Substance Use Topics    Alcohol use: Yes     Comment: weekends    Drug use: Not Currently     Comment: Quit - As per [employer]            Large joint arthrocentesis: R subacromial bursa  Universal Protocol:  Risks and benefits: risks, benefits and alternatives were discussed  Consent given by: patient  Patient identity confirmed: verbally with patient  Supporting Documentation  Indications: pain   Procedure Details  Location: shoulder - R subacromial bursa  Needle size: 22 G  Approach: lateral  Medications administered: 2 mL bupivacaine 0.25 %; 2 mL lidocaine 1 %; 2 mL methylPREDNISolone acetate 40 mg/mL    Patient tolerance: patient tolerated the procedure well with no immediate complications  Dressing:  Sterile dressing applied            Randal Veronica DO

## 2024-05-29 ENCOUNTER — OFFICE VISIT (OUTPATIENT)
Dept: FAMILY MEDICINE CLINIC | Facility: CLINIC | Age: 49
End: 2024-05-29
Payer: COMMERCIAL

## 2024-05-29 VITALS
BODY MASS INDEX: 41.37 KG/M2 | OXYGEN SATURATION: 95 % | HEIGHT: 70 IN | TEMPERATURE: 98.3 F | HEART RATE: 87 BPM | WEIGHT: 289 LBS | SYSTOLIC BLOOD PRESSURE: 134 MMHG | DIASTOLIC BLOOD PRESSURE: 78 MMHG

## 2024-05-29 DIAGNOSIS — M25.40 JOINT SWELLING: Primary | ICD-10-CM

## 2024-05-29 DIAGNOSIS — R09.82 POST-NASAL DISCHARGE: ICD-10-CM

## 2024-05-29 DIAGNOSIS — K52.9 CHRONIC DIARRHEA: ICD-10-CM

## 2024-05-29 DIAGNOSIS — H69.91 DISORDER OF RIGHT EUSTACHIAN TUBE: ICD-10-CM

## 2024-05-29 DIAGNOSIS — J30.89 ALLERGIC RHINITIS DUE TO OTHER ALLERGIC TRIGGER, UNSPECIFIED SEASONALITY: ICD-10-CM

## 2024-05-29 DIAGNOSIS — M25.476 SWELLING OF FOOT JOINT: ICD-10-CM

## 2024-05-29 PROBLEM — J30.9 ALLERGIC RHINITIS: Status: ACTIVE | Noted: 2024-05-29

## 2024-05-29 PROCEDURE — 99214 OFFICE O/P EST MOD 30 MIN: CPT | Performed by: FAMILY MEDICINE

## 2024-05-29 RX ORDER — MELOXICAM 15 MG/1
15 TABLET ORAL DAILY
Qty: 30 TABLET | Refills: 1 | Status: SHIPPED | OUTPATIENT
Start: 2024-05-29

## 2024-05-29 RX ORDER — FLUTICASONE PROPIONATE 50 MCG
1 SPRAY, SUSPENSION (ML) NASAL DAILY
Qty: 16 G | Refills: 2 | Status: SHIPPED | OUTPATIENT
Start: 2024-05-29

## 2024-05-29 RX ORDER — CETIRIZINE HYDROCHLORIDE 10 MG/1
10 TABLET ORAL DAILY
Qty: 30 TABLET | Refills: 2 | Status: SHIPPED | OUTPATIENT
Start: 2024-05-29

## 2024-05-29 NOTE — PROGRESS NOTES
Assessment/Plan:         Problem List Items Addressed This Visit        Respiratory    Allergic rhinitis     Flonase & Zyrtec         Relevant Medications    meloxicam (MOBIC) 15 mg tablet    fluticasone (FLONASE) 50 mcg/act nasal spray    cetirizine (ZyrTEC) 10 mg tablet       Digestive    Chronic diarrhea     Unclear etiology   Requesting celiac testing - will order  Advised follow-up w/ GI         Relevant Orders    Celiac Disease Panel       Nervous and Auditory    Disorder of right eustachian tube     Flonase daily             Orthopedic/Musculoskeletal    Joint swelling - Primary     Will check uric acid and Xray L foot         Relevant Medications    meloxicam (MOBIC) 15 mg tablet    Other Relevant Orders    Uric acid    XR foot 3+ vw left       Other    Post-nasal discharge     Flonase and Zyrtec              Subjective:      Patient ID: Antonio Garrison is a 49 y.o. male.    49 year old here for left foot swelling he says he twisted it about 2 weeks ago and the swelling is not improving. Thinks the swelling started prior to the injury. He also reports left hand swelling for a long time. He thinks the swelling is worse when he drinks alcohol.  Had Xrays of b/l hands in November and rheum labs were all normal.     He feels post nasal drip and swelling R side of neck, seems worse since spring time/allergy season.     Also has chronic diarrhea and wife wants him to be tested for celiac disease.  He did have a colonoscopy about a year ago did not reveal any abnormalities.  He was supposed to take colestyramine but states that the prior Auth was never done and he was lost to follow-up with GI.            The following portions of the patient's history were reviewed and updated as appropriate:   Past Medical History:  He has a past medical history of Anxiety and depression, Colon polyp, Depression, Esophageal reflux, Fracture of both hands, Hair loss, Heartburn, Hiatal hernia, Night sweats, Sleep disturbance,  Starting and stopping of urinary stream during micturition, Stomach problems, and Weight gain.,  _______________________________________________________________________  Medical Problems:  does not have any pertinent problems on file.,  _______________________________________________________________________  Past Surgical History:   has a past surgical history that includes Appendectomy (1984); Colonoscopy; Esophagogastroduodenoscopy; Hand surgery; and Nose surgery.,  _______________________________________________________________________  Family History:  family history includes Colon cancer in his maternal grandfather; Dementia in his paternal grandmother; No Known Problems in his mother; Pancreatic cancer in his maternal grandmother.,  _______________________________________________________________________  Social History:   reports that he has quit smoking. His smoking use included cigarettes. He has a 25 pack-year smoking history. He has never used smokeless tobacco. He reports current alcohol use. He reports that he does not currently use drugs.,  _______________________________________________________________________  Allergies:  is allergic to tramadol and clindamycin..  _______________________________________________________________________  Current Outpatient Medications   Medication Sig Dispense Refill   • cetirizine (ZyrTEC) 10 mg tablet Take 1 tablet (10 mg total) by mouth daily 30 tablet 2   • fluticasone (FLONASE) 50 mcg/act nasal spray 1 spray into each nostril daily 16 g 2   • meloxicam (MOBIC) 15 mg tablet Take 1 tablet (15 mg total) by mouth in the morning 30 tablet 1   • pantoprazole (PROTONIX) 40 mg tablet Take 1 tablet (40 mg total) by mouth daily 90 tablet 4     No current facility-administered medications for this visit.     _______________________________________________________________________  Review of Systems   HENT:  Positive for ear pain and postnasal drip. Negative for congestion.   "  Musculoskeletal:  Positive for joint swelling.   Allergic/Immunologic: Positive for environmental allergies.         Objective:  Vitals:    05/29/24 1552   BP: 134/78   Pulse: 87   Temp: 98.3 °F (36.8 °C)   SpO2: 95%   Weight: 131 kg (289 lb)   Height: 5' 10\" (1.778 m)     Body mass index is 41.47 kg/m².     Physical Exam  Vitals and nursing note reviewed.   Constitutional:       General: He is not in acute distress.     Appearance: He is well-developed. He is obese.   HENT:      Right Ear: Hearing, tympanic membrane, ear canal and external ear normal.      Left Ear: Hearing, tympanic membrane, ear canal and external ear normal.      Mouth/Throat:      Pharynx: Uvula midline. No oropharyngeal exudate or posterior oropharyngeal erythema.   Eyes:      Conjunctiva/sclera: Conjunctivae normal.      Pupils: Pupils are equal, round, and reactive to light.   Cardiovascular:      Rate and Rhythm: Normal rate.      Heart sounds: Normal heart sounds. No murmur heard.     No friction rub. No gallop.   Pulmonary:      Effort: Pulmonary effort is normal. No respiratory distress.      Breath sounds: Normal breath sounds. No wheezing or rales.   Musculoskeletal:      Right hand: Normal.      Left hand: Normal.      Right lower leg: Edema present.      Left lower leg: Edema present.      Left foot: Normal.   Lymphadenopathy:      Cervical: No cervical adenopathy.   Skin:     General: Skin is warm.      Findings: No rash.   Neurological:      Mental Status: He is alert and oriented to person, place, and time.   Psychiatric:         Behavior: Behavior normal.         Thought Content: Thought content normal.         Judgment: Judgment normal.         "

## 2024-05-30 ENCOUNTER — APPOINTMENT (OUTPATIENT)
Dept: LAB | Facility: CLINIC | Age: 49
End: 2024-05-30
Payer: COMMERCIAL

## 2024-05-30 DIAGNOSIS — M25.40 JOINT SWELLING: ICD-10-CM

## 2024-05-30 DIAGNOSIS — K52.9 CHRONIC DIARRHEA: ICD-10-CM

## 2024-05-30 PROCEDURE — 82784 ASSAY IGA/IGD/IGG/IGM EACH: CPT

## 2024-05-30 PROCEDURE — 86364 TISS TRNSGLTMNASE EA IG CLAS: CPT

## 2024-05-30 PROCEDURE — 86258 DGP ANTIBODY EACH IG CLASS: CPT

## 2024-05-30 PROCEDURE — 36415 COLL VENOUS BLD VENIPUNCTURE: CPT

## 2024-05-30 PROCEDURE — 84550 ASSAY OF BLOOD/URIC ACID: CPT

## 2024-05-31 LAB
GLIADIN PEPTIDE IGA SER-ACNC: 1.8 U/ML
GLIADIN PEPTIDE IGA SER-ACNC: NEGATIVE
GLIADIN PEPTIDE IGG SER-ACNC: <0.4 U/ML
GLIADIN PEPTIDE IGG SER-ACNC: NEGATIVE
IGA SERPL-MCNC: 74 MG/DL (ref 66–433)
TTG IGA SER-ACNC: <0.5 U/ML
TTG IGA SER-ACNC: NEGATIVE
TTG IGG SER-ACNC: <0.8 U/ML
TTG IGG SER-ACNC: NEGATIVE
URATE SERPL-MCNC: 7.2 MG/DL (ref 3.5–8.5)

## 2024-06-11 ENCOUNTER — OFFICE VISIT (OUTPATIENT)
Dept: OBGYN CLINIC | Facility: CLINIC | Age: 49
End: 2024-06-11
Payer: COMMERCIAL

## 2024-06-11 VITALS
HEIGHT: 70 IN | HEART RATE: 81 BPM | SYSTOLIC BLOOD PRESSURE: 122 MMHG | WEIGHT: 289 LBS | DIASTOLIC BLOOD PRESSURE: 82 MMHG | BODY MASS INDEX: 41.37 KG/M2

## 2024-06-11 DIAGNOSIS — G89.29 CHRONIC RIGHT SHOULDER PAIN: ICD-10-CM

## 2024-06-11 DIAGNOSIS — M75.121 COMPLETE TEAR OF RIGHT ROTATOR CUFF, UNSPECIFIED WHETHER TRAUMATIC: Primary | ICD-10-CM

## 2024-06-11 DIAGNOSIS — M25.511 CHRONIC RIGHT SHOULDER PAIN: ICD-10-CM

## 2024-06-11 PROCEDURE — 99213 OFFICE O/P EST LOW 20 MIN: CPT | Performed by: ORTHOPAEDIC SURGERY

## 2024-06-11 NOTE — PROGRESS NOTES
Patient Name:  Antonio Garrison  MRN:  [id number]    Assessment & Plan     1. Complete tear of right rotator cuff, unspecified whether traumatic  2. Chronic right shoulder pain      Right shoulder rotator cuff tear with improved pain from last visit  Again, reviewed and discussed the patient's Right shoulder MRI.   Discussed the patient's diagnosis and associated treatment options including nonoperative vs surgical treatment.   Nonoperative management would include formal physical therapy and a physician directed home exercise program, activity modification, injection therapy, and oral analgesics. Risks of conservative management include:  Progression of tear size, persistent pain, increased weakness, increased tendon retraction, increased fatty infiltration, decreased potential for repair and healing in the future due to tear progression/retraction/fatty infiltration/increasing age, and future development of rotator cuff arthropathy.  Surgical intervention was also discussed in the form of Right shoulder arthroscopy with rotator cuff repair.  Risks of surgery, including but not limited to, anesthesia complications, infection, damage to nerves and blood vessels, blood clots, postoperative stiffness, residual pain, inability to repair the torn tendon, failure of tendon healing, residual weakness, need for subsequent surgery, were discussed at length.     At this time, patient displays improved pain and does not want to move forward with surgical intervention. Educated patient about postponing surgical intervention including risks of rotator cuff atrophy and worsening retraction, persistent pain, rotator cuff arthropathy with discussion of different surgical procedure by means of shoulder arthroplasty.   The patient will follow up on an as needed basis    History of the Present Illness   Antonio Garrison is a 49 y.o. male with Right shoulder rotator cuff tear s/p CSI on 2/6/2024. The patient admits his right shoulder  "is doing well today. His pain is well controlled and he has been able to work without increase in symptoms. He would like to hold off on operative management at this time. Today, he noticed some soreness in his left shoulder without injury. He is also feeling some popping in his left shoulder. Pain is not keeping him up at night.        Review of Systems     Review of Systems   Constitutional:  Negative for chills and fever.   HENT:  Negative for ear pain and sore throat.    Eyes:  Negative for pain and visual disturbance.   Respiratory:  Negative for cough and shortness of breath.    Cardiovascular:  Negative for chest pain and palpitations.   Gastrointestinal:  Negative for abdominal pain and vomiting.   Genitourinary:  Negative for dysuria and hematuria.   Musculoskeletal:  Negative for arthralgias and back pain.   Skin:  Negative for color change and rash.   Neurological:  Negative for seizures and syncope.   All other systems reviewed and are negative.      Physical Exam     /82   Pulse 81   Ht 5' 10\" (1.778 m)   Wt 131 kg (289 lb)   BMI 41.47 kg/m²     Right Shoulder:   Active range of motion   170 degrees forward flexion with pain  170 degrees abduction with pain  80 degrees external rotation   4 level restriction internal rotation    There is no tenderness present over the shoulder.   Supraspinatus testing 4/5  Infraspinatus testing 5/5  Subscapularis testing 5/5  Speed's test is positive  The patient is neurovascularly intact distally in the extremity.      Data Review     I have personally reviewed pertinent films in PACS, and my interpretation follows.    X-rays taken 8/31/23 of Right shoulder demonstrate no fracture or dislocation. Joint spaces relatively well maintained.      MRI performed 09/28/2023 of Right shoulder demonstrate full thickness supraspinatus tear at anterior leading edge. No retraction or atrophy noted.     Social History     Tobacco Use    Smoking status: Former     Current " packs/day: 1.00     Average packs/day: 1 pack/day for 25.0 years (25.0 ttl pk-yrs)     Types: Cigarettes    Smokeless tobacco: Never   Vaping Use    Vaping status: Never Used   Substance Use Topics    Alcohol use: Yes     Comment: weekends    Drug use: Not Currently     Comment: Quit - As per [employer]            Procedures  None.    Randal Veronica DO   Scribe Attestation      I,:  Svetlana Bassett am acting as a scribe while in the presence of the attending physician.:       I,:  Randal Veronica DO personally performed the services described in this documentation    as scribed in my presence.:

## 2024-07-12 ENCOUNTER — APPOINTMENT (EMERGENCY)
Dept: CT IMAGING | Facility: HOSPITAL | Age: 49
End: 2024-07-12
Payer: COMMERCIAL

## 2024-07-12 ENCOUNTER — HOSPITAL ENCOUNTER (EMERGENCY)
Facility: HOSPITAL | Age: 49
Discharge: HOME | End: 2024-07-12
Attending: EMERGENCY MEDICINE
Payer: COMMERCIAL

## 2024-07-12 VITALS
TEMPERATURE: 98.3 F | SYSTOLIC BLOOD PRESSURE: 129 MMHG | RESPIRATION RATE: 18 BRPM | BODY MASS INDEX: 40.4 KG/M2 | HEART RATE: 81 BPM | DIASTOLIC BLOOD PRESSURE: 77 MMHG | OXYGEN SATURATION: 95 % | WEIGHT: 282.19 LBS | HEIGHT: 70 IN

## 2024-07-12 DIAGNOSIS — M54.9 BACK PAIN: ICD-10-CM

## 2024-07-12 DIAGNOSIS — R10.9 NONSPECIFIC ABDOMINAL PAIN: Primary | ICD-10-CM

## 2024-07-12 LAB
ANION GAP SERPL CALCULATED.3IONS-SCNC: 7 MMOL/L (ref 4–13)
BASOPHILS # BLD AUTO: 0.05 THOUSANDS/ÂΜL (ref 0–0.1)
BASOPHILS NFR BLD AUTO: 1 % (ref 0–1)
BUN SERPL-MCNC: 18 MG/DL (ref 5–25)
CALCIUM SERPL-MCNC: 10.1 MG/DL (ref 8.4–10.2)
CHLORIDE SERPL-SCNC: 108 MMOL/L (ref 96–108)
CO2 SERPL-SCNC: 24 MMOL/L (ref 21–32)
CREAT SERPL-MCNC: 1.03 MG/DL (ref 0.6–1.3)
EOSINOPHIL # BLD AUTO: 0.31 THOUSAND/ÂΜL (ref 0–0.61)
EOSINOPHIL NFR BLD AUTO: 4 % (ref 0–6)
ERYTHROCYTE [DISTWIDTH] IN BLOOD BY AUTOMATED COUNT: 12.5 % (ref 11.6–15.1)
GFR SERPL CREATININE-BSD FRML MDRD: 83 ML/MIN/1.73SQ M
GLUCOSE SERPL-MCNC: 112 MG/DL (ref 65–140)
HCT VFR BLD AUTO: 45.3 % (ref 36.5–49.3)
HGB BLD-MCNC: 15.3 G/DL (ref 12–17)
IMM GRANULOCYTES # BLD AUTO: 0.03 THOUSAND/UL (ref 0–0.2)
IMM GRANULOCYTES NFR BLD AUTO: 0 % (ref 0–2)
LYMPHOCYTES # BLD AUTO: 2.21 THOUSANDS/ÂΜL (ref 0.6–4.47)
LYMPHOCYTES NFR BLD AUTO: 25 % (ref 14–44)
MCH RBC QN AUTO: 29.9 PG (ref 26.8–34.3)
MCHC RBC AUTO-ENTMCNC: 33.8 G/DL (ref 31.4–37.4)
MCV RBC AUTO: 89 FL (ref 82–98)
MONOCYTES # BLD AUTO: 0.53 THOUSAND/ÂΜL (ref 0.17–1.22)
MONOCYTES NFR BLD AUTO: 6 % (ref 4–12)
NEUTROPHILS # BLD AUTO: 5.77 THOUSANDS/ÂΜL (ref 1.85–7.62)
NEUTS SEG NFR BLD AUTO: 64 % (ref 43–75)
NRBC BLD AUTO-RTO: 0 /100 WBCS
PLATELET # BLD AUTO: 226 THOUSANDS/UL (ref 149–390)
PMV BLD AUTO: 10.6 FL (ref 8.9–12.7)
POTASSIUM SERPL-SCNC: 3.6 MMOL/L (ref 3.5–5.3)
RBC # BLD AUTO: 5.12 MILLION/UL (ref 3.88–5.62)
SODIUM SERPL-SCNC: 139 MMOL/L (ref 135–147)
WBC # BLD AUTO: 8.9 THOUSAND/UL (ref 4.31–10.16)

## 2024-07-12 PROCEDURE — 80048 BASIC METABOLIC PNL TOTAL CA: CPT | Performed by: EMERGENCY MEDICINE

## 2024-07-12 PROCEDURE — 99284 EMERGENCY DEPT VISIT MOD MDM: CPT

## 2024-07-12 PROCEDURE — 36415 COLL VENOUS BLD VENIPUNCTURE: CPT | Performed by: EMERGENCY MEDICINE

## 2024-07-12 PROCEDURE — 74177 CT ABD & PELVIS W/CONTRAST: CPT

## 2024-07-12 PROCEDURE — 99284 EMERGENCY DEPT VISIT MOD MDM: CPT | Performed by: EMERGENCY MEDICINE

## 2024-07-12 PROCEDURE — 85025 COMPLETE CBC W/AUTO DIFF WBC: CPT | Performed by: EMERGENCY MEDICINE

## 2024-07-12 RX ADMIN — IOHEXOL 100 ML: 350 INJECTION, SOLUTION INTRAVENOUS at 15:06

## 2024-07-12 NOTE — DISCHARGE INSTRUCTIONS
Normal care  Rest  Heat to your back  Salonpas patches may help that area  Follow-up with your provider for ongoing abdominal pain  [facility] with the [facility] for your [facility] pain

## 2024-07-12 NOTE — ED PROVIDER NOTES
History  Chief Complaint   Patient presents with    Back Pain     C/o lower back pain since last Wednesday. Denies injuries.      HPI patient is a 49-year-old male he reported to triage that he has low back pain when I discussed with the patient he reports that he has back pain primarily points to both of his flanks and then points to his anterior abdomen he reports that the pain comes around into the front of his abdomen and he has abdominal pain.  Patient feels he might have a kidney stone some other problem in his abdomen.  Denies any vomiting or diarrhea.  Denies any heavy lifting.  He does report pain with movement and with bending over.  Denies any pain rating down his legs.  Past medical history of fractures of his hands, heartburn, esophageal reflux,  Family history noncontributory  Social history, employed, former smoker    Prior to Admission Medications   Prescriptions Last Dose Informant Patient Reported? Taking?   cetirizine (ZyrTEC) 10 mg tablet  Self No No   Sig: Take 1 tablet (10 mg total) by mouth daily   fluticasone (FLONASE) 50 mcg/act nasal spray  Self No No   Si spray into each nostril daily   meloxicam (MOBIC) 15 mg tablet  Self No No   Sig: Take 1 tablet (15 mg total) by mouth in the morning   pantoprazole (PROTONIX) 40 mg tablet  Self No No   Sig: Take 1 tablet (40 mg total) by mouth daily      Facility-Administered Medications: None       Past Medical History:   Diagnosis Date    Anxiety and depression     Colon polyp     Depression     Esophageal reflux     Fracture of both hands     Hair loss     Heartburn     Hiatal hernia     Resolved 2015     Night sweats     Sleep disturbance     Starting and stopping of urinary stream during micturition     Stomach problems     Weight gain        Past Surgical History:   Procedure Laterality Date    APPENDECTOMY      COLONOSCOPY      ESOPHAGOGASTRODUODENOSCOPY      Diagnostic     HAND SURGERY      Right in  and left in      NOSE  SURGERY      Nasal septal deviation repair        Family History   Problem Relation Age of Onset    No Known Problems Mother     Pancreatic cancer Maternal Grandmother     Colon cancer Maternal Grandfather     Dementia Paternal Grandmother      I have reviewed and agree with the history as documented.    E-Cigarette/Vaping    E-Cigarette Use Never User      E-Cigarette/Vaping Substances    Nicotine No     THC No     CBD No     Flavoring No     Other No     Unknown No      Social History     Tobacco Use    Smoking status: Former     Current packs/day: 1.00     Average packs/day: 1 pack/day for 25.0 years (25.0 ttl pk-yrs)     Types: Cigarettes    Smokeless tobacco: Never   Vaping Use    Vaping status: Never Used   Substance Use Topics    Alcohol use: Yes     Comment: weekends    Drug use: Not Currently     Comment: Quit - As per [employer]        Review of Systems   Constitutional:  Negative for fever.   HENT:  Negative for congestion.    Eyes:  Negative for pain and redness.   Respiratory:  Negative for cough and shortness of breath.    Cardiovascular:  Negative for chest pain.   Gastrointestinal:  Positive for abdominal pain. Negative for diarrhea, nausea and vomiting.   Musculoskeletal:  Positive for back pain.       Physical Exam  Physical Exam  Vitals and nursing note reviewed.   Constitutional:       Appearance: He is well-developed.   HENT:      Head: Normocephalic.      Right Ear: External ear normal.      Left Ear: External ear normal.      Nose: Nose normal.      Mouth/Throat:      Mouth: Mucous membranes are moist.      Pharynx: Oropharynx is clear.   Eyes:      General: Lids are normal.      Extraocular Movements: Extraocular movements intact.      Pupils: Pupils are equal, round, and reactive to light.   Cardiovascular:      Rate and Rhythm: Normal rate and regular rhythm.      Pulses: Normal pulses.      Heart sounds: Normal heart sounds.   Pulmonary:      Effort: Pulmonary effort is normal. No  respiratory distress.   Abdominal:      General: Abdomen is flat. Bowel sounds are normal.      Tenderness: There is abdominal tenderness.      Comments: There is mild diffuse anterior abdominal tenderness   Musculoskeletal:         General: No deformity. Normal range of motion.      Cervical back: Normal range of motion and neck supple.      Comments: There is bilateral low back tenderness pain with movement and with bending.   Skin:     General: Skin is warm and dry.   Neurological:      Mental Status: He is alert and oriented to person, place, and time.   Psychiatric:         Mood and Affect: Mood normal.         Vital Signs  ED Triage Vitals [07/12/24 1249]   Temperature Pulse Respirations Blood Pressure SpO2   98.3 °F (36.8 °C) 81 18 129/77 95 %      Temp Source Heart Rate Source Patient Position - Orthostatic VS BP Location FiO2 (%)   Oral Monitor Sitting Left arm --      Pain Score       --           Vitals:    07/12/24 1249   BP: 129/77   Pulse: 81   Patient Position - Orthostatic VS: Sitting         Visual Acuity      ED Medications  Medications   iohexol (OMNIPAQUE) 350 MG/ML injection (MULTI-DOSE) 100 mL (100 mL Intravenous Given 7/12/24 1506)       Diagnostic Studies  Results Reviewed       Procedure Component Value Units Date/Time    Basic metabolic panel [[id number]] Collected: 07/12/24 1352    Lab Status: Final result Specimen: Blood from Arm, Left Updated: 07/12/24 1411     Sodium 139 mmol/L      Potassium 3.6 mmol/L      Chloride 108 mmol/L      CO2 24 mmol/L      ANION GAP 7 mmol/L      BUN 18 mg/dL      Creatinine 1.03 mg/dL      Glucose 112 mg/dL      Calcium 10.1 mg/dL      eGFR 83 ml/min/1.73sq m     Narrative:      National Kid[employer] guidelines for Chronic Kidney Disease (CKD):     Stage 1 with normal or high GFR (GFR > 90 mL/min/1.73 square meters)    Stage 2 Mild CKD (GFR = 60-89 mL/min/1.73 square meters)    Stage 3A Moderate CKD (GFR = 45-59 mL/min/1.73 square meters)     Stage 3B Moderate CKD (GFR = 30-44 mL/min/1.73 square meters)    Stage 4 Severe CKD (GFR = 15-29 mL/min/1.73 square meters)    Stage 5 End Stage CKD (GFR <15 mL/min/1.73 square meters)  Note: GFR calculation is accurate only with a steady state creatinine    CBC and differential [[id number]] Collected: 07/12/24 1352    Lab Status: Final result Specimen: Blood from Arm, Left Updated: 07/12/24 1356     WBC 8.90 Thousand/uL      RBC 5.12 Million/uL      Hemoglobin 15.3 g/dL      Hematocrit 45.3 %      MCV 89 fL      MCH 29.9 pg      MCHC 33.8 g/dL      RDW 12.5 %      MPV 10.6 fL      Platelets 226 Thousands/uL      nRBC 0 /100 WBCs      Segmented % 64 %      Immature Grans % 0 %      Lymphocytes % 25 %      Monocytes % 6 %      Eosinophils Relative 4 %      Basophils Relative 1 %      Absolute Neutrophils 5.77 Thousands/µL      Absolute Immature Grans 0.03 Thousand/uL      Absolute Lymphocytes 2.21 Thousands/µL      Absolute Monocytes 0.53 Thousand/µL      Eosinophils Absolute 0.31 Thousand/µL      Basophils Absolute 0.05 Thousands/µL                    CT abdomen pelvis with contrast   Final Result by Jennifer oCrbin MD (07/12 1535)      Nonobstructing left renal calculi with largest measuring about 9 mm in the upper pole of the left kidney      No hydronephrosis      Hepatic steatosis      Workstation performed: [id number]                    Procedures  Procedures         ED Course     Diagnostic workup  Electrolytes within normal limits  White count was 8.9 no sign of inflammation hemoglobin was 15 no sign of anemia.  CT scan showed a nonobstructing left renal calculi in the upper pole of the kidney.  There was no hydronephrosis.  Discussed with patient gave him a copy of the report.  No bony abnormalities.                                          Medical Decision Making  Medical decision making 49-year-old male presents emergency department with low back pain worse with bending and moving but also reports  abdominal pain patient was concerned he believes he had a kidney stone.  CT showed a kidney stone but nonobstructing in the renal pelvis.  Discussed with patient.  Discussed low back pain and the need to follow-up with the [facility].  Discussed indications to return.  Advised to follow-up with his primary care doctor about the nonobstructing kidney stone.  He cannot be having symptoms from kidney stone in the renal pelvis.    Amount and/or Complexity of Data Reviewed  Labs: ordered.  Radiology: ordered.    Risk  Prescription drug management.                 Disposition  Final diagnoses:   Nonspecific abdominal pain   Back pain     Time reflects when diagnosis was documented in both MDM as applicable and the Disposition within this note       Time User Action Codes Description Comment    7/12/2024  4:19 PM Isaac Xavier [R10.9] Nonspecific abdominal pain     7/12/2024  4:19 PM Isaac Xavier [M54.9] Back pain           ED Disposition       ED Disposition   Discharge    Condition   Stable    Date/Time   Fri Jul 12, 2024  4:19 PM    Comment   Antonio Garrison discharge to home/self care.                   Follow-up Information    None         Discharge Medication List as of 7/12/2024  4:21 PM        CONTINUE these medications which have NOT CHANGED    Details   cetirizine (ZyrTEC) 10 mg tablet Take 1 tablet (10 mg total) by mouth daily, Starting Wed 5/29/2024, Normal      fluticasone (FLONASE) 50 mcg/act nasal spray 1 spray into each nostril daily, Starting Wed 5/29/2024, Normal      meloxicam (MOBIC) 15 mg tablet Take 1 tablet (15 mg total) by mouth in the morning, Starting Wed 5/29/2024, Normal      pantoprazole (PROTONIX) 40 mg tablet Take 1 tablet (40 mg total) by mouth daily, Starting Mon 8/14/2023, Normal                 PDMP Review       None            ED Provider  Electronically Signed by             Isaac Xavier MD  07/12/24 1223

## 2024-07-15 ENCOUNTER — TELEPHONE (OUTPATIENT)
Dept: PHYSICAL THERAPY | Facility: OTHER | Age: 49
End: 2024-07-15

## 2024-07-15 NOTE — TELEPHONE ENCOUNTER
Call placed to the patient per [facility] referral.    Spoke with the patient who declined triage for PT re: back pain. States he is dealing with kidney stones at the moment and has to get that figured out first.     Pt will call [facility] back if needed    closed

## 2024-08-08 ENCOUNTER — OFFICE VISIT (OUTPATIENT)
Dept: PAIN MEDICINE | Facility: CLINIC | Age: 49
End: 2024-08-08
Payer: COMMERCIAL

## 2024-08-08 VITALS
BODY MASS INDEX: 40.66 KG/M2 | SYSTOLIC BLOOD PRESSURE: 124 MMHG | RESPIRATION RATE: 18 BRPM | HEIGHT: 70 IN | WEIGHT: 284 LBS | DIASTOLIC BLOOD PRESSURE: 80 MMHG | HEART RATE: 86 BPM

## 2024-08-08 DIAGNOSIS — G89.29 CHRONIC MIDLINE LOW BACK PAIN WITH SCIATICA, SCIATICA LATERALITY UNSPECIFIED: Primary | ICD-10-CM

## 2024-08-08 DIAGNOSIS — R93.7 ABNORMAL MRI, CERVICAL SPINE: ICD-10-CM

## 2024-08-08 DIAGNOSIS — R93.7 ABNORMAL MRI, THORACIC SPINE: ICD-10-CM

## 2024-08-08 DIAGNOSIS — G95.0 SYRINGOMYELIA (HCC): ICD-10-CM

## 2024-08-08 DIAGNOSIS — M54.40 CHRONIC MIDLINE LOW BACK PAIN WITH SCIATICA, SCIATICA LATERALITY UNSPECIFIED: Primary | ICD-10-CM

## 2024-08-08 DIAGNOSIS — G95.0 SYRINX OF SPINAL CORD (HCC): ICD-10-CM

## 2024-08-08 PROCEDURE — 99204 OFFICE O/P NEW MOD 45 MIN: CPT | Performed by: PHYSICAL MEDICINE & REHABILITATION

## 2024-08-08 RX ORDER — GABAPENTIN 300 MG/1
300 CAPSULE ORAL 2 TIMES DAILY
Qty: 60 CAPSULE | Refills: 1 | Status: SHIPPED | OUTPATIENT
Start: 2024-08-08

## 2024-08-08 NOTE — PROGRESS NOTES
Assessment  1. Chronic midline low back pain with sciatica, sciatica laterality unspecified    2. Abnormal MRI, cervical spine    3. Abnormal MRI, thoracic spine    4. Syrinx of spinal cord (HCC)    5. Syringomyelia (HCC)        Plan  Mr. 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 Fox and last imaging done in 2014 presents to [facility] for initial evaluation regarding worsening low back pain and buttock pain.  During today's evaluation majority of his symptoms appear facet mediated and has greatly impacted his quality of life and activities of daily living.  We did discuss consideration for medial branch blocks and subsequent radiofrequency ablation, however, given the significant past medical history and abnormal MRI findings we will start with obtaining updated MRIs of the cervical thoracic and lumbar spine as well as start the patient on gabapentin 300 mg twice a day and titrate up as tolerated and necessary.  All questions answered, patient is agreeable with plan.  Will call patient with imaging results and plan moving forward.    My impressions and treatment recommendations were discussed in detail with the patient who verbalized understanding and had no further questions.  Discharge instructions were provided. I personally saw and examined the patient and I agree with the above discussed plan of care.    Orders Placed This Encounter   Procedures    MRI lumbar spine w wo contrast     Standing Status:   Future     Standing Expiration Date:   8/8/2028     Scheduling Instructions:      There is no preparation for this test. Please leave your jewelry and valuables at home, wedding rings are the exception. All patients will be required to change into a hospital gown and pants.  Street clothes are not permitted in the MRI.  Magnetic nail polish must be removed  prior to arrival for your test. Please bring your insurance cards, a form of photo ID and a list of your medications with you. Arrive 15 minutes prior to your appointment time in order to register. Please bring any prior CT or MRI studies of this area that were not performed at a [facility] facility.            To schedule this appointment, please contact Central Scheduling at [phone].            Prior to your appointment, please make sure you complete the MRI Screening Form when you e-Check in for your appointment. This will be available starting 7 days before your appointment in [employer]. You may receive an e-mail with an activation code if you do not have a [employer] account. If you do not have access to a device, we will complete your screening at your appointment.     Order Specific Question:   Reason for Exam     Answer:   Syrinx history     Order Specific Question:   What is the patient's sedation requirement?     Answer:   No Sedation     Order Specific Question:   Does the patient need medication for Claustrophobia? If yes, order medication at this point.     Answer:   No     Order Specific Question:   Does the patient wear a life vest, have an implanted cardiac device, a stimulation device, a sleep apnea stimulator, or a breast tissue expansion device?     Answer:   No     Order Specific Question:   Please evaluate if any patient has any of the following risk factors that require renal function labs prior to the MRI appointment.     Answer:   None of the Above     Order Specific Question:   Release to patient through [email or web address]     Answer:   Immediate    MRI thoracic spine w wo contrast     Standing Status:   Future     Standing Expiration Date:   8/8/2028     Scheduling Instructions:      There is no preparation for this test. Please leave your jewelry and valuables at home, wedding rings are the exception. All patients will be required to change into a hospital gown and pants.  Street clothes are not  permitted in the MRI.  Magnetic nail polish must be removed prior to arrival for your test. Please bring your insurance cards, a form of photo ID and a list of your medications with you. Arrive 15 minutes prior to your appointment time in order to register. Please bring any prior CT or MRI studies of this area that were not performed at a [facility].            To schedule this appointment, please contact Central Scheduling at [phone].            Prior to your appointment, please make sure you complete the MRI Screening Form when you e-Check in for your appointment. This will be available starting 7 days before your appointment in "[employer] You may receive an e-mail with an activation code if you do not have a "[employer] account. If you do not have access to a device, we will complete your screening at your appointment.     Order Specific Question:   Reason for Exam     Answer:   History of syrinx, abnormal previous MRI's     Order Specific Question:   What is the patient's sedation requirement?     Answer:   No Sedation     Order Specific Question:   Does the patient need medication for Claustrophobia? If yes, order medication at this point.     Answer:   No     Order Specific Question:   Does the patient wear a life vest, have an implanted cardiac device, a stimulation device, a sleep apnea stimulator, or a breast tissue expansion device?     Answer:   No     Order Specific Question:   Please evaluate if any patient has any of the following risk factors that require renal function labs prior to the MRI appointment.     Answer:   None of the Above     Order Specific Question:   Release to patient through [employer]     Answer:   Immediate    MRI cervical spine w wo contrast     Standing Status:   Future     Standing Expiration Date:   8/8/2028     Scheduling Instructions:      There is no preparation for this test. Please leave your jewelry and valuables at home, wedding rings are the exception. All  patients will be required to change into a hospital gown and pants.  Street clothes are not permitted in the MRI.  Magnetic nail polish must be removed prior to arrival for your test. Please bring your insurance cards, a form of photo ID and a list of your medications with you. Arrive 15 minutes prior to your appointment time in order to register. Please bring any prior CT or MRI studies of this area that were not performed at a [facility].            To schedule this appointment, please contact Central Scheduling at [phone].            Prior to your appointment, please make sure you complete the MRI Screening Form when you e-Check in for your appointment. This will be available starting 7 days before your appointment in [employer]. You may receive an e-mail with an activation code if you do not have a [employer] account. If you do not have access to a device, we will complete your screening at your appointment.     Order Specific Question:   Reason for Exam     Answer:   History of syrinx     Order Specific Question:   What is the patient's sedation requirement?     Answer:   No Sedation     Order Specific Question:   Does the patient need medication for Claustrophobia? If yes, order medication at this point.     Answer:   No     Order Specific Question:   Does the patient wear a life vest, have an implanted cardiac device, a stimulation device, a sleep apnea stimulator, or a breast tissue expansion device?     Answer:   No     Order Specific Question:   Please evaluate if any patient has any of the following risk factors that require renal function labs prior to the MRI appointment.     Answer:   None of the Above     Order Specific Question:   Release to patient through [employer]     Answer:   Immediate     New Medications Ordered This Visit   Medications    gabapentin (NEURONTIN) 300 mg capsule     Sig: Take 1 capsule (300 mg total) by mouth 2 (two) times a day     Dispense:  60 capsule     Refill:  1        History of Present Illness    Antonio Garrison is a 49 y.o. male presents to [facility] for initial evaluation regarding 20 years duration of low back pain.  Denies any significant inciting event or recent trauma but relates a lot of his chronic low back pain to his job as a  and .  Today reports moderate pain rated 3-7 out of 10 and interfering with activities.  Pain is nearly constant 60 to 95% of the time that is worse in the evening.  Describes symptoms as shooting, sharp, throbbing pain.  Denies any significant lower extremity weakness or falls.  Does not use any durable medical equipment for ambulation.  Symptoms are worse with lying down, bending, exercise.  Reports no relief with physical therapy.  Denies smoking or marijuana use.  Admits to alcohol use on the weekends.  Currently taking Celebrex and previously tried naproxen with some relief.  Presents today for initial evaluation.    I have personally reviewed and/or updated the patient's past medical history, past surgical history, family history, social history, current medications, allergies, and vital signs today.     Review of Systems   Respiratory:  Negative for shortness of breath.    Cardiovascular:  Negative for chest pain.   Gastrointestinal:  Negative for constipation, diarrhea, nausea and vomiting.   Musculoskeletal:  Positive for back pain and gait problem. Negative for arthralgias, joint swelling and myalgias.   Skin:  Negative for rash.   Neurological:  Negative for dizziness, seizures and weakness.   All other systems reviewed and are negative.      Patient Active Problem List   Diagnosis    Anxiety    GERD (gastroesophageal reflux disease)    Chronic midline low back pain without sciatica    Seborrheic keratoses    Bilateral hand pain    Chronic diarrhea    Joint swelling    Post-nasal discharge    Disorder of right eustachian tube    Allergic rhinitis       Past Medical History:  "  Diagnosis Date    Anxiety and depression     Colon polyp     Depression     Esophageal reflux     Fracture of both hands     Hair loss     Heartburn     Hiatal hernia     Resolved 7/24/2015     Night sweats     Sleep disturbance     Starting and stopping of urinary stream during micturition     Stomach problems     Weight gain        Past Surgical History:   Procedure Laterality Date    APPENDECTOMY  1984    COLONOSCOPY      ESOPHAGOGASTRODUODENOSCOPY      Diagnostic     HAND SURGERY      Right in 1998 and left in 1991     NOSE SURGERY      Nasal septal deviation repair        Family History   Problem Relation Age of Onset    No Known Problems Mother     Pancreatic cancer Maternal Grandmother     Colon cancer Maternal Grandfather     Dementia Paternal Grandmother        Social History     Occupational History    Occupation:    Tobacco Use    Smoking status: Former     Current packs/day: 1.00     Average packs/day: 1 pack/day for 25.0 years (25.0 ttl pk-yrs)     Types: Cigarettes    Smokeless tobacco: Never   Vaping Use    Vaping status: Never Used   Substance and Sexual Activity    Alcohol use: Yes     Comment: weekends    Drug use: Not Currently     Comment: Quit - As per [employer]     Sexual activity: Yes       Current Outpatient Medications on File Prior to Visit   Medication Sig    cetirizine (ZyrTEC) 10 mg tablet Take 1 tablet (10 mg total) by mouth daily    fluticasone (FLONASE) 50 mcg/act nasal spray 1 spray into each nostril daily    meloxicam (MOBIC) 15 mg tablet Take 1 tablet (15 mg total) by mouth in the morning    pantoprazole (PROTONIX) 40 mg tablet Take 1 tablet (40 mg total) by mouth daily     No current facility-administered medications on file prior to visit.       Allergies   Allergen Reactions    Tramadol Hives    Clindamycin Rash     Category: Allergy;        Physical Exam    /80   Pulse 86   Resp 18   Ht 5' 10\" (1.778 m)   Wt 129 kg (284 lb)   BMI 40.75 kg/m² "     Constitutional: normal, well developed, well nourished, alert, in no distress and non-toxic and no overt pain behavior.  Eyes: anicteric  HEENT: grossly intact  Neck: supple, symmetric, trachea midline and no masses   Pulmonary:even and unlabored  Cardiovascular:No edema or pitting edema present  Skin:Normal without rashes or lesions and well hydrated  Psychiatric:Mood and affect appropriate  Neurologic:Cranial Nerves II-XII grossly intact  Musculoskeletal:antalgic, tenderness to palpation bilateral lumbar paraspinals, decreased active and passive range of motion lumbar flexion and extension limited by pain, MMT 5 out of 5 bilateral lower extremities, sensation grossly intact to light touch, negative straight leg raise bilaterally, DTRs hyporeflexic left patellar and Achilles reflex, positive pain to palpation bilateral lumbar facets with axial loading and rotational forces to the left and right    Imaging

## 2024-08-12 ENCOUNTER — PATIENT OUTREACH (OUTPATIENT)
Dept: BARIATRICS | Facility: CLINIC | Age: 49
End: 2024-08-12

## 2024-08-12 NOTE — PROGRESS NOTES
"Weight History     Highest Weight: 287#  Lowest Weight: 160#    Past Attempts at Weight Loss: own diet    Food Recall  Wakes at 2:30 AM  Breakfast: skips  Snack: \"here and there\"- \"junk food\" that he picks up at a gas station;    Lunch: 1-2 PM when he gets home- sandwich (ham or turkey, tomato) OR cereal (Fruity Ana) with 2% milk   Snack: may snack or may take a  nap  Dinner: 5 PM- pork chops, vegetable; chicken, broccoli or corn or green beans or cauliflower  Snack: bowl of cereal     Beverages: Gatorade Zero- 30 oz bottle, regular homemade iced tea- 32 oz, no coffee or water; 6-7 alcoholic drinks (Twisted Tea) on the weekend   Volume of Beverage Intake: >=62 oz    Frequency of Dining out: once weekly     Would the patient benefit from visit with BH specialist?: Boredom Eating; no interested     Physical Activity Intake  Activity: unloading truck for work   Frequency of Exercise: 5 days weekly   Physical Limitations to Exercise: bad back     Review of Programs  Overview of medical weight management programs provided?: Yes   Is patient interested in discussing medication?: Yes   Is patient interested in discussing bariatric surgery?: No  Does patient know which pathway they are interested in?: Yes- conservative     "

## 2024-08-13 ENCOUNTER — OFFICE VISIT (OUTPATIENT)
Dept: BARIATRICS | Facility: CLINIC | Age: 49
End: 2024-08-13
Payer: COMMERCIAL

## 2024-08-13 VITALS
DIASTOLIC BLOOD PRESSURE: 78 MMHG | HEART RATE: 84 BPM | SYSTOLIC BLOOD PRESSURE: 116 MMHG | OXYGEN SATURATION: 97 % | RESPIRATION RATE: 20 BRPM | TEMPERATURE: 98.7 F | WEIGHT: 285.6 LBS | HEIGHT: 70 IN | BODY MASS INDEX: 40.89 KG/M2

## 2024-08-13 DIAGNOSIS — E66.01 OBESITY, CLASS III, BMI 40-49.9 (MORBID OBESITY) (HCC): Primary | ICD-10-CM

## 2024-08-13 DIAGNOSIS — R63.5 ABNORMAL WEIGHT GAIN: ICD-10-CM

## 2024-08-13 DIAGNOSIS — E78.1 HYPERTRIGLYCERIDEMIA: ICD-10-CM

## 2024-08-13 DIAGNOSIS — K21.9 GERD (GASTROESOPHAGEAL REFLUX DISEASE): ICD-10-CM

## 2024-08-13 DIAGNOSIS — Z13.1 SCREENING FOR DIABETES MELLITUS: ICD-10-CM

## 2024-08-13 PROBLEM — E66.813 OBESITY, CLASS III, BMI 40-49.9 (MORBID OBESITY): Status: ACTIVE | Noted: 2024-08-13

## 2024-08-13 PROCEDURE — 99204 OFFICE O/P NEW MOD 45 MIN: CPT | Performed by: PHYSICIAN ASSISTANT

## 2024-08-13 NOTE — PATIENT INSTRUCTIONS
Goals:    Food log (ie.) www.GLSSpal.com,[employer].com,[employer].com,[employer].com,etc.   No sugary beverages. At least 64oz of water daily.  Increase physical activity by 10 minutes daily. Gradually increase physical activity to a goal of 5 days per week for 30 minutes of MODERATE intensity PLUS 2 days per week of FULL BODY resistance training  5745-2897 calories per day  5-10 servings of fruits and vegetables per day  25-35 grams of dietary fiber per day

## 2024-08-13 NOTE — ASSESSMENT & PLAN NOTE
-Discussed options of HealthyCORE-Intensive Lifestyle Intervention Program, Very Low Calorie Diet-VLCD, Conservative Program, Deven-En-Y Gastric Bypass, and Vertical Sleeve Gastrectomy and the role of weight loss medications.  -Not a candidate for Topamax  -Initial weight loss goal of 5-10% weight loss for improved health  -Screening labs: update labs  -Patient is interested in pursuing  conservative program  -Calorie goals, sample menu, portion size guidelines, and food logging reviewed with the patient.    -Patient also has interest in AOMs. Discussed the importance of consistent dietary and lifestyle changes for long term success. Patient to check labs first  -medication agreement signed  -patient reports possible hx of pancreatitis 20 years, but can not recall exactly. Reports history of heavy ETOH use    +STOP BANG (5/8):  -reviewed risks of undiagnosed/untreated sleep apnea.  -Recommended referral to sleep medicine provider for further evaluation.  -Patient declined and would like to see if risks improve with weight loss. Repeat STOP BANG 3-6 months

## 2024-08-13 NOTE — PROGRESS NOTES
Assessment/Plan:    Obesity, Class III, BMI 40-49.9 (morbid obesity) ([facility])  -Discussed options of HealthyCORE-Intensive Lifestyle Intervention Program, Very Low Calorie Diet-VLCD, Conservative Program, Deven-En-Y Gastric Bypass, and Vertical Sleeve Gastrectomy and the role of weight loss medications.  -Not a candidate for Topamax  -Initial weight loss goal of 5-10% weight loss for improved health  -Screening labs: update labs  -Patient is interested in pursuing  conservative program  -Calorie goals, sample menu, portion size guidelines, and food logging reviewed with the patient.    -Patient also has interest in AOMs. Discussed the importance of consistent dietary and lifestyle changes for long term success. Patient to check labs first  -medication agreement signed  -patient reports possible hx of pancreatitis 20 years, but can not recall exactly. Reports history of heavy ETOH use    +STOP BANG (5/8):  -reviewed risks of undiagnosed/untreated sleep apnea.  -Recommended referral to sleep medicine provider for further evaluation.  -Patient declined and would like to see if risks improve with weight loss. Repeat STOP BANG 3-6 months     GERD (gastroesophageal reflux disease)  -On Pantoprazole  -avoid food triggers, large portion sizes  -can improve with weight loss    Goals:    Food log (ie.) www.[employer].com,sparkpeople.com,loseit.com,[employer].com,etc.   No sugary beverages. At least 64oz of water daily.  Increase physical activity by 10 minutes daily. Gradually increase physical activity to a goal of 5 days per week for 30 minutes of MODERATE intensity PLUS 2 days per week of FULL BODY resistance training  0572-1511 calories per day  5-10 servings of fruits and vegetables per day  25-35 grams of dietary fiber per day      Follow up in approximately  4 months  with Non-Surgical Physician/Advanced Practitioner.    Diagnoses and all orders for this visit:    Obesity, Class III, BMI 40-49.9 (morbid obesity)  "(HCC)  -     Hepatic function panel; Future  -     Lipid panel; Future  -     TSH, 3rd generation with Free T4 reflex; Future  -     Hemoglobin A1C; Future    Abnormal weight gain  -     Hepatic function panel; Future  -     Lipid panel; Future  -     TSH, 3rd generation with Free T4 reflex; Future  -     Hemoglobin A1C; Future    Screening for diabetes mellitus  -     Hepatic function panel; Future  -     Lipid panel; Future  -     TSH, 3rd generation with Free T4 reflex; Future  -     Hemoglobin A1C; Future    Hypertriglyceridemia  -     Hepatic function panel; Future  -     Lipid panel; Future  -     TSH, 3rd generation with Free T4 reflex; Future  -     Hemoglobin A1C; Future    GERD (gastroesophageal reflux disease)          Subjective:   Chief Complaint   Patient presents with    Consult       Patient ID: Antonio Garrison  is a 49 y.o. male with excess weight/obesity here to pursue weight managment.    Past Medical History:   Diagnosis Date    Anxiety and depression     Colon polyp     Depression     Esophageal reflux     Fracture of both hands     Hair loss     Heartburn     Hiatal hernia     Resolved 7/24/2015     Night sweats     Sleep disturbance     Starting and stopping of urinary stream during micturition     Stomach problems     Weight gain          HPI:  Obesity/Excess Weight:  Severity: Severe  Onset:  past 10+ years  Modifiers: self created diets  Contributing factors: poor eating habits  Associated symptoms:  back pain, limits him from doing things    Goals: 200-230 lbs  Highest: 287 lbs    Exercise: denies routine, but active at work  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  Sleep: 5 hours  Smoking: former, quit 5 year ago    Food recall reviewed and copied from Christen Javier RD patient outreach documentation    Food Recall  Wakes at 2:30 AM  Breakfast: skips  Snack: \"here and there\"- \"junk food\" that he picks up at a gas station;    Lunch: 1-2 PM " "when he gets home- sandwich (ham or turkey, tomato) OR cereal (Fruity Ana) with 2% milk   Snack: may snack or may take a  nap  Dinner: 5 PM- pork chops, vegetable; chicken, broccoli or corn or green beans or cauliflower  Snack: bowl of cereal      Beverages: Gatorade Zero- 30 oz bottle, regular homemade iced tea- 32 oz, no coffee or water; 6-7 alcoholic drinks (Twisted Tea) on the weekend   Volume of Beverage Intake: >=62 oz     Frequency of Dining out: once weekly     Colonoscopy: up to date 2023    The following portions of the patient's history were reviewed and updated as appropriate: allergies, current medications, past family history, past medical history, past social history, past surgical history, and problem list.    Review of Systems   Constitutional:  Negative for chills and fever.   Respiratory:  Negative for cough and shortness of breath.    Cardiovascular:  Negative for chest pain and palpitations.   Gastrointestinal:  Positive for diarrhea. Negative for abdominal pain, nausea and vomiting.   Genitourinary:  Negative for dysuria.   Musculoskeletal:  Positive for arthralgias and back pain.   Skin:  Negative for rash.   Neurological:  Positive for headaches. Negative for dizziness.   Psychiatric/Behavioral:  Negative for dysphoric mood.        Objective:    /78 (BP Location: Right arm, Patient Position: Sitting, Cuff Size: Large)   Pulse 84   Temp 98.7 °F (37.1 °C)   Resp 20   Ht 5' 10\" (1.778 m)   Wt 130 kg (285 lb 9.6 oz)   SpO2 97%   BMI 40.98 kg/m²     Physical Exam  Vitals and nursing note reviewed.      Constitutional   General appearance: Abnormal.  well developed and morbidly obese.   Eyes No conjunctival pallor.   Ears, Nose, Mouth, and Throat Oral mucosa moist.   Pulmonary   Respiratory effort: No increased work of breathing or signs of respiratory distress.    Auscultation of lungs: Clear to auscultation, equal breath sounds bilaterally, no wheezes, no rales, no rhonci.  "   Cardiovascular   Auscultation of heart: Normal rate and rhythm, normal S1 and S2, without murmurs.    Examination of extremities for edema and/or varicosities: Normal.  no edema.   Abdomen   Abdomen: Abnormal.  The abdomen was obese. Bowel sounds were normal. The abdomen was soft and nontender.   Musculoskeletal   Gait and station: Normal.    Psychiatric   Orientation to person, place and time: Normal.    Affect: appropriate

## 2024-08-14 ENCOUNTER — APPOINTMENT (OUTPATIENT)
Dept: URGENT CARE | Facility: MEDICAL CENTER | Age: 49
End: 2024-08-14

## 2024-08-17 ENCOUNTER — APPOINTMENT (OUTPATIENT)
Dept: LAB | Facility: CLINIC | Age: 49
End: 2024-08-17
Payer: COMMERCIAL

## 2024-08-17 DIAGNOSIS — R63.5 ABNORMAL WEIGHT GAIN: ICD-10-CM

## 2024-08-17 DIAGNOSIS — E78.1 HYPERTRIGLYCERIDEMIA: ICD-10-CM

## 2024-08-17 DIAGNOSIS — K21.9 CHRONIC GERD: ICD-10-CM

## 2024-08-17 DIAGNOSIS — E66.01 OBESITY, CLASS III, BMI 40-49.9 (MORBID OBESITY) (HCC): ICD-10-CM

## 2024-08-17 DIAGNOSIS — Z13.1 SCREENING FOR DIABETES MELLITUS: ICD-10-CM

## 2024-08-17 LAB
ALBUMIN SERPL BCG-MCNC: 4 G/DL (ref 3.5–5)
ALP SERPL-CCNC: 64 U/L (ref 34–104)
ALT SERPL W P-5'-P-CCNC: 28 U/L (ref 7–52)
AST SERPL W P-5'-P-CCNC: 19 U/L (ref 13–39)
BILIRUB DIRECT SERPL-MCNC: 0.17 MG/DL (ref 0–0.2)
BILIRUB SERPL-MCNC: 0.77 MG/DL (ref 0.2–1)
CHOLEST SERPL-MCNC: 168 MG/DL
HDLC SERPL-MCNC: 47 MG/DL
LDLC SERPL CALC-MCNC: 106 MG/DL (ref 0–100)
NONHDLC SERPL-MCNC: 121 MG/DL
PROT SERPL-MCNC: 6.4 G/DL (ref 6.4–8.4)
TRIGL SERPL-MCNC: 75 MG/DL
TSH SERPL DL<=0.05 MIU/L-ACNC: 0.72 UIU/ML (ref 0.45–4.5)

## 2024-08-17 PROCEDURE — 36415 COLL VENOUS BLD VENIPUNCTURE: CPT

## 2024-08-17 PROCEDURE — 84443 ASSAY THYROID STIM HORMONE: CPT

## 2024-08-17 PROCEDURE — 80061 LIPID PANEL: CPT

## 2024-08-17 PROCEDURE — 80076 HEPATIC FUNCTION PANEL: CPT

## 2024-08-17 PROCEDURE — 83036 HEMOGLOBIN GLYCOSYLATED A1C: CPT

## 2024-08-19 LAB
EST. AVERAGE GLUCOSE BLD GHB EST-MCNC: 108 MG/DL
HBA1C MFR BLD: 5.4 %

## 2024-08-19 RX ORDER — PANTOPRAZOLE SODIUM 40 MG/1
40 TABLET, DELAYED RELEASE ORAL DAILY
Qty: 90 TABLET | Refills: 0 | Status: SHIPPED | OUTPATIENT
Start: 2024-08-19

## 2024-08-28 ENCOUNTER — HOSPITAL ENCOUNTER (OUTPATIENT)
Dept: MRI IMAGING | Facility: HOSPITAL | Age: 49
Discharge: HOME | End: 2024-08-28
Attending: PHYSICAL MEDICINE & REHABILITATION
Payer: COMMERCIAL

## 2024-08-28 DIAGNOSIS — G95.0 SYRINX OF SPINAL CORD (HCC): ICD-10-CM

## 2024-08-28 DIAGNOSIS — R93.7 ABNORMAL MRI, THORACIC SPINE: ICD-10-CM

## 2024-08-28 DIAGNOSIS — G89.29 CHRONIC MIDLINE LOW BACK PAIN WITH SCIATICA, SCIATICA LATERALITY UNSPECIFIED: ICD-10-CM

## 2024-08-28 DIAGNOSIS — G95.0 SYRINGOMYELIA (HCC): ICD-10-CM

## 2024-08-28 DIAGNOSIS — M54.40 CHRONIC MIDLINE LOW BACK PAIN WITH SCIATICA, SCIATICA LATERALITY UNSPECIFIED: ICD-10-CM

## 2024-08-28 DIAGNOSIS — R93.7 ABNORMAL MRI, CERVICAL SPINE: ICD-10-CM

## 2024-08-28 PROCEDURE — 72156 MRI NECK SPINE W/O & W/DYE: CPT

## 2024-08-28 PROCEDURE — 72157 MRI CHEST SPINE W/O & W/DYE: CPT

## 2024-08-28 PROCEDURE — A9585 GADOBUTROL INJECTION: HCPCS | Performed by: FAMILY MEDICINE

## 2024-08-28 RX ORDER — GADOBUTROL 604.72 MG/ML
13 INJECTION INTRAVENOUS
Status: COMPLETED | OUTPATIENT
Start: 2024-08-28 | End: 2024-08-28

## 2024-08-28 RX ADMIN — GADOBUTROL 13 ML: 604.72 INJECTION INTRAVENOUS at 20:02

## 2024-08-30 ENCOUNTER — TELEPHONE (OUTPATIENT)
Dept: PAIN MEDICINE | Facility: CLINIC | Age: 49
End: 2024-08-30

## 2024-08-30 NOTE — TELEPHONE ENCOUNTER
Rocky Isaacs, DO  [facility]  Please notify patient cervical and thoracic MRIs demonstrating stable appearance thoracic syrinx unchanged from 2015 with cystic expansion of the cervical central canal  Awaiting MRI lumbar spine would consider MBB's and subsequent radiofrequency ablation pending results  Thank you

## 2024-09-13 DIAGNOSIS — E66.01 OBESITY, CLASS III, BMI 40-49.9 (MORBID OBESITY) (HCC): Primary | ICD-10-CM

## 2024-09-21 ENCOUNTER — HOSPITAL ENCOUNTER (OUTPATIENT)
Dept: MRI IMAGING | Facility: HOSPITAL | Age: 49
Discharge: HOME | End: 2024-09-21
Attending: PHYSICAL MEDICINE & REHABILITATION
Payer: COMMERCIAL

## 2024-09-21 DIAGNOSIS — G89.29 CHRONIC MIDLINE LOW BACK PAIN WITH SCIATICA, SCIATICA LATERALITY UNSPECIFIED: ICD-10-CM

## 2024-09-21 DIAGNOSIS — G95.0 SYRINX OF SPINAL CORD (HCC): ICD-10-CM

## 2024-09-21 DIAGNOSIS — M54.40 CHRONIC MIDLINE LOW BACK PAIN WITH SCIATICA, SCIATICA LATERALITY UNSPECIFIED: ICD-10-CM

## 2024-09-21 DIAGNOSIS — R93.7 ABNORMAL MRI, THORACIC SPINE: ICD-10-CM

## 2024-09-21 DIAGNOSIS — G95.0 SYRINGOMYELIA (HCC): ICD-10-CM

## 2024-09-21 DIAGNOSIS — R93.7 ABNORMAL MRI, CERVICAL SPINE: ICD-10-CM

## 2024-09-21 PROCEDURE — 72158 MRI LUMBAR SPINE W/O & W/DYE: CPT

## 2024-09-21 PROCEDURE — A9585 GADOBUTROL INJECTION: HCPCS | Performed by: PHYSICAL MEDICINE & REHABILITATION

## 2024-09-21 RX ORDER — GADOBUTROL 604.72 MG/ML
12 INJECTION INTRAVENOUS
Status: COMPLETED | OUTPATIENT
Start: 2024-09-21 | End: 2024-09-21

## 2024-09-21 RX ADMIN — GADOBUTROL 12 ML: 604.72 INJECTION INTRAVENOUS at 17:37

## 2024-09-24 ENCOUNTER — TELEPHONE (OUTPATIENT)
Dept: PAIN MEDICINE | Facility: CLINIC | Age: 49
End: 2024-09-24

## 2024-09-24 NOTE — TELEPHONE ENCOUNTER
S/W pt and advised of results  Would like to move forward with bilateral L2, L3, L4 MBB #1   Aware the  would be calling him

## 2024-09-24 NOTE — TELEPHONE ENCOUNTER
----- Message from Rocky Isaacs DO sent at 9/24/2024  9:09 AM EDT -----  Please notify patient MRI lumbar spine demonstrating multilevel degenerative changes, disc bulging and facet hypertrophy throughout the lumbar spine  Recently started on gabapentin and did discuss bilateral medial branch blocks to address the facet axial low back pain  If he now wishes to proceed would plan for bilateral L2, L3, L4 MBB #1  Please advise and schedule if interested  Thank you  ----- Message -----  From: Interface, Radiology Results In  Sent: 9/23/2024   4:56 PM EDT  To: Rocky Isaacs DO

## 2024-09-24 NOTE — TELEPHONE ENCOUNTER
Caller: ezra Alvarez     Doctor: Ferny    Reason for call: pt returning nurses call     Call back#: [phone]

## 2024-09-25 NOTE — TELEPHONE ENCOUNTER
Patient has been scheduled for their procedure, please see "[employer]t message for additional details.

## 2024-09-29 ENCOUNTER — HOSPITAL ENCOUNTER (EMERGENCY)
Facility: HOSPITAL | Age: 49
Discharge: HOME | End: 2024-09-29
Attending: EMERGENCY MEDICINE
Payer: COMMERCIAL

## 2024-09-29 ENCOUNTER — APPOINTMENT (EMERGENCY)
Dept: RADIOLOGY | Facility: HOSPITAL | Age: 49
End: 2024-09-29
Payer: COMMERCIAL

## 2024-09-29 VITALS
HEART RATE: 79 BPM | OXYGEN SATURATION: 96 % | RESPIRATION RATE: 20 BRPM | TEMPERATURE: 97.6 F | HEIGHT: 70 IN | WEIGHT: 283.73 LBS | DIASTOLIC BLOOD PRESSURE: 72 MMHG | SYSTOLIC BLOOD PRESSURE: 129 MMHG | BODY MASS INDEX: 40.62 KG/M2

## 2024-09-29 DIAGNOSIS — M25.512 ACUTE PAIN OF LEFT SHOULDER: Primary | ICD-10-CM

## 2024-09-29 PROCEDURE — 73030 X-RAY EXAM OF SHOULDER: CPT

## 2024-09-29 PROCEDURE — 99283 EMERGENCY DEPT VISIT LOW MDM: CPT

## 2024-09-29 PROCEDURE — 99284 EMERGENCY DEPT VISIT MOD MDM: CPT | Performed by: EMERGENCY MEDICINE

## 2024-09-29 NOTE — Clinical Note
Antonio Garrison was seen and treated in our emergency department on 9/29/2024.    No restrictions        no lifting >10 lbs with left arm until 10/07    Diagnosis:     Antonio  .    He may return on this date:          If you have any questions or concerns, please don't hesitate to call.      Benita Mendez MD    ______________________________           _______________          _______________  Hospital Representative                              Date                                Time

## 2024-09-29 NOTE — ED PROVIDER NOTES
"Final diagnoses:   Acute pain of left shoulder     ED Disposition       ED Disposition   Discharge    Condition   Good    Date/Time   Sun Sep 29, 2024 11:26 AM    Comment   Antonio Garrison discharge to home/self care.             Assessment & Plan       Medical Decision Making  This is a 49-year-old male who presents here today for evaluation of left shoulder pain.  He rolled over the night and felt a \"pop\" in his shoulder but was able to go back to sleep.  He woke up this morning with a \"dull pain\" in his shoulder.  It is sometimes worse with movement though he does endorse full range of motion.  He has a rotator cuff tear on the right, but no prior problems with the left arm.  He has no weakness or numbness, other problems with the shoulder previously.  He has not taken or done anything for pain.  His wife looked at it and thought he might of dislocated it, so he came today for evaluation.    Review of systems: Otherwise negative unless stated as above    He is well-appearing, no acute distress.  He has mild tenderness to the left shoulder, but full range of motion.  There is no deformity.  Exam is otherwise unremarkable.  Shoulders x-rays had been taken from triage and were evaluated by myself, and show no acute bony injuries.  I discussed with the patient findings, continued management at home, follow-up, indications for return, and he expresses understanding with this plan.    Amount and/or Complexity of Data Reviewed  Radiology: ordered.             Medications - No data to display    ED Risk Strat Scores                           SBIRT 22yo+      Flowsheet Row Most Recent Value   Initial Alcohol Screen: US AUDIT-C     1. How often do you have a drink containing alcohol? 3 Filed at: 09/29/2024 1042   2. How many drinks containing alcohol do you have on a typical day you are drinking?  1 Filed at: 09/29/2024 1042   3a. Male UNDER 65: How often do you have five or more drinks on one occasion? 0 Filed at: " "09/29/2024 1042   Audit-C Score 4 Filed at: 09/29/2024 1042   SHONA: How many times in the past year have you...    Used an illegal drug or used a prescription medication for non-medical reasons? Never Filed at: 09/29/2024 1042                            History of Present Illness       Chief Complaint   Patient presents with    Shoulder Pain     Pt states \"I think I dislocated my L shoulder in my sleep. I woke up and felt a pop. No hx of dislocations.\"       Past Medical History:   Diagnosis Date    Anxiety and depression     Colon polyp     Depression     Esophageal reflux     Fracture of both hands     Hair loss     Heartburn     Hiatal hernia     Resolved 7/24/2015     Night sweats     Sleep disturbance     Starting and stopping of urinary stream during micturition     Stomach problems     Weight gain       Past Surgical History:   Procedure Laterality Date    APPENDECTOMY  1984    COLONOSCOPY      ESOPHAGOGASTRODUODENOSCOPY      Diagnostic     HAND SURGERY      Right in 1998 and left in 1991     NOSE SURGERY      Nasal septal deviation repair       Family History   Problem Relation Age of Onset    No Known Problems Mother     Diabetes Father     Diabetes Maternal Uncle     Heart disease Maternal Grandmother     Diabetes Maternal Grandmother     Pancreatic cancer Maternal Grandmother     Colon cancer Maternal Grandfather     Dementia Paternal Grandmother       Social History     Tobacco Use    Smoking status: Former     Current packs/day: 1.00     Average packs/day: 1 pack/day for 25.0 years (25.0 ttl pk-yrs)     Types: Cigarettes    Smokeless tobacco: Never   Vaping Use    Vaping status: Never Used   Substance Use Topics    Alcohol use: Yes     Comment: weekends    Drug use: Not Currently     Comment: Quit - As per [employer]       E-Cigarette/Vaping    E-Cigarette Use Never User       E-Cigarette/Vaping Substances    Nicotine No     THC No     CBD No     Flavoring No     Other No     Unknown No       I have " reviewed and agree with the history as documented.       Shoulder Pain      Review of Systems        Objective       ED Triage Vitals [09/29/24 1040]   Temperature Pulse Blood Pressure Respirations SpO2 Patient Position - Orthostatic VS   97.6 °F (36.4 °C) 79 129/72 20 96 % Sitting      Temp Source Heart Rate Source BP Location FiO2 (%) Pain Score    Temporal Monitor Right arm -- --      Vitals      Date and Time Temp Pulse SpO2 Resp BP Pain Score FACES Pain Rating User   09/29/24 1040 97.6 °F (36.4 °C) 79 96 % 20 129/72 -- -- CO            Physical Exam  Vitals and nursing note reviewed.   Constitutional:       General: He is not in acute distress.     Appearance: He is well-developed. He is obese.   HENT:      Head: Normocephalic and atraumatic.   Eyes:      Conjunctiva/sclera: Conjunctivae normal.      Pupils: Pupils are equal, round, and reactive to light.   Neck:      Trachea: No tracheal deviation.   Cardiovascular:      Rate and Rhythm: Normal rate and regular rhythm.      Pulses:           Radial pulses are 2+ on the left side.   Pulmonary:      Effort: Pulmonary effort is normal. No respiratory distress.   Musculoskeletal:      Left shoulder: Tenderness (mild, lateral) present. No swelling, deformity, bony tenderness or crepitus. Normal range of motion.      Left upper arm: No tenderness.      Cervical back: Normal range of motion. No tenderness or bony tenderness.      Thoracic back: No tenderness or bony tenderness.   Skin:     General: Skin is warm and dry.   Neurological:      Mental Status: He is alert and oriented to person, place, and time.      GCS: GCS eye subscore is 4. GCS verbal subscore is 5. GCS motor subscore is 6.   Psychiatric:         Mood and Affect: Mood and affect normal.         Behavior: Behavior normal.         Results Reviewed       None            XR shoulder 2+ views LEFT   Final Interpretation by Daryl Cerna MD (09/29 3139)      No acute osseous abnormality.         Computerized  Assisted Algorithm (CAA) may have been used to analyze all applicable images.         Workstation performed: [id number]             Procedures    ED Medication and Procedure Management   Prior to Admission Medications   Prescriptions Last Dose Informant Patient Reported? Taking?   Semaglutide-Weight Management (WEGOVY) 0.25 MG/0.5ML   No No   Sig: Inject 0.5 mL (0.25 mg total) under the skin once a week for 4 doses   cetirizine (ZyrTEC) 10 mg tablet  Self No No   Sig: Take 1 tablet (10 mg total) by mouth daily   fluticasone (FLONASE) 50 mcg/act nasal spray  Self No No   Si spray into each nostril daily   gabapentin (NEURONTIN) 300 mg capsule   No No   Sig: Take 1 capsule (300 mg total) by mouth 2 (two) times a day   meloxicam (MOBIC) 15 mg tablet  Self No No   Sig: Take 1 tablet (15 mg total) by mouth in the morning   pantoprazole (PROTONIX) 40 mg tablet   No No   Sig: TAKE ONE TABLET BY MOUTH ONE TIME DAILY      Facility-Administered Medications: None     Discharge Medication List as of 2024 11:35 AM        CONTINUE these medications which have NOT CHANGED    Details   cetirizine (ZyrTEC) 10 mg tablet Take 1 tablet (10 mg total) by mouth daily, Starting 2024, Normal      fluticasone (FLONASE) 50 mcg/act nasal spray 1 spray into each nostril daily, Starting 2024, Normal      gabapentin (NEURONTIN) 300 mg capsule Take 1 capsule (300 mg total) by mouth 2 (two) times a day, Starting Thu 2024, Normal      meloxicam (MOBIC) 15 mg tablet Take 1 tablet (15 mg total) by mouth in the morning, Starting 2024, Normal      pantoprazole (PROTONIX) 40 mg tablet TAKE ONE TABLET BY MOUTH ONE TIME DAILY, Starting Mon 2024, Normal      Semaglutide-Weight Management (WEGOVY) 0.25 MG/0.5ML Inject 0.5 mL (0.25 mg total) under the skin once a week for 4 doses, Starting Fri 2024, Until Sat 10/5/2024, Normal           No discharge procedures on file.  ED SEPSIS DOCUMENTATION   Time reflects  when diagnosis was documented in both MDM as applicable and the Disposition within this note       Time User Action Codes Description Comment    9/29/2024 11:26 AM Benita Mendez Add [M25.512] Acute pain of left shoulder                  Benita Mendez MD  10/01/24 1845

## 2024-09-29 NOTE — DISCHARGE INSTRUCTIONS
Take ibuprofen (Motrin, Advil) or acetaminophen (Tylenol) as needed for pain, as per the instructions.  Use ice or heat, and topical medications to the area.  Do activities as you are able to tolerate, but avoid heavy lifting for several days to give your shoulder a chance to rest.  Follow-up with your primary care doctor, or the orthopedic surgeon, as needed, for further evaluation of your pain.

## 2024-09-30 ENCOUNTER — TELEPHONE (OUTPATIENT)
Dept: BARIATRICS | Facility: CLINIC | Age: 49
End: 2024-09-30

## 2024-09-30 NOTE — TELEPHONE ENCOUNTER
PA for Wegovy 0.25mg SUBMITTED     via    [x]CMM-KEY: [id number]  [][employer]-Case ID #   []Faxed to plan   []Other website   []Phone call Case ID #     Office notes sent, clinical questions answered. Awaiting determination    Turnaround time for your insurance to make a decision on your Prior Authorization can take 7-21 business days.

## 2024-10-23 ENCOUNTER — TELEPHONE (OUTPATIENT)
Dept: OBGYN CLINIC | Facility: CLINIC | Age: 49
End: 2024-10-23

## 2024-10-23 ENCOUNTER — HOSPITAL ENCOUNTER (OUTPATIENT)
Dept: RADIOLOGY | Facility: HOSPITAL | Age: 49
Discharge: HOME | End: 2024-10-23
Attending: PHYSICAL MEDICINE & REHABILITATION
Payer: COMMERCIAL

## 2024-10-23 VITALS
RESPIRATION RATE: 18 BRPM | TEMPERATURE: 97.2 F | SYSTOLIC BLOOD PRESSURE: 135 MMHG | DIASTOLIC BLOOD PRESSURE: 72 MMHG | HEART RATE: 61 BPM | OXYGEN SATURATION: 96 %

## 2024-10-23 DIAGNOSIS — M47.816 LUMBAR SPONDYLOSIS: ICD-10-CM

## 2024-10-23 PROCEDURE — 64494 INJ PARAVERT F JNT L/S 2 LEV: CPT | Performed by: PHYSICAL MEDICINE & REHABILITATION

## 2024-10-23 PROCEDURE — 64493 INJ PARAVERT F JNT L/S 1 LEV: CPT | Performed by: PHYSICAL MEDICINE & REHABILITATION

## 2024-10-23 RX ORDER — BUPIVACAINE HCL/PF 2.5 MG/ML
3 VIAL (ML) INJECTION ONCE
Status: COMPLETED | OUTPATIENT
Start: 2024-10-23 | End: 2024-10-23

## 2024-10-23 RX ADMIN — BUPIVACAINE HYDROCHLORIDE 3 ML: 2.5 INJECTION, SOLUTION EPIDURAL; INFILTRATION; INTRACAUDAL at 15:21

## 2024-10-23 NOTE — H&P
History of Present Illness: The patient is a 49 y.o. male who presents with complaints of low back pain    Past Medical History:   Diagnosis Date    Anxiety and depression     Colon polyp     Depression     Esophageal reflux     Fracture of both hands     Hair loss     Heartburn     Hiatal hernia     Resolved 7/24/2015     Night sweats     Sleep disturbance     Starting and stopping of urinary stream during micturition     Stomach problems     Weight gain        Past Surgical History:   Procedure Laterality Date    APPENDECTOMY  1984    COLONOSCOPY      ESOPHAGOGASTRODUODENOSCOPY      Diagnostic     HAND SURGERY      Right in 1998 and left in 1991     NOSE SURGERY      Nasal septal deviation repair          Current Outpatient Medications:     cetirizine (ZyrTEC) 10 mg tablet, Take 1 tablet (10 mg total) by mouth daily, Disp: 30 tablet, Rfl: 2    fluticasone (FLONASE) 50 mcg/act nasal spray, 1 spray into each nostril daily, Disp: 16 g, Rfl: 2    gabapentin (NEURONTIN) 300 mg capsule, Take 1 capsule (300 mg total) by mouth 2 (two) times a day, Disp: 60 capsule, Rfl: 1    meloxicam (MOBIC) 15 mg tablet, Take 1 tablet (15 mg total) by mouth in the morning, Disp: 30 tablet, Rfl: 1    pantoprazole (PROTONIX) 40 mg tablet, TAKE ONE TABLET BY MOUTH ONE TIME DAILY, Disp: 90 tablet, Rfl: 0    Current Facility-Administered Medications:     bupivacaine (PF) (MARCAINE) 0.25 % injection 3 mL, 3 mL, Perineural, Once, [place],     Allergies   Allergen Reactions    Tramadol Hives    Clindamycin Rash     Category: Allergy;        Physical Exam: There were no vitals filed for this visit.  General: Awake, Alert, Oriented x 3, Mood and affect appropriate  Respiratory: Respirations even and unlabored  Cardiovascular: Peripheral pulses intact; no edema  Musculoskeletal Exam: tenderness to palpation bilateral lumbar paraspinals     ASA Score: 2         Assessment:   1. Lumbar spondylosis        Plan: bilateral L2, L3, L4 MBB  #1

## 2024-10-23 NOTE — DISCHARGE INSTR - LAB

## 2024-10-25 ENCOUNTER — TELEPHONE (OUTPATIENT)
Dept: PAIN MEDICINE | Facility: CLINIC | Age: 49
End: 2024-10-25

## 2024-10-25 NOTE — TELEPHONE ENCOUNTER
Left message for patient to contact the scheduling office at [phone] to schedule their procedure.

## 2024-11-15 DIAGNOSIS — K21.9 CHRONIC GERD: ICD-10-CM

## 2024-11-15 RX ORDER — PANTOPRAZOLE SODIUM 40 MG/1
40 TABLET, DELAYED RELEASE ORAL DAILY
Qty: 90 TABLET | Refills: 0 | Status: SHIPPED | OUTPATIENT
Start: 2024-11-15

## 2024-12-06 ENCOUNTER — OFFICE VISIT (OUTPATIENT)
Dept: FAMILY MEDICINE CLINIC | Facility: CLINIC | Age: 49
End: 2024-12-06
Payer: COMMERCIAL

## 2024-12-06 VITALS
BODY MASS INDEX: 41.23 KG/M2 | SYSTOLIC BLOOD PRESSURE: 146 MMHG | OXYGEN SATURATION: 95 % | HEIGHT: 70 IN | DIASTOLIC BLOOD PRESSURE: 87 MMHG | WEIGHT: 288 LBS | HEART RATE: 90 BPM | TEMPERATURE: 98.7 F

## 2024-12-06 DIAGNOSIS — J01.00 ACUTE NON-RECURRENT MAXILLARY SINUSITIS: Primary | ICD-10-CM

## 2024-12-06 PROCEDURE — 99213 OFFICE O/P EST LOW 20 MIN: CPT

## 2024-12-06 NOTE — PROGRESS NOTES
Name: Antonio Garrison      : 1975      MRN: [id number]  Encounter Provider: Julio C Woods PA-C  Encounter Date: 2024   Encounter department: [facility]    Assessment & Plan  Acute non-recurrent maxillary sinusitis  Pt complains of sx including dry cough, sinus pressure, headache, sinus and nasal congestion, and chest congestion  Sx have been present for 5 day(s) and is unchanged since onset.   Pt has attempted to alleviate their current sx with Nyquil/Dayquil, Mucinex, tylenol which has provided some relief.   Pertinent negatives: no chest pain, difficulty breathing, fever, nausea, or vomiting.   Exam reveals tympanic membrane bulging without erythema, congested phonation, and mild sinus tenderness to palpation  Given history and duration of symptoms, will initiate treatment with antibiotics at this time  Also advised conservative OTC treatment with Flonase patient has at home already  Continue to monitor follow-up as needed for worsening or nonimprovement  Orders:    amoxicillin-clavulanate (AUGMENTIN) 875-125 mg per tablet; Take 1 tablet by mouth every 12 (twelve) hours for 7 days Take 2x daily for 7 days. Take with food.         History of Present Illness     Antonio Garrison is a 49 y.o. male  presenting for sick visit.  He tells me he has had upper respiratory and sinus symptoms for the past few days without improvement of at home treatment.          Review of Systems   Constitutional:  Negative for chills and fever.   HENT:  Positive for congestion, postnasal drip, rhinorrhea and sinus pressure. Negative for ear pain and sore throat.    Eyes:  Negative for pain and visual disturbance.   Respiratory:  Positive for cough. Negative for shortness of breath.    Cardiovascular:  Negative for chest pain and palpitations.   Gastrointestinal:  Negative for abdominal pain, constipation, diarrhea, nausea and vomiting.   Genitourinary:  Negative for dysuria and hematuria.    Musculoskeletal:  Negative for arthralgias and back pain.   Skin:  Negative for color change and rash.   Neurological:  Positive for headaches. Negative for seizures and syncope.   All other systems reviewed and are negative.    Past Medical History:   Diagnosis Date    Anxiety and depression     Colon polyp     Depression     Esophageal reflux     Fracture of both hands     Hair loss     Heartburn     Hiatal hernia     Resolved 7/24/2015     Night sweats     Sleep disturbance     Starting and stopping of urinary stream during micturition     Stomach problems     Weight gain      Past Surgical History:   Procedure Laterality Date    APPENDECTOMY  1984    COLONOSCOPY      ESOPHAGOGASTRODUODENOSCOPY      Diagnostic     HAND SURGERY      Right in 1998 and left in 1991     NOSE SURGERY      Nasal septal deviation repair      Family History   Problem Relation Age of Onset    No Known Problems Mother     Diabetes Father     Diabetes Maternal Uncle     Heart disease Maternal Grandmother     Diabetes Maternal Grandmother     Pancreatic cancer Maternal Grandmother     Colon cancer Maternal Grandfather     Dementia Paternal Grandmother      Social History     Tobacco Use    Smoking status: Former     Current packs/day: 1.00     Average packs/day: 1 pack/day for 25.0 years (25.0 ttl pk-yrs)     Types: Cigarettes    Smokeless tobacco: Never   Vaping Use    Vaping status: Never Used   Substance and Sexual Activity    Alcohol use: Yes     Comment: weekends    Drug use: Not Currently     Comment: Quit - As per [employer]     Sexual activity: Yes     Current Outpatient Medications on File Prior to Visit   Medication Sig    cetirizine (ZyrTEC) 10 mg tablet Take 1 tablet (10 mg total) by mouth daily    fluticasone (FLONASE) 50 mcg/act nasal spray 1 spray into each nostril daily    gabapentin (NEURONTIN) 300 mg capsule Take 1 capsule (300 mg total) by mouth 2 (two) times a day    meloxicam (MOBIC) 15 mg tablet Take 1 tablet (15 mg  "total) by mouth in the morning    pantoprazole (PROTONIX) 40 mg tablet TAKE ONE TABLET BY MOUTH ONE TIME DAILY     Allergies   Allergen Reactions    Tramadol Hives    Clindamycin Rash     Category: Allergy;      Immunization History   Administered Date(s) Administered    COVID-19 J&J ([employer]) vaccine 0.5 mL 09/14/2021    Tdap 08/11/2015     Objective   /87   Pulse 90   Temp 98.7 °F (37.1 °C)   Ht 5' 10\" (1.778 m)   Wt 131 kg (288 lb)   SpO2 95%   BMI 41.32 kg/m²     Physical Exam  Vitals and nursing note reviewed.   Constitutional:       General: He is not in acute distress.     Appearance: He is well-developed.   HENT:      Head: Normocephalic and atraumatic.      Right Ear: Hearing normal. Tympanic membrane is bulging.      Left Ear: Hearing normal. Tympanic membrane is bulging.      Nose: Congestion and rhinorrhea present.      Right Sinus: Maxillary sinus tenderness present.      Left Sinus: Maxillary sinus tenderness present.   Eyes:      Conjunctiva/sclera: Conjunctivae normal.   Cardiovascular:      Rate and Rhythm: Normal rate and regular rhythm.      Heart sounds: No murmur heard.  Pulmonary:      Effort: Pulmonary effort is normal. No respiratory distress.      Breath sounds: Normal breath sounds.   Abdominal:      Palpations: Abdomen is soft.      Tenderness: There is no abdominal tenderness.   Musculoskeletal:         General: No swelling.      Cervical back: Neck supple.   Skin:     General: Skin is warm and dry.      Capillary Refill: Capillary refill takes less than 2 seconds.   Neurological:      Mental Status: He is alert.   Psychiatric:         Mood and Affect: Mood normal.         "

## 2025-01-07 ENCOUNTER — APPOINTMENT (EMERGENCY)
Dept: CT IMAGING | Facility: HOSPITAL | Age: 50
DRG: 660 | End: 2025-01-07
Payer: COMMERCIAL

## 2025-01-07 ENCOUNTER — HOSPITAL ENCOUNTER (INPATIENT)
Facility: HOSPITAL | Age: 50
LOS: 1 days | Discharge: HOME | DRG: 660 | End: 2025-01-10
Attending: EMERGENCY MEDICINE | Admitting: FAMILY MEDICINE
Payer: COMMERCIAL

## 2025-01-07 DIAGNOSIS — R52 INTRACTABLE PAIN: ICD-10-CM

## 2025-01-07 DIAGNOSIS — K59.00 CONSTIPATION: ICD-10-CM

## 2025-01-07 DIAGNOSIS — Q62.11 HYDRONEPHROSIS WITH URETEROPELVIC JUNCTION (UPJ) OBSTRUCTION: ICD-10-CM

## 2025-01-07 DIAGNOSIS — N20.1 URETEROLITHIASIS: ICD-10-CM

## 2025-01-07 DIAGNOSIS — N20.0 LEFT RENAL STONE: Primary | ICD-10-CM

## 2025-01-07 LAB
ALBUMIN SERPL BCG-MCNC: 4.1 G/DL (ref 3.5–5)
ALP SERPL-CCNC: 73 U/L (ref 34–104)
ALT SERPL W P-5'-P-CCNC: 38 U/L (ref 7–52)
ANION GAP SERPL CALCULATED.3IONS-SCNC: 6 MMOL/L (ref 4–13)
AST SERPL W P-5'-P-CCNC: 27 U/L (ref 13–39)
BACTERIA UR QL AUTO: ABNORMAL /HPF
BASOPHILS # BLD AUTO: 0.05 THOUSANDS/ΜL (ref 0–0.1)
BASOPHILS NFR BLD AUTO: 1 % (ref 0–1)
BILIRUB SERPL-MCNC: 0.48 MG/DL (ref 0.2–1)
BILIRUB UR QL STRIP: NEGATIVE
BUN SERPL-MCNC: 14 MG/DL (ref 5–25)
CALCIUM SERPL-MCNC: 10 MG/DL (ref 8.4–10.2)
CAOX CRY URNS QL MICRO: ABNORMAL /HPF
CHLORIDE SERPL-SCNC: 107 MMOL/L (ref 96–108)
CLARITY UR: CLEAR
CO2 SERPL-SCNC: 25 MMOL/L (ref 21–32)
COLOR UR: YELLOW
CREAT SERPL-MCNC: 0.95 MG/DL (ref 0.6–1.3)
EOSINOPHIL # BLD AUTO: 0.38 THOUSAND/ΜL (ref 0–0.61)
EOSINOPHIL NFR BLD AUTO: 5 % (ref 0–6)
ERYTHROCYTE [DISTWIDTH] IN BLOOD BY AUTOMATED COUNT: 12.7 % (ref 11.6–15.1)
GFR SERPL CREATININE-BSD FRML MDRD: 92 ML/MIN/1.73SQ M
GLUCOSE SERPL-MCNC: 161 MG/DL (ref 65–140)
GLUCOSE UR STRIP-MCNC: NEGATIVE MG/DL
HCT VFR BLD AUTO: 44 % (ref 36.5–49.3)
HGB BLD-MCNC: 14.9 G/DL (ref 12–17)
HGB UR QL STRIP.AUTO: ABNORMAL
IMM GRANULOCYTES # BLD AUTO: 0.02 THOUSAND/UL (ref 0–0.2)
IMM GRANULOCYTES NFR BLD AUTO: 0 % (ref 0–2)
KETONES UR STRIP-MCNC: NEGATIVE MG/DL
LEUKOCYTE ESTERASE UR QL STRIP: NEGATIVE
LYMPHOCYTES # BLD AUTO: 2.4 THOUSANDS/ΜL (ref 0.6–4.47)
LYMPHOCYTES NFR BLD AUTO: 30 % (ref 14–44)
MCH RBC QN AUTO: 29.4 PG (ref 26.8–34.3)
MCHC RBC AUTO-ENTMCNC: 33.9 G/DL (ref 31.4–37.4)
MCV RBC AUTO: 87 FL (ref 82–98)
MONOCYTES # BLD AUTO: 0.76 THOUSAND/ΜL (ref 0.17–1.22)
MONOCYTES NFR BLD AUTO: 10 % (ref 4–12)
MUCOUS THREADS UR QL AUTO: ABNORMAL
NEUTROPHILS # BLD AUTO: 4.29 THOUSANDS/ΜL (ref 1.85–7.62)
NEUTS SEG NFR BLD AUTO: 54 % (ref 43–75)
NITRITE UR QL STRIP: NEGATIVE
NON-SQ EPI CELLS URNS QL MICRO: ABNORMAL /HPF
NRBC BLD AUTO-RTO: 0 /100 WBCS
PH UR STRIP.AUTO: 5.5 [PH]
PLATELET # BLD AUTO: 230 THOUSANDS/UL (ref 149–390)
PMV BLD AUTO: 9.9 FL (ref 8.9–12.7)
POTASSIUM SERPL-SCNC: 3.8 MMOL/L (ref 3.5–5.3)
PROT SERPL-MCNC: 6 G/DL (ref 6.4–8.4)
PROT UR STRIP-MCNC: NEGATIVE MG/DL
RBC # BLD AUTO: 5.06 MILLION/UL (ref 3.88–5.62)
RBC #/AREA URNS AUTO: ABNORMAL /HPF
SODIUM SERPL-SCNC: 138 MMOL/L (ref 135–147)
SP GR UR STRIP.AUTO: 1.02 (ref 1–1.03)
UROBILINOGEN UR STRIP-ACNC: <2 MG/DL
WBC # BLD AUTO: 7.9 THOUSAND/UL (ref 4.31–10.16)
WBC #/AREA URNS AUTO: ABNORMAL /HPF

## 2025-01-07 PROCEDURE — 81001 URINALYSIS AUTO W/SCOPE: CPT | Performed by: EMERGENCY MEDICINE

## 2025-01-07 PROCEDURE — 96374 THER/PROPH/DIAG INJ IV PUSH: CPT

## 2025-01-07 PROCEDURE — 80053 COMPREHEN METABOLIC PANEL: CPT | Performed by: EMERGENCY MEDICINE

## 2025-01-07 PROCEDURE — 96376 TX/PRO/DX INJ SAME DRUG ADON: CPT

## 2025-01-07 PROCEDURE — 36415 COLL VENOUS BLD VENIPUNCTURE: CPT | Performed by: EMERGENCY MEDICINE

## 2025-01-07 PROCEDURE — 85025 COMPLETE CBC W/AUTO DIFF WBC: CPT | Performed by: EMERGENCY MEDICINE

## 2025-01-07 PROCEDURE — 99284 EMERGENCY DEPT VISIT MOD MDM: CPT

## 2025-01-07 PROCEDURE — 74176 CT ABD & PELVIS W/O CONTRAST: CPT

## 2025-01-07 PROCEDURE — 99285 EMERGENCY DEPT VISIT HI MDM: CPT | Performed by: EMERGENCY MEDICINE

## 2025-01-07 PROCEDURE — 96375 TX/PRO/DX INJ NEW DRUG ADDON: CPT

## 2025-01-07 PROCEDURE — 99222 1ST HOSP IP/OBS MODERATE 55: CPT

## 2025-01-07 RX ORDER — KETOROLAC TROMETHAMINE 30 MG/ML
15 INJECTION, SOLUTION INTRAMUSCULAR; INTRAVENOUS ONCE
Status: COMPLETED | OUTPATIENT
Start: 2025-01-07 | End: 2025-01-07

## 2025-01-07 RX ORDER — DIPHENHYDRAMINE HYDROCHLORIDE 50 MG/ML
25 INJECTION INTRAMUSCULAR; INTRAVENOUS ONCE
Status: COMPLETED | OUTPATIENT
Start: 2025-01-07 | End: 2025-01-07

## 2025-01-07 RX ORDER — ONDANSETRON 2 MG/ML
4 INJECTION INTRAMUSCULAR; INTRAVENOUS EVERY 6 HOURS PRN
Status: DISCONTINUED | OUTPATIENT
Start: 2025-01-07 | End: 2025-01-10 | Stop reason: HOSPADM

## 2025-01-07 RX ORDER — HYDROMORPHONE HCL/PF 1 MG/ML
0.5 SYRINGE (ML) INJECTION EVERY 6 HOURS PRN
Status: DISCONTINUED | OUTPATIENT
Start: 2025-01-07 | End: 2025-01-07

## 2025-01-07 RX ORDER — HYDROMORPHONE HCL/PF 1 MG/ML
1 SYRINGE (ML) INJECTION ONCE
Refills: 0 | Status: COMPLETED | OUTPATIENT
Start: 2025-01-07 | End: 2025-01-07

## 2025-01-07 RX ORDER — METOCLOPRAMIDE HYDROCHLORIDE 5 MG/ML
10 INJECTION INTRAMUSCULAR; INTRAVENOUS ONCE
Status: COMPLETED | OUTPATIENT
Start: 2025-01-07 | End: 2025-01-07

## 2025-01-07 RX ORDER — GABAPENTIN 300 MG/1
300 CAPSULE ORAL 2 TIMES DAILY
Status: DISCONTINUED | OUTPATIENT
Start: 2025-01-08 | End: 2025-01-10 | Stop reason: HOSPADM

## 2025-01-07 RX ORDER — FLUTICASONE PROPIONATE 50 MCG
1 SPRAY, SUSPENSION (ML) NASAL DAILY
Status: DISCONTINUED | OUTPATIENT
Start: 2025-01-08 | End: 2025-01-10 | Stop reason: HOSPADM

## 2025-01-07 RX ORDER — HYDROMORPHONE HCL/PF 1 MG/ML
1 SYRINGE (ML) INJECTION EVERY 6 HOURS PRN
Status: DISCONTINUED | OUTPATIENT
Start: 2025-01-07 | End: 2025-01-10 | Stop reason: HOSPADM

## 2025-01-07 RX ORDER — ONDANSETRON 2 MG/ML
4 INJECTION INTRAMUSCULAR; INTRAVENOUS ONCE
Status: COMPLETED | OUTPATIENT
Start: 2025-01-07 | End: 2025-01-07

## 2025-01-07 RX ORDER — SODIUM CHLORIDE, SODIUM LACTATE, POTASSIUM CHLORIDE, CALCIUM CHLORIDE 600; 310; 30; 20 MG/100ML; MG/100ML; MG/100ML; MG/100ML
125 INJECTION, SOLUTION INTRAVENOUS CONTINUOUS
Status: DISCONTINUED | OUTPATIENT
Start: 2025-01-07 | End: 2025-01-10 | Stop reason: HOSPADM

## 2025-01-07 RX ORDER — ACETAMINOPHEN 325 MG/1
650 TABLET ORAL EVERY 6 HOURS PRN
Status: DISCONTINUED | OUTPATIENT
Start: 2025-01-07 | End: 2025-01-10 | Stop reason: HOSPADM

## 2025-01-07 RX ORDER — LORATADINE 10 MG/1
10 TABLET ORAL DAILY
Status: DISCONTINUED | OUTPATIENT
Start: 2025-01-08 | End: 2025-01-10 | Stop reason: HOSPADM

## 2025-01-07 RX ORDER — OXYCODONE HYDROCHLORIDE 5 MG/1
5 TABLET ORAL EVERY 6 HOURS PRN
Refills: 0 | Status: DISCONTINUED | OUTPATIENT
Start: 2025-01-07 | End: 2025-01-10 | Stop reason: HOSPADM

## 2025-01-07 RX ORDER — PANTOPRAZOLE SODIUM 40 MG/1
40 TABLET, DELAYED RELEASE ORAL DAILY
Status: DISCONTINUED | OUTPATIENT
Start: 2025-01-08 | End: 2025-01-10 | Stop reason: HOSPADM

## 2025-01-07 RX ADMIN — METOCLOPRAMIDE 10 MG: 5 INJECTION, SOLUTION INTRAMUSCULAR; INTRAVENOUS at 22:25

## 2025-01-07 RX ADMIN — ONDANSETRON 4 MG: 2 INJECTION INTRAMUSCULAR; INTRAVENOUS at 21:22

## 2025-01-07 RX ADMIN — KETOROLAC TROMETHAMINE 15 MG: 30 INJECTION, SOLUTION INTRAMUSCULAR at 21:23

## 2025-01-07 RX ADMIN — HYDROMORPHONE HYDROCHLORIDE 1 MG: 1 INJECTION, SOLUTION INTRAMUSCULAR; INTRAVENOUS; SUBCUTANEOUS at 21:23

## 2025-01-07 RX ADMIN — DIPHENHYDRAMINE HYDROCHLORIDE 25 MG: 50 INJECTION, SOLUTION INTRAMUSCULAR; INTRAVENOUS at 22:25

## 2025-01-07 RX ADMIN — SODIUM CHLORIDE, SODIUM LACTATE, POTASSIUM CHLORIDE, AND CALCIUM CHLORIDE 125 ML/HR: .6; .31; .03; .02 INJECTION, SOLUTION INTRAVENOUS at 23:27

## 2025-01-07 RX ADMIN — HYDROMORPHONE HYDROCHLORIDE 1 MG: 1 INJECTION, SOLUTION INTRAMUSCULAR; INTRAVENOUS; SUBCUTANEOUS at 22:41

## 2025-01-08 ENCOUNTER — ANESTHESIA (OUTPATIENT)
Dept: PERIOP | Facility: HOSPITAL | Age: 50
DRG: 660 | End: 2025-01-08
Payer: COMMERCIAL

## 2025-01-08 ENCOUNTER — TELEPHONE (OUTPATIENT)
Dept: UROLOGY | Facility: CLINIC | Age: 50
End: 2025-01-08

## 2025-01-08 ENCOUNTER — ANESTHESIA EVENT (OUTPATIENT)
Dept: PERIOP | Facility: HOSPITAL | Age: 50
DRG: 660 | End: 2025-01-08
Payer: COMMERCIAL

## 2025-01-08 ENCOUNTER — APPOINTMENT (OUTPATIENT)
Dept: RADIOLOGY | Facility: HOSPITAL | Age: 50
DRG: 660 | End: 2025-01-08
Payer: COMMERCIAL

## 2025-01-08 DIAGNOSIS — R39.9 UTI SYMPTOMS: Primary | ICD-10-CM

## 2025-01-08 LAB
ANION GAP SERPL CALCULATED.3IONS-SCNC: 3 MMOL/L (ref 4–13)
BUN SERPL-MCNC: 14 MG/DL (ref 5–25)
CALCIUM SERPL-MCNC: 9.5 MG/DL (ref 8.4–10.2)
CHLORIDE SERPL-SCNC: 109 MMOL/L (ref 96–108)
CO2 SERPL-SCNC: 28 MMOL/L (ref 21–32)
CREAT SERPL-MCNC: 0.9 MG/DL (ref 0.6–1.3)
ERYTHROCYTE [DISTWIDTH] IN BLOOD BY AUTOMATED COUNT: 12.6 % (ref 11.6–15.1)
GFR SERPL CREATININE-BSD FRML MDRD: 98 ML/MIN/1.73SQ M
GLUCOSE SERPL-MCNC: 102 MG/DL (ref 65–140)
HCT VFR BLD AUTO: 42.9 % (ref 36.5–49.3)
HGB BLD-MCNC: 14 G/DL (ref 12–17)
MCH RBC QN AUTO: 29.1 PG (ref 26.8–34.3)
MCHC RBC AUTO-ENTMCNC: 32.6 G/DL (ref 31.4–37.4)
MCV RBC AUTO: 89 FL (ref 82–98)
PLATELET # BLD AUTO: 205 THOUSANDS/UL (ref 149–390)
PMV BLD AUTO: 9.9 FL (ref 8.9–12.7)
POTASSIUM SERPL-SCNC: 4.1 MMOL/L (ref 3.5–5.3)
RBC # BLD AUTO: 4.81 MILLION/UL (ref 3.88–5.62)
SODIUM SERPL-SCNC: 140 MMOL/L (ref 135–147)
WBC # BLD AUTO: 8.82 THOUSAND/UL (ref 4.31–10.16)

## 2025-01-08 PROCEDURE — 74420 UROGRAPHY RTRGR +-KUB: CPT

## 2025-01-08 PROCEDURE — C1747 URETEROSCOPE DIGITAL FLEX SNGL USE STD DEFLECTION APTRA: HCPCS | Performed by: UROLOGY

## 2025-01-08 PROCEDURE — C1758 CATHETER, URETERAL: HCPCS | Performed by: UROLOGY

## 2025-01-08 PROCEDURE — 80048 BASIC METABOLIC PNL TOTAL CA: CPT

## 2025-01-08 PROCEDURE — 82360 CALCULUS ASSAY QUANT: CPT | Performed by: UROLOGY

## 2025-01-08 PROCEDURE — 99205 OFFICE O/P NEW HI 60 MIN: CPT | Performed by: UROLOGY

## 2025-01-08 PROCEDURE — C1894 INTRO/SHEATH, NON-LASER: HCPCS | Performed by: UROLOGY

## 2025-01-08 PROCEDURE — 99233 SBSQ HOSP IP/OBS HIGH 50: CPT | Performed by: FAMILY MEDICINE

## 2025-01-08 PROCEDURE — 0TC78ZZ EXTIRPATION OF MATTER FROM LEFT URETER, VIA NATURAL OR ARTIFICIAL OPENING ENDOSCOPIC: ICD-10-PCS | Performed by: UROLOGY

## 2025-01-08 PROCEDURE — 0T778DZ DILATION OF LEFT URETER WITH INTRALUMINAL DEVICE, VIA NATURAL OR ARTIFICIAL OPENING ENDOSCOPIC: ICD-10-PCS | Performed by: UROLOGY

## 2025-01-08 PROCEDURE — 85027 COMPLETE CBC AUTOMATED: CPT

## 2025-01-08 PROCEDURE — 52356 CYSTO/URETERO W/LITHOTRIPSY: CPT | Performed by: UROLOGY

## 2025-01-08 PROCEDURE — C2625 STENT, NON-COR, TEM W/DEL SY: HCPCS | Performed by: UROLOGY

## 2025-01-08 PROCEDURE — C1769 GUIDE WIRE: HCPCS | Performed by: UROLOGY

## 2025-01-08 PROCEDURE — BT1F1ZZ FLUOROSCOPY OF LEFT KIDNEY, URETER AND BLADDER USING LOW OSMOLAR CONTRAST: ICD-10-PCS | Performed by: UROLOGY

## 2025-01-08 DEVICE — INLAY OPTIMA URETERAL STENT W/O GUIDEWIRE
Type: IMPLANTABLE DEVICE | Site: URETER | Status: FUNCTIONAL
Brand: BARD® INLAY OPTIMA® URETERAL STENT

## 2025-01-08 RX ORDER — MEPERIDINE HYDROCHLORIDE 50 MG/ML
12.5 INJECTION INTRAMUSCULAR; INTRAVENOUS; SUBCUTANEOUS ONCE
Status: COMPLETED | OUTPATIENT
Start: 2025-01-08 | End: 2025-01-08

## 2025-01-08 RX ORDER — FENTANYL CITRATE/PF 50 MCG/ML
50 SYRINGE (ML) INJECTION
Status: DISCONTINUED | OUTPATIENT
Start: 2025-01-08 | End: 2025-01-08 | Stop reason: HOSPADM

## 2025-01-08 RX ORDER — FUROSEMIDE 10 MG/ML
INJECTION INTRAMUSCULAR; INTRAVENOUS AS NEEDED
Status: DISCONTINUED | OUTPATIENT
Start: 2025-01-08 | End: 2025-01-08

## 2025-01-08 RX ORDER — SULFAMETHOXAZOLE AND TRIMETHOPRIM 800; 160 MG/1; MG/1
1 TABLET ORAL EVERY 12 HOURS SCHEDULED
Qty: 6 TABLET | Refills: 0 | Status: SHIPPED | OUTPATIENT
Start: 2025-01-08 | End: 2025-01-11

## 2025-01-08 RX ORDER — SODIUM CHLORIDE, SODIUM LACTATE, POTASSIUM CHLORIDE, CALCIUM CHLORIDE 600; 310; 30; 20 MG/100ML; MG/100ML; MG/100ML; MG/100ML
INJECTION, SOLUTION INTRAVENOUS CONTINUOUS PRN
Status: DISCONTINUED | OUTPATIENT
Start: 2025-01-08 | End: 2025-01-08

## 2025-01-08 RX ORDER — TAMSULOSIN HYDROCHLORIDE 0.4 MG/1
0.4 CAPSULE ORAL
Qty: 30 CAPSULE | Refills: 0 | Status: SHIPPED | OUTPATIENT
Start: 2025-01-08 | End: 2025-01-17

## 2025-01-08 RX ORDER — ACETAMINOPHEN 500 MG
500 TABLET ORAL EVERY 6 HOURS
Qty: 20 TABLET | Refills: 0 | Status: SHIPPED | OUTPATIENT
Start: 2025-01-08 | End: 2025-01-10

## 2025-01-08 RX ORDER — PROPOFOL 10 MG/ML
INJECTION, EMULSION INTRAVENOUS AS NEEDED
Status: DISCONTINUED | OUTPATIENT
Start: 2025-01-08 | End: 2025-01-08

## 2025-01-08 RX ORDER — MIDAZOLAM HYDROCHLORIDE 2 MG/2ML
INJECTION, SOLUTION INTRAMUSCULAR; INTRAVENOUS AS NEEDED
Status: DISCONTINUED | OUTPATIENT
Start: 2025-01-08 | End: 2025-01-08

## 2025-01-08 RX ORDER — FENTANYL CITRATE 50 UG/ML
INJECTION, SOLUTION INTRAMUSCULAR; INTRAVENOUS AS NEEDED
Status: DISCONTINUED | OUTPATIENT
Start: 2025-01-08 | End: 2025-01-08

## 2025-01-08 RX ORDER — LIDOCAINE HYDROCHLORIDE 10 MG/ML
INJECTION, SOLUTION EPIDURAL; INFILTRATION; INTRACAUDAL; PERINEURAL AS NEEDED
Status: DISCONTINUED | OUTPATIENT
Start: 2025-01-08 | End: 2025-01-08

## 2025-01-08 RX ORDER — PHENAZOPYRIDINE HYDROCHLORIDE 200 MG/1
200 TABLET, FILM COATED ORAL
Qty: 6 TABLET | Refills: 0 | Status: SHIPPED | OUTPATIENT
Start: 2025-01-08 | End: 2025-01-10

## 2025-01-08 RX ORDER — ONDANSETRON 2 MG/ML
INJECTION INTRAMUSCULAR; INTRAVENOUS AS NEEDED
Status: DISCONTINUED | OUTPATIENT
Start: 2025-01-08 | End: 2025-01-08

## 2025-01-08 RX ORDER — MAGNESIUM HYDROXIDE 1200 MG/15ML
LIQUID ORAL AS NEEDED
Status: DISCONTINUED | OUTPATIENT
Start: 2025-01-08 | End: 2025-01-08 | Stop reason: HOSPADM

## 2025-01-08 RX ORDER — DEXAMETHASONE SODIUM PHOSPHATE 10 MG/ML
INJECTION, SOLUTION INTRAMUSCULAR; INTRAVENOUS AS NEEDED
Status: DISCONTINUED | OUTPATIENT
Start: 2025-01-08 | End: 2025-01-08

## 2025-01-08 RX ORDER — CEFAZOLIN SODIUM 2 G/50ML
SOLUTION INTRAVENOUS AS NEEDED
Status: DISCONTINUED | OUTPATIENT
Start: 2025-01-08 | End: 2025-01-08

## 2025-01-08 RX ORDER — DICLOFENAC POTASSIUM 50 MG/1
50 TABLET, FILM COATED ORAL 2 TIMES DAILY
Qty: 10 TABLET | Refills: 0 | Status: SHIPPED | OUTPATIENT
Start: 2025-01-08 | End: 2025-01-17

## 2025-01-08 RX ADMIN — SODIUM CHLORIDE, SODIUM LACTATE, POTASSIUM CHLORIDE, AND CALCIUM CHLORIDE 125 ML/HR: .6; .31; .03; .02 INJECTION, SOLUTION INTRAVENOUS at 08:13

## 2025-01-08 RX ADMIN — PROPOFOL 300 MG: 10 INJECTION, EMULSION INTRAVENOUS at 12:39

## 2025-01-08 RX ADMIN — FUROSEMIDE 20 MG: 10 INJECTION, SOLUTION INTRAMUSCULAR; INTRAVENOUS at 13:09

## 2025-01-08 RX ADMIN — LORATADINE 10 MG: 10 TABLET ORAL at 08:14

## 2025-01-08 RX ADMIN — HYDROMORPHONE HYDROCHLORIDE 1 MG: 1 INJECTION, SOLUTION INTRAMUSCULAR; INTRAVENOUS; SUBCUTANEOUS at 22:03

## 2025-01-08 RX ADMIN — SODIUM CHLORIDE, SODIUM LACTATE, POTASSIUM CHLORIDE, AND CALCIUM CHLORIDE 125 ML/HR: .6; .31; .03; .02 INJECTION, SOLUTION INTRAVENOUS at 22:59

## 2025-01-08 RX ADMIN — MEPERIDINE HYDROCHLORIDE 12.5 MG: 50 INJECTION INTRAMUSCULAR; INTRAVENOUS; SUBCUTANEOUS at 13:52

## 2025-01-08 RX ADMIN — PANTOPRAZOLE SODIUM 40 MG: 40 TABLET, DELAYED RELEASE ORAL at 08:14

## 2025-01-08 RX ADMIN — ONDANSETRON 4 MG: 2 INJECTION INTRAMUSCULAR; INTRAVENOUS at 12:53

## 2025-01-08 RX ADMIN — MIDAZOLAM HYDROCHLORIDE 2 MG: 1 INJECTION, SOLUTION INTRAMUSCULAR; INTRAVENOUS at 12:35

## 2025-01-08 RX ADMIN — HYDROMORPHONE HYDROCHLORIDE 1 MG: 1 INJECTION, SOLUTION INTRAMUSCULAR; INTRAVENOUS; SUBCUTANEOUS at 15:38

## 2025-01-08 RX ADMIN — FENTANYL CITRATE 100 MCG: 50 INJECTION, SOLUTION INTRAMUSCULAR; INTRAVENOUS at 12:39

## 2025-01-08 RX ADMIN — OXYCODONE HYDROCHLORIDE 5 MG: 5 TABLET ORAL at 17:24

## 2025-01-08 RX ADMIN — CEFAZOLIN SODIUM 2000 MG: 2 SOLUTION INTRAVENOUS at 12:40

## 2025-01-08 RX ADMIN — LIDOCAINE HYDROCHLORIDE 60 MG: 10 INJECTION, SOLUTION EPIDURAL; INFILTRATION; INTRACAUDAL; PERINEURAL at 12:35

## 2025-01-08 RX ADMIN — DEXAMETHASONE SODIUM PHOSPHATE 5 MG: 10 INJECTION INTRAMUSCULAR; INTRAVENOUS at 12:53

## 2025-01-08 RX ADMIN — FENTANYL CITRATE 50 MCG: 50 INJECTION, SOLUTION INTRAMUSCULAR; INTRAVENOUS at 13:04

## 2025-01-08 RX ADMIN — SODIUM CHLORIDE, SODIUM LACTATE, POTASSIUM CHLORIDE, AND CALCIUM CHLORIDE: .6; .31; .03; .02 INJECTION, SOLUTION INTRAVENOUS at 12:35

## 2025-01-08 RX ADMIN — GABAPENTIN 300 MG: 300 CAPSULE ORAL at 08:14

## 2025-01-08 RX ADMIN — ACETAMINOPHEN 650 MG: 325 TABLET, FILM COATED ORAL at 08:14

## 2025-01-08 NOTE — ASSESSMENT & PLAN NOTE
BMI 41.32  Carb controlled diet when patient is not n.p.o. for procedure  Recommend exercise and healthy diet

## 2025-01-08 NOTE — ASSESSMENT & PLAN NOTE
"Presents to ED complaining of left-sided back and abdominal pain starting at 7 PM this evening  Admits to associated nausea, vomiting and chills, patient unable to control n/v and pain at home  CT abdomen pelvis without contrast revealing \"obstructing 10 x 7 mm left UPJ calculus with mild upstream hydronephrosis and mild perinephric stranding. Nonobstructing 6 mm left lower pole renal calculus.\"  UA with small blood, microscopic 10-20 RBC, 2-4 WBC, occasional calcium oxalate crystals, occasional mucus threads  Per ED provider, patient cleared with urology for admission at [place], n.p.o. midnight  125 cc/h IVF hydration initiated  Oxycodone 2.5/5 mg for moderate/severe pain, Dilaudid 1 mg IV for breakthrough pain  Zofran for nausea  Strain urine  Urology consulted, appreciate their recommendations  "

## 2025-01-08 NOTE — H&P
"H&P - Hospitalist   Name: Antonio Garrison 49 y.o. male I MRN: [id number]  Unit/Bed#: ED 25 I Date of Admission: 1/7/2025   Date of Service: 1/7/2025 I Hospital Day: 0     Assessment & Plan  Ureterolithiasis  Presents to ED complaining of left-sided back and abdominal pain starting at 7 PM this evening  Admits to associated nausea, vomiting and chills, patient unable to control n/v and pain at home  CT abdomen pelvis without contrast revealing \"obstructing 10 x 7 mm left UPJ calculus with mild upstream hydronephrosis and mild perinephric stranding. Nonobstructing 6 mm left lower pole renal calculus.\"  UA with small blood, microscopic 10-20 RBC, 2-4 WBC, occasional calcium oxalate crystals, occasional mucus threads  Per ED provider, patient cleared with urology for admission at [facility], n.p.o. midnight  125 cc/h IVF hydration initiated  Oxycodone 2.5/5 mg for moderate/severe pain, Dilaudid 1 mg IV for breakthrough pain  Zofran for nausea  Strain urine  Urology consulted, appreciate their recommendations  GERD (gastroesophageal reflux disease)  Continue PTA Protonix  Allergic rhinitis  Continue PTA Flonase and cetirizine  Obesity, Class III, BMI 40-49.9 (morbid obesity) (Formerly [facility])  BMI 41.32  Carb controlled diet when patient is not n.p.o. for procedure  Recommend exercise and healthy diet      VTE Pharmacologic Prophylaxis: VTE Score: 3 Moderate Risk (Score 3-4) - Pharmacological DVT Prophylaxis Ordered: not ordered in setting of possible urology procedure tomorrow.  Code Status: Level 1 - Full Code   Discussion with family: Patient declined call to .     Anticipated Length of Stay: Patient will be admitted on an observation basis with an anticipated length of stay of less than 2 midnights secondary to pain and nausea control, pending urology evaluation.    History of Present Illness   Chief Complaint:   Chief Complaint   Patient presents with    Flank Pain     L flank pain, has known kidney stones. Pain " started 1 hr ago         Antonio Garrison is a 49 y.o. male with a PMH of GERD, allergic rhinitis, obesity who presents with left flank pain.  Patient states his pain began this evening around 7 PM.  Also reports left lower abdominal pain.  Reports the pain is constant in nature.  Denies any alleviating or aggravating factors.  Currently rates pain 4/10 and states pain medication he received in ED has helped.  Additionally reports nausea, vomiting and chills.  Admits to blood in urine.  Denies any blood in vomit.  Denies fever, chest pain, shortness of breath.    Review of Systems   Constitutional:  Positive for chills. Negative for fever.   Respiratory:  Negative for chest tightness and shortness of breath.    Cardiovascular:  Negative for chest pain, palpitations and leg swelling.   Gastrointestinal:  Positive for abdominal pain, nausea and vomiting. Negative for diarrhea.   Genitourinary:  Positive for flank pain and hematuria. Negative for dysuria, frequency and urgency.   Musculoskeletal:  Positive for back pain.   Neurological:  Negative for dizziness and headaches.       Historical Information   Past Medical History:   Diagnosis Date    Anxiety and depression     Colon polyp     Depression     Esophageal reflux     Fracture of both hands     Hair loss     Heartburn     Hiatal hernia     Resolved 7/24/2015     Night sweats     Sleep disturbance     Starting and stopping of urinary stream during micturition     Stomach problems     Weight gain      Past Surgical History:   Procedure Laterality Date    APPENDECTOMY  1984    COLONOSCOPY      ESOPHAGOGASTRODUODENOSCOPY      Diagnostic     HAND SURGERY      Right in 1998 and left in 1991     NOSE SURGERY      Nasal septal deviation repair      Social History     Tobacco Use    Smoking status: Former     Current packs/day: 1.00     Average packs/day: 1 pack/day for 25.0 years (25.0 ttl pk-yrs)     Types: Cigarettes    Smokeless tobacco: Never   Vaping Use    Vaping  status: Never Used   Substance and Sexual Activity    Alcohol use: Yes     Comment: weekends    Drug use: Not Currently     Comment: Quit - As per [employer]     Sexual activity: Yes     E-Cigarette/Vaping    E-Cigarette Use Never User      E-Cigarette/Vaping Substances    Nicotine No     THC No     CBD No     Flavoring No     Other No     Unknown No      Family History   Problem Relation Age of Onset    No Known Problems Mother     Diabetes Father     Diabetes Maternal Uncle     Heart disease Maternal Grandmother     Diabetes Maternal Grandmother     Pancreatic cancer Maternal Grandmother     Colon cancer Maternal Grandfather     Dementia Paternal Grandmother      Social History:  Marital Status: /Civil Union   Occupation:   Patient Pre-hospital Living Situation: Home  Patient Pre-hospital Level of Mobility: unable to be assessed at time of evaluation  Patient Pre-hospital Diet Restrictions:     Meds/Allergies   I have reviewed home medications using recent Epic encounter.  Prior to Admission medications    Medication Sig Start Date End Date Taking? Authorizing Provider   cetirizine (ZyrTEC) 10 mg tablet Take 1 tablet (10 mg total) by mouth daily 5/29/24   Kimberly Dinh MD   fluticasone (FLONASE) 50 mcg/act nasal spray 1 spray into each nostril daily 5/29/24   Kimberly Dinh MD   gabapentin (NEURONTIN) 300 mg capsule Take 1 capsule (300 mg total) by mouth 2 (two) times a day 8/8/24   Rocky Isaacs DO   meloxicam (MOBIC) 15 mg tablet Take 1 tablet (15 mg total) by mouth in the morning 5/29/24   Kimberly Dinh MD   pantoprazole (PROTONIX) 40 mg tablet TAKE ONE TABLET BY MOUTH ONE TIME DAILY 11/15/24   Kaylie Barrios PA-C     Allergies   Allergen Reactions    Tramadol Hives    Clindamycin Rash     Category: Allergy;        Objective :  Temp:  [97.2 °F (36.2 °C)] 97.2 °F (36.2 °C)  HR:  [78-85] 78  BP: (117-161)/(56-76) 117/56  Resp:  [16-18] 16  SpO2:  [92 %-95 %] 92 %  O2 Device: None  (Room air)    Physical Exam  Vitals reviewed.   Constitutional:       General: He is not in acute distress.     Appearance: He is not ill-appearing, toxic-appearing or diaphoretic.   Cardiovascular:      Rate and Rhythm: Normal rate and regular rhythm.   Pulmonary:      Effort: Pulmonary effort is normal. No respiratory distress.      Breath sounds: No wheezing, rhonchi or rales.   Abdominal:      General: Bowel sounds are normal. There is no distension.      Palpations: Abdomen is soft.      Tenderness: There is abdominal tenderness (TTP LLQ). There is left CVA tenderness. There is no right CVA tenderness, guarding or rebound.   Musculoskeletal:         General: No swelling.      Right lower leg: No edema.      Left lower leg: No edema.   Skin:     General: Skin is warm and dry.   Neurological:      Mental Status: He is alert.         Lab Results: I have reviewed the following results:  Results from last 7 days   Lab Units 01/07/25  2131   WBC Thousand/uL 7.90   HEMOGLOBIN g/dL 14.9   HEMATOCRIT % 44.0   PLATELETS Thousands/uL 230   SEGS PCT % 54   LYMPHO PCT % 30   MONO PCT % 10   EOS PCT % 5     Results from last 7 days   Lab Units 01/07/25  2131   SODIUM mmol/L 138   POTASSIUM mmol/L 3.8   CHLORIDE mmol/L 107   CO2 mmol/L 25   BUN mg/dL 14   CREATININE mg/dL 0.95   ANION GAP mmol/L 6   CALCIUM mg/dL 10.0   ALBUMIN g/dL 4.1   TOTAL BILIRUBIN mg/dL 0.48   ALK PHOS U/L 73   ALT U/L 38   AST U/L 27   GLUCOSE RANDOM mg/dL 161*             Lab Results   Component Value Date    HGBA1C 5.4 08/17/2024    HGBA1C 5.1 04/07/2022    HGBA1C 5.0 08/19/2021           Imaging Results Review: I reviewed radiology reports from this admission including: CT abdomen/pelvis.  Other Study Results Review: No additional pertinent studies reviewed.    Administrative Statements   I have spent a total time of 45 minutes in caring for this patient on the day of the visit/encounter including Diagnostic results, Instructions for management,  Impressions, Counseling / Coordination of care, Documenting in the medical record, Reviewing / ordering tests, medicine, procedures  , Obtaining or reviewing history  , and Communicating with other healthcare professionals .    ** Please Note: This note has been constructed using a voice recognition system. **

## 2025-01-08 NOTE — ED PROVIDER NOTES
Time reflects when diagnosis was documented in both MDM as applicable and the Disposition within this note       Time User Action Codes Description Comment    1/7/2025 10:43 PM Dimple Sauceda [N20.1] Ureterolithiasis     1/7/2025 10:48 PM Dimple Sauceda [R52] Intractable pain           ED Disposition       ED Disposition   Admit    Condition   Stable    Date/Time   Tue Jan 7, 2025 10:50 PM    Comment   Case was discussed with [place] and the patient's admission status was agreed to be Admission Status: observation status to the service of Dr. Bruce .               Assessment & Plan       Medical Decision Making  Amount and/or Complexity of Data Reviewed  Labs: ordered.  Radiology: ordered.    Risk  Prescription drug management.  Decision regarding hospitalization.    +large obstructing L UPJ stone, no signs of COTY or infection. Difficult to control pain/vomiting, I discussed with urology, OK to admit at [facility], NPO at midnight.         Medications   ketorolac (TORADOL) injection 15 mg (15 mg Intravenous Given 1/7/25 2123)   ondansetron (ZOFRAN) injection 4 mg (4 mg Intravenous Given 1/7/25 2122)   HYDROmorphone (DILAUDID) injection 1 mg (1 mg Intravenous Given 1/7/25 2123)   metoclopramide (REGLAN) injection 10 mg (10 mg Intravenous Given 1/7/25 2225)   diphenhydrAMINE (BENADRYL) injection 25 mg (25 mg Intravenous Given 1/7/25 2225)   HYDROmorphone (DILAUDID) injection 1 mg (1 mg Intravenous Given 1/7/25 2241)       ED Risk Strat Scores                          SBIRT 22yo+      Flowsheet Row Most Recent Value   Initial Alcohol Screen: US AUDIT-C     1. How often do you have a drink containing alcohol? 0 Filed at: 01/07/2025 2139   2. How many drinks containing alcohol do you have on a typical day you are drinking?  0 Filed at: 01/07/2025 2139   3a. Male UNDER 65: How often do you have five or more drinks on one occasion? 0 Filed at: 01/07/2025 2139   Audit-C Score 0 Filed at: 01/07/2025 2139   SHONA: How many times  in the past year have you...    Used an illegal drug or used a prescription medication for non-medical reasons? Never Filed at: 01/07/2025 3122                            History of Present Illness       Chief Complaint   Patient presents with    Flank Pain     L flank pain, has known kidney stones. Pain started 1 hr ago        Past Medical History:   Diagnosis Date    Anxiety and depression     Colon polyp     Depression     Esophageal reflux     Fracture of both hands     Hair loss     Heartburn     Hiatal hernia     Resolved 7/24/2015     Night sweats     Sleep disturbance     Starting and stopping of urinary stream during micturition     Stomach problems     Weight gain       Past Surgical History:   Procedure Laterality Date    APPENDECTOMY  1984    COLONOSCOPY      ESOPHAGOGASTRODUODENOSCOPY      Diagnostic     HAND SURGERY      Right in 1998 and left in 1991     NOSE SURGERY      Nasal septal deviation repair       Family History   Problem Relation Age of Onset    No Known Problems Mother     Diabetes Father     Diabetes Maternal Uncle     Heart disease Maternal Grandmother     Diabetes Maternal Grandmother     Pancreatic cancer Maternal Grandmother     Colon cancer Maternal Grandfather     Dementia Paternal Grandmother       Social History     Tobacco Use    Smoking status: Former     Current packs/day: 1.00     Average packs/day: 1 pack/day for 25.0 years (25.0 ttl pk-yrs)     Types: Cigarettes    Smokeless tobacco: Never   Vaping Use    Vaping status: Never Used   Substance Use Topics    Alcohol use: Yes     Comment: weekends    Drug use: Not Currently     Comment: Quit - As per [employer]       E-Cigarette/Vaping    E-Cigarette Use Never User       E-Cigarette/Vaping Substances    Nicotine No     THC No     CBD No     Flavoring No     Other No     Unknown No       I have reviewed and agree with the history as documented.     HPI  50 yo M presents with L flank pain that started about one hour prior to  arrival. Reports associated nausea and vomiting. Has not been able to urinate since symptoms started. Has history of kidney stones.  Review of Systems   Constitutional:  Negative for chills and fever.   HENT:  Negative for dental problem and ear pain.    Eyes:  Negative for pain and redness.   Respiratory:  Negative for cough and shortness of breath.    Cardiovascular:  Negative for chest pain and palpitations.   Gastrointestinal:  Positive for nausea. Negative for abdominal pain.   Endocrine: Negative for polydipsia and polyphagia.   Genitourinary:  Positive for flank pain. Negative for dysuria and frequency.   Musculoskeletal:  Negative for arthralgias and joint swelling.   Skin:  Negative for color change and rash.   Neurological:  Negative for dizziness and headaches.   Psychiatric/Behavioral:  Negative for behavioral problems and confusion.    All other systems reviewed and are negative.          Objective       ED Triage Vitals   Temperature Pulse Blood Pressure Respirations SpO2 Patient Position - Orthostatic VS   01/07/25 2054 01/07/25 2054 01/07/25 2054 01/07/25 2054 01/07/25 2054 01/07/25 2054   (!) 97.2 °F (36.2 °C) 85 161/76 18 95 % Sitting      Temp Source Heart Rate Source BP Location FiO2 (%) Pain Score    01/07/25 2054 01/07/25 2054 01/07/25 2054 -- 01/07/25 2123    Oral Monitor Left arm  9      Vitals      Date and Time Temp Pulse SpO2 Resp BP Pain Score FACES Pain Rating User   01/07/25 2241 -- -- -- -- -- 5 --    01/07/25 2123 -- -- -- -- -- 9 --    01/07/25 2054 97.2 °F (36.2 °C) 85 95 % 18 161/76 -- -- AM            Physical Exam  Vitals and nursing note reviewed.   Constitutional:       General: He is not in acute distress.     Appearance: He is well-developed. He is not diaphoretic.   HENT:      Head: Atraumatic.      Right Ear: External ear normal.      Left Ear: External ear normal.      Nose: Nose normal.   Eyes:      Conjunctiva/sclera: Conjunctivae normal.      Pupils: Pupils are  equal, round, and reactive to light.   Neck:      Vascular: No JVD.   Cardiovascular:      Rate and Rhythm: Normal rate and regular rhythm.      Heart sounds: Normal heart sounds. No murmur heard.  Pulmonary:      Effort: Pulmonary effort is normal. No respiratory distress.      Breath sounds: Normal breath sounds. No wheezing.   Abdominal:      General: Bowel sounds are normal. There is no distension.      Palpations: Abdomen is soft.      Tenderness: There is no abdominal tenderness.   Musculoskeletal:         General: Normal range of motion.      Cervical back: Normal range of motion and neck supple.   Skin:     General: Skin is warm and dry.      Capillary Refill: Capillary refill takes less than 2 seconds.   Neurological:      Mental Status: He is alert and oriented to person, place, and time.      Cranial Nerves: No cranial nerve deficit.   Psychiatric:         Behavior: Behavior normal.         Results Reviewed       Procedure Component Value Units Date/Time    Urine Microscopic [[id number]]  (Abnormal) Collected: 01/07/25 2222    Lab Status: Final result Specimen: Urine, Clean Catch Updated: 01/07/25 2230     RBC, UA 10-20 /hpf      WBC, UA 2-4 /hpf      Epithelial Cells Occasional /hpf      Bacteria, UA None Seen /hpf      MUCUS THREADS Occasional     Ca Oxalate Rae, UA Occasional /hpf     UA w Reflex to Microscopic w Reflex to Culture [[id number]]  (Abnormal) Collected: 01/07/25 2222    Lab Status: Final result Specimen: Urine, Clean Catch Updated: 01/07/25 2228     Color, UA Yellow     Clarity, UA Clear     Specific Gravity, UA 1.025     pH, UA 5.5     Leukocytes, UA Negative     Nitrite, UA Negative     Protein, UA Negative mg/dl      Glucose, UA Negative mg/dl      Ketones, UA Negative mg/dl      Urobilinogen, UA <2.0 mg/dl      Bilirubin, UA Negative     Occult Blood, UA Small    Comprehensive metabolic panel [[id number]]  (Abnormal) Collected: 01/07/25 2131    Lab Status: Final result Specimen: Blood  from Hand, Left Updated: 01/07/25 2201     Sodium 138 mmol/L      Potassium 3.8 mmol/L      Chloride 107 mmol/L      CO2 25 mmol/L      ANION GAP 6 mmol/L      BUN 14 mg/dL      Creatinine 0.95 mg/dL      Glucose 161 mg/dL      Calcium 10.0 mg/dL      AST 27 U/L      ALT 38 U/L      Alkaline Phosphatase 73 U/L      Total Protein 6.0 g/dL      Albumin 4.1 g/dL      Total Bilirubin 0.48 mg/dL      eGFR 92 ml/min/1.73sq m     Narrative:      National Kid[employer] Disease Foundation guidelines for Chronic Kidney Disease (CKD):     Stage 1 with normal or high GFR (GFR > 90 mL/min/1.73 square meters)    Stage 2 Mild CKD (GFR = 60-89 mL/min/1.73 square meters)    Stage 3A Moderate CKD (GFR = 45-59 mL/min/1.73 square meters)    Stage 3B Moderate CKD (GFR = 30-44 mL/min/1.73 square meters)    Stage 4 Severe CKD (GFR = 15-29 mL/min/1.73 square meters)    Stage 5 End Stage CKD (GFR <15 mL/min/1.73 square meters)  Note: GFR calculation is accurate only with a steady state creatinine    CBC and differential [[id number]] Collected: 01/07/25 2131    Lab Status: Final result Specimen: Blood from Hand, Left Updated: 01/07/25 2136     WBC 7.90 Thousand/uL      RBC 5.06 Million/uL      Hemoglobin 14.9 g/dL      Hematocrit 44.0 %      MCV 87 fL      MCH 29.4 pg      MCHC 33.9 g/dL      RDW 12.7 %      MPV 9.9 fL      Platelets 230 Thousands/uL      nRBC 0 /100 WBCs      Segmented % 54 %      Immature Grans % 0 %      Lymphocytes % 30 %      Monocytes % 10 %      Eosinophils Relative 5 %      Basophils Relative 1 %      Absolute Neutrophils 4.29 Thousands/µL      Absolute Immature Grans 0.02 Thousand/uL      Absolute Lymphocytes 2.40 Thousands/µL      Absolute Monocytes 0.76 Thousand/µL      Eosinophils Absolute 0.38 Thousand/µL      Basophils Absolute 0.05 Thousands/µL             CT renal stone study abdomen pelvis wo contrast   Final Interpretation by Wilner Corral MD (01/07 2203)         1. Obstructing 10 x 7 mm left UPJ calculus  with mild upstream hydronephrosis and mild perinephric stranding. Nonobstructing 6 mm left lower pole renal calculus.   2. Additional stable findings as noted.      The study was marked in EPIC for immediate notification.         Workstation performed: [id number]             Procedures    ED Medication and Procedure Management   Prior to Admission Medications   Prescriptions Last Dose Informant Patient Reported? Taking?   cetirizine (ZyrTEC) 10 mg tablet  Self No No   Sig: Take 1 tablet (10 mg total) by mouth daily   fluticasone (FLONASE) 50 mcg/act nasal spray  Self No No   Si spray into each nostril daily   gabapentin (NEURONTIN) 300 mg capsule   No No   Sig: Take 1 capsule (300 mg total) by mouth 2 (two) times a day   meloxicam (MOBIC) 15 mg tablet  Self No No   Sig: Take 1 tablet (15 mg total) by mouth in the morning   pantoprazole (PROTONIX) 40 mg tablet   No No   Sig: TAKE ONE TABLET BY MOUTH ONE TIME DAILY      Facility-Administered Medications: None     Patient's Medications   Discharge Prescriptions    No medications on file     No discharge procedures on file.  ED SEPSIS DOCUMENTATION   Time reflects when diagnosis was documented in both MDM as applicable and the Disposition within this note       Time User Action Codes Description Comment    2025 10:43 PM Dimple Sauceda [N20.1] Ureterolithiasis     2025 10:48 PM Dimple Sauceda [R52] Intractable pain                  Dimple Sauceda MD  [id number]

## 2025-01-08 NOTE — PROGRESS NOTES
Progress Note - Hospitalist   Name: Antonio Garrison 49 y.o. male I MRN: [id number]  Unit/Bed#: -01 I Date of Admission: 1/7/2025   Date of Service: 1/8/2025 I Hospital Day: 0    Assessment & Plan  Ureterolithiasis    Urology consulted, Left - CYSTOSCOPY URETEROSCOPY WITH LITHOTRIPSY HOLMIUM LASER. BASKET STONE EXTRACTION. RETROGRADE PYELOGRAM AND INSERTION STENT URETERAL done today  Continue pain control  UA shows no bacteria, no indication for antibiotics  Cont IV fluids and generous po hydration  GERD (gastroesophageal reflux disease)  Continue PTA Protonix  Allergic rhinitis  Continue PTA Flonase and cetirizine  Obesity, Class III, BMI 40-49.9 (morbid obesity) ([facility])  BMI 41.32  Carb controlled diet when patient is not n.p.o. for procedure  Recommend exercise and healthy diet    VTE Pharmacologic Prophylaxis: VTE Score: 3 Moderate Risk (Score 3-4) - Pharmacological DVT Prophylaxis Ordered: heparin.    Mobility:   Basic Mobility Inpatient Raw Score: 24  JH-HLM Goal: 8: Walk 250 feet or more  JH-HLM Achieved: 7: Walk 25 feet or more  JH-HLM Goal achieved. Continue to encourage appropriate mobility.    Patient Centered Rounds: I performed bedside rounds with nursing staff today.   Discussions with Specialists or Other Care Team Provider: urology    Education and Discussions with Family / Patient:  discussed with pt.     Current Length of Stay: 0 day(s)  Current Patient Status: Observation   Certification Statement: The patient will continue to require additional inpatient hospital stay due to overnight monitoring s/p stent placement and needing iv meds ofor pain  Discharge Plan: Anticipate discharge tomorrow to home.    Code Status: Level 1 - Full Code    Subjective   Pt seen and examined at bedside during rounds  Pt is post op and resting   Pain is adequately controlled  No fever cp sob NVD    Objective :  Temp:  [97.2 °F (36.2 °C)-98.5 °F (36.9 °C)] 98.3 °F (36.8 °C)  HR:  [76-92] 84  BP: (117-161)/(56-84)  121/71  Resp:  [14-20] 16  SpO2:  [92 %-97 %] 92 %  O2 Device: None (Room air)  Nasal Cannula O2 Flow Rate (L/min):  [2 L/min] 2 L/min    There is no height or weight on file to calculate BMI.     Input and Output Summary (last 24 hours):     Intake/Output Summary (Last 24 hours) at 1/8/2025 1639  Last data filed at 1/8/2025 1415  Gross per 24 hour   Intake 600 ml   Output 1045 ml   Net -445 ml       Physical Exam  General- Awake, alert and oriented x 3, looks comfortable  HEENT- Normocephalic, atraumatic, oral mucosa- moist  Neck- Supple, No carotid bruit, no JVD  CVS- Normal S1/ S2, Regular rate and rhythm, No murmur, No edema  Respiratory system- B/L clear breath sounds, no wheezing  Abdomen- Soft, Non distended, no tenderness, Bowel sound- present 4 quads  Genitourinary- No suprapubic tenderness, No CVA tenderness  Skin- No new bruise or rash  Musculoskeletal- No gross deformity  Psych- No acute psychosis  CNS- CN II- XII grossly intact, No acute focal neurologic deficit noted      Lines/Drains:  Lines/Drains/Airways       Active Status       Name Placement date Placement time Site Days    Ureteral Internal Stent Left ureter 6 Fr. 01/08/25  1321  Left ureter  less than 1                            Lab Results: I have reviewed the following results:   Results from last 7 days   Lab Units 01/08/25  0506 01/07/25  2131   WBC Thousand/uL 8.82 7.90   HEMOGLOBIN g/dL 14.0 14.9   HEMATOCRIT % 42.9 44.0   PLATELETS Thousands/uL 205 230   SEGS PCT %  --  54   LYMPHO PCT %  --  30   MONO PCT %  --  10   EOS PCT %  --  5     Results from last 7 days   Lab Units 01/08/25  0506 01/07/25  2131   SODIUM mmol/L 140 138   POTASSIUM mmol/L 4.1 3.8   CHLORIDE mmol/L 109* 107   CO2 mmol/L 28 25   BUN mg/dL 14 14   CREATININE mg/dL 0.90 0.95   ANION GAP mmol/L 3* 6   CALCIUM mg/dL 9.5 10.0   ALBUMIN g/dL  --  4.1   TOTAL BILIRUBIN mg/dL  --  0.48   ALK PHOS U/L  --  73   ALT U/L  --  38   AST U/L  --  27   GLUCOSE RANDOM mg/dL 102  161*                       Recent Cultures (last 7 days):         Imaging Results Review: I reviewed radiology reports from this admission including: CT renal stone abd pelvis wo contrast.  Other Study Results Review: No additional pertinent studies reviewed.    Last 24 Hours Medication List:     Current Facility-Administered Medications:     acetaminophen (TYLENOL) tablet 650 mg, Q6H PRN    fluticasone (FLONASE) 50 mcg/act nasal spray 1 spray, Daily    gabapentin (NEURONTIN) capsule 300 mg, BID    HYDROmorphone (DILAUDID) injection 1 mg, Q6H PRN    lactated ringers infusion, Continuous, Last Rate: 125 mL/hr (01/08/25 0813)    loratadine (CLARITIN) tablet 10 mg, Daily    ondansetron (ZOFRAN) injection 4 mg, Q6H PRN    oxyCODONE (ROXICODONE) IR tablet 5 mg, Q6H PRN    oxyCODONE (ROXICODONE) split tablet 2.5 mg, Q6H PRN    pantoprazole (PROTONIX) EC tablet 40 mg, Daily    Administrative Statements   Today, Patient Was Seen By: Kenny Cuadra MD  I have spent a total time of 40 minutes in caring for this patient on the day of the visit/encounter including Diagnostic results, Prognosis, Risks and benefits of tx options, Instructions for management, Patient and family education, Importance of tx compliance, Risk factor reductions, Impressions, Counseling / Coordination of care, Documenting in the medical record, Reviewing / ordering tests, medicine, procedures  , Obtaining or reviewing history  , and Communicating with other healthcare professionals .    **Please Note: This note may have been constructed using a voice recognition system.**

## 2025-01-08 NOTE — DISCHARGE INSTR - AVS FIRST PAGE
Antonio,    Today you underwent ureteroscopy with laser lithotripsy for destruction of stones and unblocking of your kidney and ureter.  This went well.  After surgery like this it is not uncommon to have urgency and frequency of urination along with some blood in the urine.  You may also feel some discomfort in your kidney when urinating.    Our office will call you to arrange for ureteral stent removal by way of cystoscopy and ureteral stent removal in an office setting under local anesthesia.    Please stay well-hydrated as you recover.  We have given you medications to make your recovery less bothersome.    We wish you a rapid and uneventful recovery,    Dr. Arce

## 2025-01-08 NOTE — H&P (VIEW-ONLY)
Consultation - Urology   Name: Antonio Garrison 49 y.o. male I MRN: [id number]  Unit/Bed#: -01 I Date of Admission: 1/7/2025   Date of Service: 1/8/2025 I Hospital Day: 0   Inpatient consult to Urology  Consult performed by: Sofi Mccartney PA-C  Consult ordered by: Georgiana Cruz PA-C        Physician Requesting Evaluation: Kenny Cuadra MD   Reason for Evaluation / Principal Problem: ureterolithiasis    Assessment & Plan  Ureterolithiasis  Obstructing 10x 7 mm left UPJ calculus with mild hydronephrosis  VSS, afebrile  Cr at baseline  No leukocytosis  UA no infection  Uncontrolled pain.   Discussed with patient cystoscopy, ureteroscopy, holmium laser lithotripsy, basket stone extraction, retrograde pyelogram, insertion of ureteral stent.  Discussed risk associated with procedure including risk with anesthesia, bleeding, infection, damage to kidneys, ureter, bladder, inability to treat stone, ureteral stricture, need for delayed ureteroscopy for any signs of infection.  Discussed stent colic including frequency, urgency, hematuria, flank pain.  Possibility stent only due to size/location  NPO and consent signed        Subjective:   HPI: Antonio is a 49-year-old male past med history GERD, allergic rhinitis who presented to the ED with left-sided flank pain.  Patient is known to [facility] known nonobstructing left-sided nephrolithiasis.  He reports his pain started at 7 PM last night, associated with nausea and vomiting.  He does report subjective chills at home.  Denies any fever.  He had some mild blood in the urine yesterday.  In the ED he underwent a CT scan which showed an obstructing 10 x 7 mm left UPJ calculus with mild hydronephrosis.  His labs are stable.  He was admitted to medicine for uncontrolled pain.  Urology was consulted for surgical intervention    Review of Systems   Constitutional:  Positive for chills. Negative for fever.   Eyes:  Negative for pain and visual disturbance.    Respiratory:  Negative for cough and shortness of breath.    Cardiovascular:  Negative for chest pain and palpitations.   Gastrointestinal:  Positive for abdominal pain, nausea and vomiting.   Genitourinary:  Positive for hematuria. Negative for dysuria.   Musculoskeletal:  Negative for arthralgias and back pain.   Skin:  Negative for color change and rash.   Neurological:  Negative for seizures and syncope.   All other systems reviewed and are negative.      Objective:    Vitals: Blood pressure 118/69, pulse 76, temperature 97.7 °F (36.5 °C), resp. rate 16, SpO2 94%.,There is no height or weight on file to calculate BMI.    Physical Exam  Constitutional:       General: He is not in acute distress.     Appearance: Normal appearance. He is normal weight. He is not ill-appearing or toxic-appearing.   HENT:      Head: Normocephalic and atraumatic.      Right Ear: External ear normal.      Left Ear: External ear normal.      Nose: Nose normal.      Mouth/Throat:      Mouth: Mucous membranes are moist.   Eyes:      General: No scleral icterus.     Conjunctiva/sclera: Conjunctivae normal.   Cardiovascular:      Rate and Rhythm: Normal rate and regular rhythm.      Pulses: Normal pulses.      Heart sounds: Normal heart sounds.   Pulmonary:      Effort: Pulmonary effort is normal.      Breath sounds: Normal breath sounds. No stridor.   Abdominal:      General: There is no distension.      Tenderness: There is abdominal tenderness. There is no guarding.   Musculoskeletal:      Cervical back: Normal range of motion.   Neurological:      General: No focal deficit present.      Mental Status: He is alert and oriented to person, place, and time. Mental status is at baseline.   Psychiatric:         Mood and Affect: Mood normal.         Behavior: Behavior normal.         Thought Content: Thought content normal.         Judgment: Judgment normal.         Imaging:  CT ABDOMEN AND PELVIS WITHOUT IV CONTRAST - LOW DOSE RENAL STONE      INDICATION: L flank pain.     COMPARISON: CT 7/12/2024     TECHNIQUE: Low radiation dose thin section CT examination of the abdomen and pelvis was performed without intravenous or oral contrast according to a protocol specifically designed to evaluate for urinary tract calculus. Axial, sagittal, and coronal 2D   reformatted images were created from the source data and submitted for interpretation. Evaluation for pathology in the abdomen and pelvis that is unrelated to urinary tract calculi is limited.     Radiation dose length product (DLP) for this visit: 967 mGy-cm . This examination, like all CT scans performed in the [facility], was performed utilizing techniques to minimize radiation dose exposure, including the use of iterative   reconstruction and automated exposure control.     URINARY TRACT FINDINGS:     RIGHT KIDNEY AND URETER: No urinary tract calculi. No hydronephrosis or hydroureter.     LEFT KIDNEY AND URETER: 10 x 7 mm left renal calculus has migrated from the upper pole collecting system (noted on the prior study 7/12/2024) now resulting in obstruction at the left ureteropelvic junction with mild upstream hydronephrosis and   perinephric stranding. 6 x 3 mm lower pole calculus remains.     URINARY BLADDER: Unremarkable.        ADDITIONAL FINDINGS:     LOWER CHEST: No clinically significant abnormality in the visualized lower chest. Small hiatal hernia.     SOLID VISCERA: Limited low radiation dose noncontrast CT evaluation demonstrates no clinically significant abnormality of the imaged portions of the liver, spleen, pancreas, or adrenal glands.     GALLBLADDER/BILIARY TREE: No calcified gallstones. No pericholecystic inflammatory change. No biliary dilation.     STOMACH AND BOWEL: Unremarkable.     APPENDIX: Surgically absent.     ABDOMINOPELVIC CAVITY: No ascites. No pneumoperitoneum. No lymphadenopathy. 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     VESSELS: Unremarkable for patient's age.     REPRODUCTIVE ORGANS: Unremarkable for patient's age.     ABDOMINAL WALL/INGUINAL REGIONS: Unremarkable.     BONES: No acute fracture or suspicious osseous lesion.     IMPRESSION:        1. Obstructing 10 x 7 mm left UPJ calculus with mild upstream hydronephrosis and mild perinephric stranding. Nonobstructing 6 mm left lower pole renal calculus.  2. Additional stable findings as noted.     The study was marked in EPIC for immediate notification.        Workstation performed: [id number]       Labs:  Recent Labs     01/07/25 2131 01/08/25  0506   WBC 7.90 8.82       Recent Labs     01/07/25 2131 01/08/25  0506   HGB 14.9 14.0     Recent Labs     01/07/25 2131 01/08/25  0506   HCT 44.0 42.9     Recent Labs     01/07/25 2131 01/08/25  0506   CREATININE 0.95 0.90         History:    Past Medical History:   Diagnosis Date    Anxiety and depression     Colon polyp     Depression     Esophageal reflux     Fracture of both hands     Hair loss     Heartburn     Hiatal hernia     Resolved 7/24/2015     Night sweats     Sleep disturbance     Starting and stopping of urinary stream during micturition     Stomach problems     Weight gain      Social History     Socioeconomic History    Marital status: /Civil Union     Spouse name: Not on file    Number of children: Not on file    Years of education: Not on file    Highest education level: High school graduate   Occupational History    Occupation:    Tobacco Use    Smoking status: Former     Average packs/day: 1 pack/day for 28.0 years (28.0 ttl pk-yrs)     Types: Cigarettes     Start date: 1991    Smokeless tobacco: Never   Vaping Use    Vaping status: Never Used   Substance and Sexual Activity    Alcohol use: Yes     Comment: weekends    Drug use: Not Currently     Comment: Quit - As per  [employer]     Sexual activity: Yes   Other Topics Concern    Not on file   Social History Narrative    Alcohol use: Very seldom - As per [employer]    Cafeine use    Lives with roommates: Girlfriend and her son    No known STD risk factors    Social alcohol use - As per [employer]      Social Drivers of Health     Financial Resource Strain: Not on file   Food Insecurity: No Food Insecurity (2025)    Nursing - Inadequate Food Risk Classification     Worried About Running Out of Food in the Last Year: Not on file     Ran Out of Food in the Last Year: Not on file     Ran Out of Food in the Last Year: Never true   Transportation Needs: No Transportation Needs (2025)    Nursing - Transportation Risk Classification     Lack of Transportation: Not on file     Lack of Transportation: No   Physical Activity: Not on file   Stress: Not on file   Social Connections: Not on file   Intimate Partner Violence: Unknown (2025)    Nursing IPS     Feels Physically and Emotionally Safe: Not on file     Physically Hurt by Someone: Not on file     Humiliated or Emotionally Abused by Someone: Not on file     Physically Hurt by Someone: No     Hurt or Threatened by Someone: No   Housing Stability: Unknown (2025)    Nursing: Inadequate Housing Risk Classification     Has Housing: Not on file     Worried About Losing Housing: Not on file     Unable to Get Utilities: Not on file     Unable to Pay for Housing in the Last Year: No     Has Housin     Past Surgical History:   Procedure Laterality Date    APPENDECTOMY      COLONOSCOPY      ESOPHAGOGASTRODUODENOSCOPY      Diagnostic     HAND SURGERY      Right in  and left in      NOSE SURGERY      Nasal septal deviation repair      Family History   Problem Relation Age of Onset    No Known Problems Mother     Diabetes Father     Diabetes Maternal Uncle     Heart disease Maternal Grandmother     Diabetes Maternal Grandmother     Pancreatic cancer Maternal Grandmother      Colon cancer Maternal Grandfather     Dementia Paternal Grandmother        Sofi Mccartney PA-C  Date: 1/8/2025 Time: 8:43 AM

## 2025-01-08 NOTE — ANESTHESIA PREPROCEDURE EVALUATION
Procedure:  CYSTOSCOPY URETEROSCOPY WITH LITHOTRIPSY HOLMIUM LASER, RETROGRADE PYELOGRAM AND INSERTION STENT URETERAL (Left: Bladder)    Relevant Problems   GI/HEPATIC   (+) GERD (gastroesophageal reflux disease)      MUSCULOSKELETAL   (+) Chronic midline low back pain without sciatica   (+) Lumbar spondylosis      NEURO/PSYCH   (+) Anxiety   (+) Chronic midline low back pain without sciatica        Physical Exam    Airway    Mallampati score: I  TM Distance: >3 FB  Neck ROM: full     Dental       Cardiovascular  Cardiovascular exam normal    Pulmonary  Pulmonary exam normal     Other Findings        Anesthesia Plan  ASA Score- 3     Anesthesia Type- IV sedation with anesthesia with ASA Monitors.         Additional Monitors:     Airway Plan:            Plan Factors-Exercise tolerance (METS): >4 METS.    Chart reviewed. EKG reviewed. Imaging results reviewed. Existing labs reviewed. Patient summary reviewed.                  Induction- intravenous.    Postoperative Plan- Plan for postoperative opioid use. Planned trial extubation    Perioperative Resuscitation Plan - Level 1 - Full Code.       Informed Consent- Anesthetic plan and risks discussed with patient.  I personally reviewed this patient with the CRNA. Discussed and agreed on the Anesthesia Plan with the CRNA..

## 2025-01-08 NOTE — ASSESSMENT & PLAN NOTE
Obstructing 10x 7 mm left UPJ calculus with mild hydronephrosis  VSS, afebrile  Cr at baseline  No leukocytosis  UA no infection  Uncontrolled pain.   Discussed with patient cystoscopy, ureteroscopy, holmium laser lithotripsy, basket stone extraction, retrograde pyelogram, insertion of ureteral stent.  Discussed risk associated with procedure including risk with anesthesia, bleeding, infection, damage to kidneys, ureter, bladder, inability to treat stone, ureteral stricture, need for delayed ureteroscopy for any signs of infection.  Discussed stent colic including frequency, urgency, hematuria, flank pain.  Possibility stent only due to size/location  NPO and consent signed

## 2025-01-08 NOTE — ASSESSMENT & PLAN NOTE
Urology consulted, Left - CYSTOSCOPY URETEROSCOPY WITH LITHOTRIPSY HOLMIUM LASER. BASKET STONE EXTRACTION. RETROGRADE PYELOGRAM AND INSERTION STENT URETERAL done today  Continue pain control  UA shows no bacteria, no indication for antibiotics  Cont IV fluids and generous po hydration

## 2025-01-08 NOTE — ANESTHESIA POSTPROCEDURE EVALUATION
Post-Op Assessment Note    CV Status:  Stable    Pain management: adequate       Mental Status:  Alert and awake   Hydration Status:  Euvolemic   PONV Controlled:  Controlled   Airway Patency:  Patent     Post Op Vitals Reviewed: Yes    No anethesia notable event occurred.    Staff: Anesthesiologist           Last Filed PACU Vitals:  Vitals Value Taken Time   Temp 98.5 °F (36.9 °C) 01/08/25 1340   Pulse 92 01/08/25 1415   /84 01/08/25 1415   Resp 16 01/08/25 1415   SpO2 95 % 01/08/25 1415       Modified Santa:     Vitals Value Taken Time   Activity 2 01/08/25 1415   Respiration 2 01/08/25 1415   Circulation 2 01/08/25 1415   Consciousness 2 01/08/25 1415   Oxygen Saturation 2 01/08/25 1415     Modified Santa Score: 10

## 2025-01-08 NOTE — CONSULTS
Consultation - Urology   Name: Antonio Garrison 49 y.o. male I MRN: [id number]  Unit/Bed#: -01 I Date of Admission: 1/7/2025   Date of Service: 1/8/2025 I Hospital Day: 0   Inpatient consult to Urology  Consult performed by: Sofi Mccartney PA-C  Consult ordered by: Georgiana Cruz PA-C        Physician Requesting Evaluation: Kenny Cuadra MD   Reason for Evaluation / Principal Problem: ureterolithiasis    Assessment & Plan  Ureterolithiasis  Obstructing 10x 7 mm left UPJ calculus with mild hydronephrosis  VSS, afebrile  Cr at baseline  No leukocytosis  UA no infection  Uncontrolled pain.   Discussed with patient cystoscopy, ureteroscopy, holmium laser lithotripsy, basket stone extraction, retrograde pyelogram, insertion of ureteral stent.  Discussed risk associated with procedure including risk with anesthesia, bleeding, infection, damage to kidneys, ureter, bladder, inability to treat stone, ureteral stricture, need for delayed ureteroscopy for any signs of infection.  Discussed stent colic including frequency, urgency, hematuria, flank pain.  Possibility stent only due to size/location  NPO and consent signed        Subjective:   HPI: Antonio is a 49-year-old male past med history GERD, allergic rhinitis who presented to the ED with left-sided flank pain.  Patient is known to [facility] known nonobstructing left-sided nephrolithiasis.  He reports his pain started at 7 PM last night, associated with nausea and vomiting.  He does report subjective chills at home.  Denies any fever.  He had some mild blood in the urine yesterday.  In the ED he underwent a CT scan which showed an obstructing 10 x 7 mm left UPJ calculus with mild hydronephrosis.  His labs are stable.  He was admitted to medicine for uncontrolled pain.  Urology was consulted for surgical intervention    Review of Systems   Constitutional:  Positive for chills. Negative for fever.   Eyes:  Negative for pain and visual disturbance.    Respiratory:  Negative for cough and shortness of breath.    Cardiovascular:  Negative for chest pain and palpitations.   Gastrointestinal:  Positive for abdominal pain, nausea and vomiting.   Genitourinary:  Positive for hematuria. Negative for dysuria.   Musculoskeletal:  Negative for arthralgias and back pain.   Skin:  Negative for color change and rash.   Neurological:  Negative for seizures and syncope.   All other systems reviewed and are negative.      Objective:    Vitals: Blood pressure 118/69, pulse 76, temperature 97.7 °F (36.5 °C), resp. rate 16, SpO2 94%.,There is no height or weight on file to calculate BMI.    Physical Exam  Constitutional:       General: He is not in acute distress.     Appearance: Normal appearance. He is normal weight. He is not ill-appearing or toxic-appearing.   HENT:      Head: Normocephalic and atraumatic.      Right Ear: External ear normal.      Left Ear: External ear normal.      Nose: Nose normal.      Mouth/Throat:      Mouth: Mucous membranes are moist.   Eyes:      General: No scleral icterus.     Conjunctiva/sclera: Conjunctivae normal.   Cardiovascular:      Rate and Rhythm: Normal rate and regular rhythm.      Pulses: Normal pulses.      Heart sounds: Normal heart sounds.   Pulmonary:      Effort: Pulmonary effort is normal.      Breath sounds: Normal breath sounds. No stridor.   Abdominal:      General: There is no distension.      Tenderness: There is abdominal tenderness. There is no guarding.   Musculoskeletal:      Cervical back: Normal range of motion.   Neurological:      General: No focal deficit present.      Mental Status: He is alert and oriented to person, place, and time. Mental status is at baseline.   Psychiatric:         Mood and Affect: Mood normal.         Behavior: Behavior normal.         Thought Content: Thought content normal.         Judgment: Judgment normal.         Imaging:  CT ABDOMEN AND PELVIS WITHOUT IV CONTRAST - LOW DOSE RENAL STONE      INDICATION: L flank pain.     COMPARISON: CT 7/12/2024     TECHNIQUE: Low radiation dose thin section CT examination of the abdomen and pelvis was performed without intravenous or oral contrast according to a protocol specifically designed to evaluate for urinary tract calculus. Axial, sagittal, and coronal 2D   reformatted images were created from the source data and submitted for interpretation. Evaluation for pathology in the abdomen and pelvis that is unrelated to urinary tract calculi is limited.     Radiation dose length product (DLP) for this visit: 967 mGy-cm . This examination, like all CT scans performed in the [facility], was performed utilizing techniques to minimize radiation dose exposure, including the use of iterative   reconstruction and automated exposure control.     URINARY TRACT FINDINGS:     RIGHT KIDNEY AND URETER: No urinary tract calculi. No hydronephrosis or hydroureter.     LEFT KIDNEY AND URETER: 10 x 7 mm left renal calculus has migrated from the upper pole collecting system (noted on the prior study 7/12/2024) now resulting in obstruction at the left ureteropelvic junction with mild upstream hydronephrosis and   perinephric stranding. 6 x 3 mm lower pole calculus remains.     URINARY BLADDER: Unremarkable.        ADDITIONAL FINDINGS:     LOWER CHEST: No clinically significant abnormality in the visualized lower chest. Small hiatal hernia.     SOLID VISCERA: Limited low radiation dose noncontrast CT evaluation demonstrates no clinically significant abnormality of the imaged portions of the liver, spleen, pancreas, or adrenal glands.     GALLBLADDER/BILIARY TREE: No calcified gallstones. No pericholecystic inflammatory change. No biliary dilation.     STOMACH AND BOWEL: Unremarkable.     APPENDIX: Surgically absent.     ABDOMINOPELVIC CAVITY: No ascites. No pneumoperitoneum. No lymphadenopathy. 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     VESSELS: Unremarkable for patient's age.     REPRODUCTIVE ORGANS: Unremarkable for patient's age.     ABDOMINAL WALL/INGUINAL REGIONS: Unremarkable.     BONES: No acute fracture or suspicious osseous lesion.     IMPRESSION:        1. Obstructing 10 x 7 mm left UPJ calculus with mild upstream hydronephrosis and mild perinephric stranding. Nonobstructing 6 mm left lower pole renal calculus.  2. Additional stable findings as noted.     The study was marked in EPIC for immediate notification.        Workstation performed: [id number]       Labs:  Recent Labs     01/07/25 2131 01/08/25  0506   WBC 7.90 8.82       Recent Labs     01/07/25 2131 01/08/25  0506   HGB 14.9 14.0     Recent Labs     01/07/25 2131 01/08/25  0506   HCT 44.0 42.9     Recent Labs     01/07/25 2131 01/08/25  0506   CREATININE 0.95 0.90         History:    Past Medical History:   Diagnosis Date    Anxiety and depression     Colon polyp     Depression     Esophageal reflux     Fracture of both hands     Hair loss     Heartburn     Hiatal hernia     Resolved 7/24/2015     Night sweats     Sleep disturbance     Starting and stopping of urinary stream during micturition     Stomach problems     Weight gain      Social History     Socioeconomic History    Marital status: /Civil Union     Spouse name: Not on file    Number of children: Not on file    Years of education: Not on file    Highest education level: High school graduate   Occupational History    Occupation:    Tobacco Use    Smoking status: Former     Average packs/day: 1 pack/day for 28.0 years (28.0 ttl pk-yrs)     Types: Cigarettes     Start date: 1991    Smokeless tobacco: Never   Vaping Use    Vaping status: Never Used   Substance and Sexual Activity    Alcohol use: Yes     Comment: weekends    Drug use: Not Currently     Comment: Quit - As per  [employer]     Sexual activity: Yes   Other Topics Concern    Not on file   Social History Narrative    Alcohol use: Very seldom - As per [employer]    Cafeine use    Lives with roommates: Girlfriend and her son    No known STD risk factors    Social alcohol use - As per [employer]      Social Drivers of Health     Financial Resource Strain: Not on file   Food Insecurity: No Food Insecurity (2025)    Nursing - Inadequate Food Risk Classification     Worried About Running Out of Food in the Last Year: Not on file     Ran Out of Food in the Last Year: Not on file     Ran Out of Food in the Last Year: Never true   Transportation Needs: No Transportation Needs (2025)    Nursing - Transportation Risk Classification     Lack of Transportation: Not on file     Lack of Transportation: No   Physical Activity: Not on file   Stress: Not on file   Social Connections: Not on file   Intimate Partner Violence: Unknown (2025)    Nursing IPS     Feels Physically and Emotionally Safe: Not on file     Physically Hurt by Someone: Not on file     Humiliated or Emotionally Abused by Someone: Not on file     Physically Hurt by Someone: No     Hurt or Threatened by Someone: No   Housing Stability: Unknown (2025)    Nursing: Inadequate Housing Risk Classification     Has Housing: Not on file     Worried About Losing Housing: Not on file     Unable to Get Utilities: Not on file     Unable to Pay for Housing in the Last Year: No     Has Housin     Past Surgical History:   Procedure Laterality Date    APPENDECTOMY      COLONOSCOPY      ESOPHAGOGASTRODUODENOSCOPY      Diagnostic     HAND SURGERY      Right in  and left in      NOSE SURGERY      Nasal septal deviation repair      Family History   Problem Relation Age of Onset    No Known Problems Mother     Diabetes Father     Diabetes Maternal Uncle     Heart disease Maternal Grandmother     Diabetes Maternal Grandmother     Pancreatic cancer Maternal Grandmother      Colon cancer Maternal Grandfather     Dementia Paternal Grandmother        Sofi Mccartney PA-C  Date: 1/8/2025 Time: 8:43 AM

## 2025-01-08 NOTE — INTERVAL H&P NOTE
H&P reviewed. After examining the patient I find no changes in the patients condition since the H&P had been written.    Vitals:    01/08/25 1138   BP: 126/68   Pulse: 79   Resp: 20   Temp: 97.6 °F (36.4 °C)   SpO2: 97%   Proceed to left ureteroscopy and laser lithotripsy  Marked on left arm

## 2025-01-08 NOTE — OP NOTE
OPERATIVE REPORT  PATIENT NAME: Antonio Garrison    :  1975  MRN: [id number]  Pt Location: [facility] OR ROOM 04    SURGERY DATE: 2025    Surgeons and Role:     * Félix Arce MD - Primary    Preop Diagnosis:  Ureterolithiasis [N20.1]    Post-Op Diagnosis Codes:     * Ureterolithiasis [N20.1]    Procedure(s):  Left - CYSTOSCOPY URETEROSCOPY WITH LITHOTRIPSY HOLMIUM LASER. BASKET STONE EXTRACTION. RETROGRADE PYELOGRAM AND INSERTION STENT URETERAL    Specimen(s):  ID Type Source Tests Collected by Time Destination   A : LEFT RENAL STONE FRAGMENTS Calculus Kidney, Left STONE ANALYSIS Félix Arce MD 2025 1305        Estimated Blood Loss:   Minimal    Drains:  Ureteral Internal Stent Left ureter 6 Fr. (Active)   Number of days: 0       Anesthesia Type:   General    Operative Indications:  Ureterolithiasis [N20.1]  UPJ stenosis  Uncontrolled left flank pain    Operative Findings:  Renal pelvis stone lodged at a stenotic left UPJ, laser lithotripsy performed with fragmentation settings, all basket sized fragments removed, dust and small sand remains (too small to be grasped with the tines of the basket). 6 fr x 28 cm left ureteral stent placed, planned dwell time 2-3 weeks      Complications:   None    Procedure and Technique:          PROCEDURES PERFORMED:  1) Cystoscopy  2) Left retrograde pyelography with fluoroscopic interpretation  3) Left ureteroscopy with laser lithotripsy and basket extraction of stone  4) Left ureteral stent placement (6F x 28cm)    SURGEON:  Félix Arce MD    ASSISTANTS:  None    NOTE:  There were no qualified teaching residents to assist with this case    ANESTHESIA: General     COMPLICATIONS:   None    ANTIBIOTICS:  ancef    INTRAOPERATIVE THROMBOEMBOLISM PROPHYLAXIS:  Pneumatic compression stockings       FINDINGS:    The Left calculus was not radiopaque on plain fluoroscopy.  Retrograde pyelogram was performed on the Left side using a 5 Fr open ended catheter.   Approximately 25 mL contrast was injected.    3. The following findings were noted: long ureter, moderate hydronephrosis, slight stenosis at the left UPJ      INDICATIONS FOR PROCEDURE:  Antonio Garrison is an 49 y.o. old male with a Left renal calculus and uncontrolled flank pain.  After discussing the options for treatment,  the patient elected to undergo ureteroscopy and ureteral stent placement with all indicated procedures.  We discussed the procedure in detail, the alternatives, and the risks, and they signed informed consent to proceed (these are outlined in the surgical consent form).    PROCEDURE IN DETAIL:     The patient was identified by name, date of birth, and MRN  and brought to the OR.  Antibiotic prophylaxis and DVT prophylaxis were administered as per the guidelines.  They were placed in the dorsal lithotomy position with care to pad all pressure points.  They were prepped and draped in the usual sterile fashion.  A surgical time out was performed with all in the room in agreement with the correct patient, procedure, indications, and laterality.  A 21-Frisian rigid cystoscope was used to enter the bladder.  The bladder was inspected in its entirety and there were no lesions noted.  The ureteral orifices were identified in their normal orthotopic positions.     The Left ureteral orifice was identified and a 5 Fr open ended catheter was placed into the ureteral orifice.   A retrograde pyelogram was performed with the findings as described above.  A wire was advanced up to the kidney under fluoroscopic guidance.  Leaving this safety wire in place, the bladder was drained.  A second wire was placed. A 12/14 Fr x 24 cm ureteral access sheath was placed under fluoroscopic guidance    A flexible digital ureteroscope was used.     The stone was encountered in the renal pelvic location.  The stone was not noted to be impacted.  A holmium laser fiber was passed through the ureteroscope and laser lithotripsy was  commenced at settings of 1.5 J and 7 Hz.  The stones were fragmented to very small pieces and dust.      Once we were satisfied that the stone was fragmented to dust, a 1.9 Ukrainian zero tip nitinol basket was passed through the ureteroscope.  The residual fragments were basketed out and removed. The ureteroscope was backed down the ureter under vision and there were no residual fragments and the ureter was noted to be intact with no injury and severe edema where the stone had been located.   A 6 Fr x 28 cm JJ stent was then passed up the wire  under fluoroscopic guidance into the kidney with a good curl noted in the kidney and in the bladder.  The stent string was removed. The bladder was drained.      The patient was placed back into the supine position, awakened from general anesthesia and brought to recovery room in stable condition.      ESTIMATED BLOOD LOSS:  Minimal      DRAINS:   Ureteral Internal Stent Left ureter 6 Fr. (Active)       SPECIMENS:   Order Name Source Comment Collection Info Order Time   STONE ANALYSIS Kidney, Left LEFT RENAL STONE FRAGMENTS Collected By: Félix Arce MD 1/8/2025  1:18 PM        IMPLANTS:   Implant Name Type Inv. Item Serial No.  Lot No. LRB No. Used Action   STENT URETERAL 6FR 28CM INLAY OPTIMA - [id number]  STENT URETERAL 6FR 28CM INLAY OPTIMA  [employer] [id number] Left 1 Implanted        COMPLICATIONS: None    DISPOSITION: PACU    PLAN:  The patient is status post URS and LL with basket stone extraction. They can be discharged home later today when meeting criteria. Our office will call them to arrange for cystoscopy and ureteral stent removal in the office      I was present for the entire procedure. and A qualified resident physician was not available.    Patient Disposition:  PACU              SIGNATURE: Félix Arce MD  DATE: January 8, 2025  TIME: 1:47 PM

## 2025-01-08 NOTE — ANESTHESIA POSTPROCEDURE EVALUATION
Post-Op Assessment Note    CV Status:  Stable  Pain Score: 0    Pain management: adequate       Mental Status:  Alert   PONV Controlled:  None   Airway Patency:  Patent  Two or more mitigation strategies used for obstructive sleep apnea   Post Op Vitals Reviewed: Yes    No anethesia notable event occurred.            Last Filed PACU Vitals:  Vitals Value Taken Time   Temp     Pulse     BP     Resp     SpO2

## 2025-01-08 NOTE — TELEPHONE ENCOUNTER
S/p left URS and LL with BSE, stone free. Has a 6 Fr x 28 cm left stent, please arrange cystoscopy and left ureteral stent removal for 2-3 weeks from now. Can get a follow up renal u/s 4-6 weeks after stent removal

## 2025-01-09 LAB
ANION GAP SERPL CALCULATED.3IONS-SCNC: 6 MMOL/L (ref 4–13)
BASOPHILS # BLD AUTO: 0.01 THOUSANDS/ΜL (ref 0–0.1)
BASOPHILS NFR BLD AUTO: 0 % (ref 0–1)
BUN SERPL-MCNC: 13 MG/DL (ref 5–25)
CALCIUM SERPL-MCNC: 9.3 MG/DL (ref 8.4–10.2)
CHLORIDE SERPL-SCNC: 105 MMOL/L (ref 96–108)
CO2 SERPL-SCNC: 27 MMOL/L (ref 21–32)
CREAT SERPL-MCNC: 0.8 MG/DL (ref 0.6–1.3)
EOSINOPHIL # BLD AUTO: 0.02 THOUSAND/ΜL (ref 0–0.61)
EOSINOPHIL NFR BLD AUTO: 0 % (ref 0–6)
ERYTHROCYTE [DISTWIDTH] IN BLOOD BY AUTOMATED COUNT: 12.5 % (ref 11.6–15.1)
GFR SERPL CREATININE-BSD FRML MDRD: 103 ML/MIN/1.73SQ M
GLUCOSE P FAST SERPL-MCNC: 117 MG/DL (ref 65–99)
GLUCOSE SERPL-MCNC: 117 MG/DL (ref 65–140)
HCT VFR BLD AUTO: 43.2 % (ref 36.5–49.3)
HGB BLD-MCNC: 14 G/DL (ref 12–17)
IMM GRANULOCYTES # BLD AUTO: 0.05 THOUSAND/UL (ref 0–0.2)
IMM GRANULOCYTES NFR BLD AUTO: 0 % (ref 0–2)
LYMPHOCYTES # BLD AUTO: 0.92 THOUSANDS/ΜL (ref 0.6–4.47)
LYMPHOCYTES NFR BLD AUTO: 8 % (ref 14–44)
MCH RBC QN AUTO: 29.1 PG (ref 26.8–34.3)
MCHC RBC AUTO-ENTMCNC: 32.4 G/DL (ref 31.4–37.4)
MCV RBC AUTO: 90 FL (ref 82–98)
MONOCYTES # BLD AUTO: 0.95 THOUSAND/ΜL (ref 0.17–1.22)
MONOCYTES NFR BLD AUTO: 8 % (ref 4–12)
NEUTROPHILS # BLD AUTO: 10.17 THOUSANDS/ΜL (ref 1.85–7.62)
NEUTS SEG NFR BLD AUTO: 84 % (ref 43–75)
NRBC BLD AUTO-RTO: 0 /100 WBCS
PLATELET # BLD AUTO: 231 THOUSANDS/UL (ref 149–390)
PMV BLD AUTO: 10.3 FL (ref 8.9–12.7)
POTASSIUM SERPL-SCNC: 3.9 MMOL/L (ref 3.5–5.3)
RBC # BLD AUTO: 4.81 MILLION/UL (ref 3.88–5.62)
SODIUM SERPL-SCNC: 138 MMOL/L (ref 135–147)
WBC # BLD AUTO: 12.12 THOUSAND/UL (ref 4.31–10.16)

## 2025-01-09 PROCEDURE — 99232 SBSQ HOSP IP/OBS MODERATE 35: CPT

## 2025-01-09 PROCEDURE — 85025 COMPLETE CBC W/AUTO DIFF WBC: CPT | Performed by: FAMILY MEDICINE

## 2025-01-09 PROCEDURE — 80048 BASIC METABOLIC PNL TOTAL CA: CPT | Performed by: FAMILY MEDICINE

## 2025-01-09 PROCEDURE — 99233 SBSQ HOSP IP/OBS HIGH 50: CPT | Performed by: FAMILY MEDICINE

## 2025-01-09 RX ORDER — PHENAZOPYRIDINE HYDROCHLORIDE 100 MG/1
100 TABLET, FILM COATED ORAL
Status: DISCONTINUED | OUTPATIENT
Start: 2025-01-09 | End: 2025-01-10 | Stop reason: HOSPADM

## 2025-01-09 RX ORDER — ENOXAPARIN SODIUM 100 MG/ML
40 INJECTION SUBCUTANEOUS EVERY 12 HOURS
Status: DISCONTINUED | OUTPATIENT
Start: 2025-01-09 | End: 2025-01-10 | Stop reason: HOSPADM

## 2025-01-09 RX ORDER — OXYBUTYNIN CHLORIDE 5 MG/1
5 TABLET ORAL 3 TIMES DAILY
Status: DISCONTINUED | OUTPATIENT
Start: 2025-01-09 | End: 2025-01-10 | Stop reason: HOSPADM

## 2025-01-09 RX ORDER — SIMETHICONE 80 MG
80 TABLET,CHEWABLE ORAL EVERY 6 HOURS PRN
Status: DISCONTINUED | OUTPATIENT
Start: 2025-01-09 | End: 2025-01-10 | Stop reason: HOSPADM

## 2025-01-09 RX ORDER — TAMSULOSIN HYDROCHLORIDE 0.4 MG/1
0.4 CAPSULE ORAL
Status: DISCONTINUED | OUTPATIENT
Start: 2025-01-09 | End: 2025-01-10 | Stop reason: HOSPADM

## 2025-01-09 RX ORDER — POLYETHYLENE GLYCOL 3350 17 G/17G
17 POWDER, FOR SOLUTION ORAL DAILY
Status: DISCONTINUED | OUTPATIENT
Start: 2025-01-09 | End: 2025-01-10 | Stop reason: HOSPADM

## 2025-01-09 RX ORDER — BISACODYL 10 MG
10 SUPPOSITORY, RECTAL RECTAL ONCE
Status: COMPLETED | OUTPATIENT
Start: 2025-01-09 | End: 2025-01-09

## 2025-01-09 RX ORDER — DOCUSATE SODIUM 100 MG/1
100 CAPSULE, LIQUID FILLED ORAL 2 TIMES DAILY
Status: DISCONTINUED | OUTPATIENT
Start: 2025-01-09 | End: 2025-01-10 | Stop reason: HOSPADM

## 2025-01-09 RX ADMIN — HYDROMORPHONE HYDROCHLORIDE 1 MG: 1 INJECTION, SOLUTION INTRAMUSCULAR; INTRAVENOUS; SUBCUTANEOUS at 05:27

## 2025-01-09 RX ADMIN — BISACODYL 10 MG: 10 SUPPOSITORY RECTAL at 20:09

## 2025-01-09 RX ADMIN — OXYBUTYNIN CHLORIDE 5 MG: 5 TABLET ORAL at 20:09

## 2025-01-09 RX ADMIN — OXYBUTYNIN CHLORIDE 5 MG: 5 TABLET ORAL at 16:29

## 2025-01-09 RX ADMIN — ENOXAPARIN SODIUM 40 MG: 40 INJECTION SUBCUTANEOUS at 19:04

## 2025-01-09 RX ADMIN — DOCUSATE SODIUM 100 MG: 100 CAPSULE, LIQUID FILLED ORAL at 17:00

## 2025-01-09 RX ADMIN — OXYCODONE HYDROCHLORIDE 5 MG: 5 TABLET ORAL at 10:16

## 2025-01-09 RX ADMIN — SIMETHICONE 80 MG: 80 TABLET, CHEWABLE ORAL at 23:43

## 2025-01-09 RX ADMIN — Medication 2.5 MG: at 23:48

## 2025-01-09 RX ADMIN — PANTOPRAZOLE SODIUM 40 MG: 40 TABLET, DELAYED RELEASE ORAL at 08:17

## 2025-01-09 RX ADMIN — PHENAZOPYRIDINE 100 MG: 100 TABLET ORAL at 16:29

## 2025-01-09 RX ADMIN — OXYCODONE HYDROCHLORIDE 5 MG: 5 TABLET ORAL at 19:02

## 2025-01-09 RX ADMIN — BISACODYL 10 MG: 10 SUPPOSITORY RECTAL at 23:43

## 2025-01-09 RX ADMIN — SODIUM CHLORIDE, SODIUM LACTATE, POTASSIUM CHLORIDE, AND CALCIUM CHLORIDE 125 ML/HR: .6; .31; .03; .02 INJECTION, SOLUTION INTRAVENOUS at 19:41

## 2025-01-09 RX ADMIN — OXYCODONE HYDROCHLORIDE 5 MG: 5 TABLET ORAL at 03:25

## 2025-01-09 RX ADMIN — PHENAZOPYRIDINE 100 MG: 100 TABLET ORAL at 13:32

## 2025-01-09 RX ADMIN — POLYETHYLENE GLYCOL 3350 17 G: 17 POWDER, FOR SOLUTION ORAL at 12:55

## 2025-01-09 RX ADMIN — SODIUM CHLORIDE, SODIUM LACTATE, POTASSIUM CHLORIDE, AND CALCIUM CHLORIDE 125 ML/HR: .6; .31; .03; .02 INJECTION, SOLUTION INTRAVENOUS at 07:05

## 2025-01-09 RX ADMIN — TAMSULOSIN HYDROCHLORIDE 0.4 MG: 0.4 CAPSULE ORAL at 10:16

## 2025-01-09 RX ADMIN — LORATADINE 10 MG: 10 TABLET ORAL at 08:17

## 2025-01-09 NOTE — PLAN OF CARE
Problem: PAIN - ADULT  Goal: Verbalizes/displays adequate comfort level or baseline comfort level  Description: Interventions:  - Encourage patient to monitor pain and request assistance  - Assess pain using appropriate pain scale  - Administer analgesics based on type and severity of pain and evaluate response  - Implement non-pharmacological measures as appropriate and evaluate response  - Consider cultural and social influences on pain and pain management  - Notify physician/advanced practitioner if interventions unsuccessful or patient reports new pain  Outcome: Progressing     Problem: INFECTION - ADULT  Goal: Absence or prevention of progression during hospitalization  Description: INTERVENTIONS:  - Assess and monitor for signs and symptoms of infection  - Monitor lab/diagnostic results  - Monitor all insertion sites, i.e. indwelling lines, tubes, and drains  - Monitor endotracheal if appropriate and nasal secretions for changes in amount and color  - [place] appropriate cooling/warming therapies per order  - Administer medications as ordered  - Instruct and encourage patient and family to use good hand hygiene technique  - Identify and instruct in appropriate isolation precautions for identified infection/condition  Outcome: Progressing

## 2025-01-09 NOTE — ASSESSMENT & PLAN NOTE
Obstructing 10x 7 mm left UPJ calculus with mild hydronephrosis  POD 1 cystoscopy, ureteroscopy, holmium laser lithotripsy, retrograde pyelogram insertion of L ureteral stent by Dr. Arce  WBC 12, reactive  Cr stable  Stent for 2-3 weeks  Stent related colic, frequency, pain, dysuria  Flomax, oxybutinin, pyridium. Bladder scan then add oxybutinin  Likely discharge this afternoon pending stent colic pain.

## 2025-01-09 NOTE — ASSESSMENT & PLAN NOTE
Urology consulted, Left - CYSTOSCOPY URETEROSCOPY WITH LITHOTRIPSY HOLMIUM LASER. BASKET STONE EXTRACTION. RETROGRADE PYELOGRAM AND INSERTION STENT URETERAL   POD#1  Painful/pressure urination  Obtain bladder scan, if retaining, start oxybutynin  Start flomax  Continue pain control  UA shows no bacteria, no indication for antibiotics  Cont IV fluids and generous po hydration

## 2025-01-09 NOTE — CASE MANAGEMENT
Case Management Progress Note    Patient name Antonio Garrison  Location /-01 MRN [id number]  : 1975 Date 2025       LOS (days): 0  Geometric Mean LOS (GMLOS) (days):   Days to GMLOS:        OBJECTIVE:        Current admission status: Observation  Preferred Pharmacy:   [id number] - [place] - [address] [facility] [address]  Phone: [phone] Fax: [phone]    Primary Care Provider: Kimberly Dinh MD    Primary Insurance: AET[employer]  Secondary Insurance:     PROGRESS NOTE:  Discussed patient's case in interdisciplinary rounds this morning with [facility] provider. Patient is expected to be medically cleared for discharge once urology assesses continued pain patient is experiencing. Patient has no noted CM needs. CM will continue to follow.

## 2025-01-09 NOTE — UTILIZATION REVIEW
"Initial Clinical Review    OBS order 1/7 2250 converted to IP on  1/9 1620 for mngt of ureterolithiasis     Admission: Date/Time/Statement:   Admission Orders (From admission, onward)       Ordered        01/09/25 1620  INPATIENT ADMISSION  Once            01/07/25 2250  Place in Observation  Once                           INPATIENT ADMISSION     Standing Status:   Standing     Number of Occurrences:   1     Level of Care:   Med Surg [16]     Estimated length of stay:   More than 2 Midnights     Certification:   I certify that inpatient services are medically necessary for this patient for a duration of greater than two midnights. See H&P and MD Progress Notes for additional information about the patient's course of treatment.     ED Arrival Information       Expected   -    Arrival   1/7/2025 20:51    Acuity   Urgent              Means of arrival   Walk-In    Escorted by   Spouse    Service   Hospitalist    Admission type   Emergency              Arrival complaint   Flank Pain             Chief Complaint   Patient presents with    Flank Pain     L flank pain, has known kidney stones. Pain started 1 hr ago        Initial Presentation: 49 y.o. male to ED from home w/ PMH of GERD, allergic rhinitis, obesity who presents with left flank pain.  Patient states his pain began this evening around 7 PM.  Also reports left lower abdominal pain.  Reports the pain is constant in nature. + N/V and chills. + hematuria . CT abdomen pelvis without contrast revealing \"obstructing 10 x 7 mm left UPJ calculus with mild upstream hydronephrosis and mild perinephric stranding. Nonobstructing 6 mm left lower pole renal calculus.\"UA with small blood, microscopic 10-20 RBC, 2-4 WBC, occasional calcium oxalate crystals, occasional mucus threads. Admitted OBS status w/ ureterolithiasis . Plan for IVF , po oxycodone and iv dilaudid , zofran for nausea , strain urine , urology consult , NPO after MN . GERD protonix .     PE: abd tenderness TTP " LLQ, L CVA tenderness    Anticipated Length of Stay/Certification Statement:  Patient will be admitted on an observation basis with an anticipated length of stay of less than 2 midnights secondary to pain and nausea control, pending urology evaluation.     1/8  Urology Consult   uncontrolled flank pain due to a ureteropelvic junction stone on the left-hand side.  Urinalysis negative for infection. No acute kidney injury. Decision for surgery: Cystoscopy with ureteroscopy usual with indicated procedures, possible staged procedure with placement of a stent followed by subsequent ureteroscopic intervention. Keep NPO .    1/8 OP Note   Left - CYSTOSCOPY URETEROSCOPY WITH LITHOTRIPSY HOLMIUM LASER. BASKET STONE EXTRACTION. RETROGRADE PYELOGRAM AND INSERTION STENT URETERAL   Operative Findings:  Renal pelvis stone lodged at a stenotic left UPJ, laser lithotripsy performed with fragmentation settings, all basket sized fragments removed, dust and small sand remains (too small to be grasped with the tines of the basket). 6 fr x 28 cm left ureteral stent placed, planned dwell time 2-3 weeks    1/9 Urology Note   POD 1 cystoscopy, ureteroscopy, holmium laser lithotripsy, retrograde pyelogram insertion of L ureteral stent by Dr. Arce, WBC 12, reactive.Flomax, oxybutinin, pyridium. Bladder scan then add oxybutinin. Likely discharge this afternoon pending stent colic pain.     1/9 IM Note   POD#1, Painful/pressure urination, Obtain bladder scan, if retaining, start oxybutynin. Start flomax. No BM in 48hrs . Wbc  12.12    1/10 Urology Note   POD 2 cystoscopy, ureteroscopy, holmium laser lithotripsy, retrograde pyelogram insertion of L ureteral stent  . Reports improvement today . Urology signing off . Stable for DC . Wbc dec to 10.28 from 12.12.     ED Treatment-Medication Administration from 01/07/2025 2051 to 01/07/2025 6540         Date/Time Order Dose Route Action     01/07/2025 8351 ketorolac (TORADOL) injection 15 mg 15 mg  Intravenous Given     01/07/2025 2122 ondansetron (ZOFRAN) injection 4 mg 4 mg Intravenous Given     01/07/2025 2123 HYDROmorphone (DILAUDID) injection 1 mg 1 mg Intravenous Given     01/07/2025 2225 metoclopramide (REGLAN) injection 10 mg 10 mg Intravenous Given     01/07/2025 2225 diphenhydrAMINE (BENADRYL) injection 25 mg 25 mg Intravenous Given     01/07/2025 2241 HYDROmorphone (DILAUDID) injection 1 mg 1 mg Intravenous Given     01/07/2025 2327 lactated ringers infusion 125 mL/hr Intravenous New Bag            Scheduled Medications:  docusate sodium, 100 mg, Oral, BID  enoxaparin, 40 mg, Subcutaneous, Q12H  fluticasone, 1 spray, Nasal, Daily  gabapentin, 300 mg, Oral, BID  loratadine, 10 mg, Oral, Daily  oxybutynin, 5 mg, Oral, TID  pantoprazole, 40 mg, Oral, Daily  phenazopyridine, 100 mg, Oral, TID With Meals  polyethylene glycol, 17 g, Oral, Daily  tamsulosin, 0.4 mg, Oral, Daily With Dinner      Continuous IV Infusions:  lactated ringers, 125 mL/hr, Intravenous, Continuous      PRN Meds:  acetaminophen, 650 mg, Oral, Q6H PRN  HYDROmorphone, 1 mg, Intravenous, Q6H PRN  1/8 x2 1/9 x1  ondansetron, 4 mg, Intravenous, Q6H PRN  oxyCODONE, 5 mg, Oral, Q6H PRN  oxyCODONE, 2.5 mg, Oral, Q6H PRN      ED Triage Vitals   Temperature Pulse Respirations Blood Pressure SpO2 Pain Score   01/07/25 2054 01/07/25 2054 01/07/25 2054 01/07/25 2054 01/07/25 2054 01/07/25 2123   (!) 97.2 °F (36.2 °C) 85 18 161/76 95 % 9     Weight (last 2 days)       Date/Time Weight    01/09/25 0046 131 (288.8)            Vital Signs (last 3 days)       Date/Time Temp Pulse Resp BP MAP (mmHg) SpO2 Calculated FIO2 (%) - Nasal Cannula Nasal Cannula O2 Flow Rate (L/min) O2 Device Cardiac (WDL) Patient Position - Orthostatic VS Pain    01/10/25 0950 -- -- -- -- -- -- -- -- -- -- -- 3    01/10/25 07:58:34 98 °F (36.7 °C) 86 -- 133/68 90 95 % -- -- -- -- -- --    01/09/25 2598 -- -- -- -- -- -- -- -- -- -- -- 5    01/09/25 2301 -- -- -- -- -- -- --  -- -- -- -- 5 01/09/25 21:41:39 98.4 °F (36.9 °C) 92 -- 131/65 87 95 % -- -- -- -- -- --    01/09/25 1902 -- -- -- -- -- -- -- -- -- -- -- 7 01/09/25 15:35:31 98.5 °F (36.9 °C) 99 -- 110/64 79 96 % -- -- -- -- -- --    01/09/25 15:34:27 98.5 °F (36.9 °C) 104 -- -- -- 95 % -- -- -- -- -- --    01/09/25 1016 -- -- -- -- -- -- -- -- -- -- -- 7 01/09/25 0821 -- -- -- -- -- -- -- -- None (Room air) -- -- 6 01/09/25 08:06:03 97.7 °F (36.5 °C) 94 -- 151/78 102 96 % -- -- -- -- -- --    01/09/25 08:05:03 97.7 °F (36.5 °C) 92 -- 151/78 102 97 % -- -- -- -- -- --    01/09/25 0527 -- -- -- -- -- -- -- -- -- -- -- 7 01/09/25 0325 -- -- -- -- -- -- -- -- -- -- -- 9 01/09/25 0046 98.3 °F (36.8 °C) 97 15 133/72 -- -- -- -- -- -- -- --    01/08/25 22:18:04 98.3 °F (36.8 °C) 97 15 133/72 92 95 % -- -- -- -- -- --    01/08/25 2203 -- -- -- -- -- 95 % -- -- None (Room air) -- -- 7 01/08/25 1724 -- -- -- -- -- -- -- -- -- -- -- 7 01/08/25 1538 -- -- -- -- -- -- -- -- -- -- -- 8 01/08/25 15:35:59 98.3 °F (36.8 °C) 84 -- 121/71 88 92 % -- -- -- -- -- --    01/08/25 1415 -- 92 16 138/84 -- 95 % -- -- None (Room air) WDL -- 5 01/08/25 1400 -- 84 14 144/75 -- 94 % 28 2 L/min Nasal cannula WDL -- 4 01/08/25 1352 -- -- -- -- -- -- -- -- -- -- -- No Pain    01/08/25 1340 98.5 °F (36.9 °C) 90 14 145/73 -- 94 % 28 2 L/min Nasal cannula WDL -- No Pain    01/08/25 1138 97.6 °F (36.4 °C) 79 20 126/68 -- 97 % -- -- None (Room air) -- -- 4 01/08/25 0900 -- -- -- -- -- -- -- -- None (Room air) -- -- 3    01/08/25 08:30:40 97.7 °F (36.5 °C) 76 -- 118/69 85 94 % -- -- -- -- -- --    01/08/25 0814 -- -- -- -- -- -- -- -- -- -- -- 4 01/08/25 00:01:05 98.3 °F (36.8 °C) 82 -- 120/69 86 92 % -- -- -- -- -- 3    01/07/25 2308 -- 78 16 117/56 76 92 % -- -- None (Room air) -- Sitting --    01/07/25 2241 -- -- -- -- -- -- -- -- -- -- -- 5 01/07/25 2140 -- -- -- -- -- -- -- -- None (Room air) -- -- --    01/07/25 2123 --  -- -- -- -- -- -- -- -- -- -- 9    01/07/25 2054 97.2 °F (36.2 °C) 85 18 161/76 -- 95 % -- -- None (Room air) -- Sitting --              Pertinent Labs/Diagnostic Test Results:   Radiology:  FL retrograde pyelogram   Final Interpretation by Louie Patton MD (01/08 1606)      Fluoroscopic guidance provided for left retrograde pyelogram.      Please see separate procedure report for additional details.         Workstation performed: [id number]         CT renal stone study abdomen pelvis wo contrast   Final Interpretation by Wilner Corral MD (01/07 2203)         1. Obstructing 10 x 7 mm left UPJ calculus with mild upstream hydronephrosis and mild perinephric stranding. Nonobstructing 6 mm left lower pole renal calculus.   2. Additional stable findings as noted.      The study was marked in EPIC for immediate notification.         Workstation performed: [id number]           Cardiology:  No orders to display     GI:  No orders to display           Results from last 7 days   Lab Units 01/10/25  0546 01/09/25  0453 01/08/25  0506 01/07/25  2131   WBC Thousand/uL 10.28* 12.12* 8.82 7.90   HEMOGLOBIN g/dL 13.4 14.0 14.0 14.9   HEMATOCRIT % 41.1 43.2 42.9 44.0   PLATELETS Thousands/uL 202 231 205 230   TOTAL NEUT ABS Thousands/µL  --  10.17*  --  4.29         Results from last 7 days   Lab Units 01/10/25  0546 01/09/25  0453 01/08/25  0506 01/07/25  2131   SODIUM mmol/L 138 138 140 138   POTASSIUM mmol/L 3.6 3.9 4.1 3.8   CHLORIDE mmol/L 104 105 109* 107   CO2 mmol/L 28 27 28 25   ANION GAP mmol/L 6 6 3* 6   BUN mg/dL 12 13 14 14   CREATININE mg/dL 0.69 0.80 0.90 0.95   EGFR ml/min/1.73sq m 109 103 98 92   CALCIUM mg/dL 9.4 9.3 9.5 10.0     Results from last 7 days   Lab Units 01/07/25  2131   AST U/L 27   ALT U/L 38   ALK PHOS U/L 73   TOTAL PROTEIN g/dL 6.0*   ALBUMIN g/dL 4.1   TOTAL BILIRUBIN mg/dL 0.48       Results from last 7 days   Lab Units 01/10/25  0546 01/09/25  0453 01/08/25  0506  01/07/25  2131   GLUCOSE RANDOM mg/dL 107 117 102 161*       Results from last 7 days   Lab Units 01/07/25  2222   CLARITY UA  Clear   COLOR UA  Yellow   SPEC GRAV UA  1.025   PH UA  5.5   GLUCOSE UA mg/dl Negative   KETONES UA mg/dl Negative   BLOOD UA  Small*   PROTEIN UA mg/dl Negative   NITRITE UA  Negative   BILIRUBIN UA  Negative   UROBILINOGEN UA (BE) mg/dl <2.0   LEUKOCYTES UA  Negative   WBC UA /hpf 2-4*   RBC UA /hpf 10-20*   BACTERIA UA /hpf None Seen   EPITHELIAL CELLS WET PREP /hpf Occasional   MUCUS THREADS  Occasional*         Past Medical History:   Diagnosis Date    Anxiety and depression     Colon polyp     Depression     Esophageal reflux     Fracture of both hands     Hair loss     Heartburn     Hiatal hernia     Resolved 7/24/2015     Night sweats     Sleep disturbance     Starting and stopping of urinary stream during micturition     Stomach problems     Weight gain      Present on Admission:   Ureterolithiasis   GERD (gastroesophageal reflux disease)   Allergic rhinitis   Obesity, Class III, BMI 40-49.9 (morbid obesity) ([facility])      Admitting Diagnosis: Ureterolithiasis [N20.1]  Flank pain [R10.9]  Intractable pain [R52]  Hydronephrosis with ureteropelvic junction (UPJ) obstruction [Q62.11]  Age/Sex: 49 y.o. male    Network Utilization Review Department  ATTENTION: Please call with any questions or concerns to [phone] and carefully listen to the prompts so that you are directed to the right person. All voicemails are confidential.   For Discharge needs, contact Care Management DC Support Team at [phone] opt. [phone]  Send all requests for admission clinical reviews, approved or denied determinations and any other requests to dedicated fax number below belonging to the campus where the patient is receiving treatment. List of dedicated fax numbers for the Facilities:  FACILITY NAME UR FAX NUMBER   ADMISSION DENIALS (Administrative/Medical Necessity) [phone]   DISCHARGE SUPPORT TEAM  (NETWORK) [phone]   PARENT CHILD HEALTH (Maternity/NICU/Pediatrics) [phone]   [facility] [phone]   [facility] [phone]   [facility] [phone]   [facility] [phone]   [facility] [phone]   [facility] [phone]   [facility] [phone]   [facility] [phone]   [facility] [phone]   [facility] [phone]   [facility] [phone]   [facility] [phone]

## 2025-01-09 NOTE — PROGRESS NOTES
"Progress Note - Urology   Name: Antonio Garrison 49 y.o. male I MRN: [id number]  Unit/Bed#: -01 I Date of Admission: 1/7/2025   Date of Service: 1/9/2025 I Hospital Day: 0     Assessment & Plan  Ureterolithiasis  Obstructing 10x 7 mm left UPJ calculus with mild hydronephrosis  POD 1 cystoscopy, ureteroscopy, holmium laser lithotripsy, retrograde pyelogram insertion of L ureteral stent by Dr. Arce  WBC 12, reactive  Cr stable  Stent for 2-3 weeks  Stent related colic, frequency, pain, dysuria  Flomax, oxybutinin, pyridium. Bladder scan then add oxybutinin  Likely discharge this afternoon pending stent colic pain.     Subjective:   HPI: seen at bedside. Reports pain, worse during urination that goes up to kidney. Dysuria.     Review of Systems   Constitutional:  Negative for chills and fever.   Respiratory:  Negative for cough and shortness of breath.    Cardiovascular:  Negative for chest pain and palpitations.   Gastrointestinal:  Positive for abdominal pain. Negative for vomiting.   Genitourinary:  Positive for dysuria and flank pain. Negative for hematuria.   Musculoskeletal:  Negative for arthralgias and back pain.   Skin:  Negative for color change and rash.   Neurological:  Negative for seizures and syncope.   All other systems reviewed and are negative.      Objective:    Vitals: Blood pressure 151/78, pulse 94, temperature 97.7 °F (36.5 °C), resp. rate 15, height 5' 10\" (1.778 m), weight 131 kg (288 lb 12.8 oz), SpO2 96%.,Body mass index is 41.44 kg/m².    Physical Exam  Constitutional:       General: He is not in acute distress.     Appearance: Normal appearance. He is normal weight. He is not ill-appearing or toxic-appearing.   HENT:      Head: Normocephalic and atraumatic.      Right Ear: External ear normal.      Left Ear: External ear normal.      Nose: Nose normal.      Mouth/Throat:      Mouth: Mucous membranes are moist.   Eyes:      General: No scleral icterus.     Conjunctiva/sclera: " Conjunctivae normal.   Cardiovascular:      Pulses: Normal pulses.   Pulmonary:      Effort: Pulmonary effort is normal.   Abdominal:      General: Abdomen is flat. There is no distension.      Palpations: Abdomen is soft.      Tenderness: There is abdominal tenderness. There is no right CVA tenderness, left CVA tenderness or guarding.   Neurological:      General: No focal deficit present.      Mental Status: He is alert and oriented to person, place, and time. Mental status is at baseline.   Psychiatric:         Mood and Affect: Mood normal.         Behavior: Behavior normal.         Thought Content: Thought content normal.         Judgment: Judgment normal.             Labs:  Recent Labs     01/07/25 2131 01/08/25  0506 01/09/25  0453   WBC 7.90 8.82 12.12*       Recent Labs     01/07/25 2131 01/08/25  0506 01/09/25  0453   HGB 14.9 14.0 14.0     Recent Labs     01/07/25 2131 01/08/25  0506 01/09/25  0453   HCT 44.0 42.9 43.2     Recent Labs     01/07/25 2131 01/08/25  0506 01/09/25  0453   CREATININE 0.95 0.90 0.80         History:    Past Medical History:   Diagnosis Date    Anxiety and depression     Colon polyp     Depression     Esophageal reflux     Fracture of both hands     Hair loss     Heartburn     Hiatal hernia     Resolved 7/24/2015     Night sweats     Sleep disturbance     Starting and stopping of urinary stream during micturition     Stomach problems     Weight gain      Social History     Socioeconomic History    Marital status: /Civil Union     Spouse name: Not on file    Number of children: Not on file    Years of education: Not on file    Highest education level: High school graduate   Occupational History    Occupation:    Tobacco Use    Smoking status: Former     Average packs/day: 1 pack/day for 28.0 years (28.0 ttl pk-yrs)     Types: Cigarettes     Start date: 1991    Smokeless tobacco: Never   Vaping Use    Vaping status: Never Used   Substance and Sexual Activity     Alcohol use: Yes     Comment: weekends    Drug use: Not Currently     Comment: Quit - As per [employer]     Sexual activity: Yes   Other Topics Concern    Not on file   Social History Narrative    Alcohol use: Very seldom - As per [employer]    Cafeine use    Lives with roommates: Girlfriend and her son    No known STD risk factors    Social alcohol use - As per [employer]      Social Drivers of Health     Financial Resource Strain: Not on file   Food Insecurity: No Food Insecurity (2025)    Nursing - Inadequate Food Risk Classification     Worried About Running Out of Food in the Last Year: Not on file     Ran Out of Food in the Last Year: Not on file     Ran Out of Food in the Last Year: Never true   Transportation Needs: No Transportation Needs (2025)    Nursing - Transportation Risk Classification     Lack of Transportation: Not on file     Lack of Transportation: No   Physical Activity: Not on file   Stress: Not on file   Social Connections: Not on file   Intimate Partner Violence: Unknown (2025)    Nursing IPS     Feels Physically and Emotionally Safe: Not on file     Physically Hurt by Someone: Not on file     Humiliated or Emotionally Abused by Someone: Not on file     Physically Hurt by Someone: No     Hurt or Threatened by Someone: No   Housing Stability: Unknown (2025)    Nursing: Inadequate Housing Risk Classification     Has Housing: Not on file     Worried About Losing Housing: Not on file     Unable to Get Utilities: Not on file     Unable to Pay for Housing in the Last Year: No     Has Housin     Past Surgical History:   Procedure Laterality Date    APPENDECTOMY      COLONOSCOPY      ESOPHAGOGASTRODUODENOSCOPY      Diagnostic     FL RETROGRADE PYELOGRAM  2025    HAND SURGERY      Right in  and left in      NOSE SURGERY      Nasal septal deviation repair     FL CYSTO/URETERO W/LITHOTRIPSY &INDWELL STENT INSRT Left 2025    Procedure: CYSTOSCOPY URETEROSCOPY WITH  LITHOTRIPSY HOLMIUM LASER, BASKET STONE EXTRACTION, RETROGRADE PYELOGRAM AND INSERTION STENT URETERAL;  Surgeon: Félix Arce MD;  Location: [facility];  Service: Urology     Family History   Problem Relation Age of Onset    No Known Problems Mother     Diabetes Father     Diabetes Maternal Uncle     Heart disease Maternal Grandmother     Diabetes Maternal Grandmother     Pancreatic cancer Maternal Grandmother     Colon cancer Maternal Grandfather     Dementia Paternal Grandmother        Sofi Mccartney PA-C  Date: 1/9/2025 Time: 9:33 AM

## 2025-01-09 NOTE — PROGRESS NOTES
Progress Note - Hospitalist   Name: Antonio Garrison 49 y.o. male I MRN: [id number]  Unit/Bed#: -01 I Date of Admission: 1/7/2025   Date of Service: 1/9/2025 I Hospital Day: 0    Assessment & Plan  Ureterolithiasis    Urology consulted, Left - CYSTOSCOPY URETEROSCOPY WITH LITHOTRIPSY HOLMIUM LASER. BASKET STONE EXTRACTION. RETROGRADE PYELOGRAM AND INSERTION STENT URETERAL   POD#1  Painful/pressure urination  Obtain bladder scan, if retaining, start oxybutynin  Start flomax  Continue pain control  UA shows no bacteria, no indication for antibiotics  Cont IV fluids and generous po hydration  GERD (gastroesophageal reflux disease)  Continue PTA Protonix  Allergic rhinitis  Continue PTA Flonase and cetirizine  Obesity, Class III, BMI 40-49.9 (morbid obesity) ([facility])  BMI 41.32  Carb controlled diet when patient is not n.p.o. for procedure  Recommend exercise and healthy diet      Constipation: bowel regimen ordered    VTE Pharmacologic Prophylaxis: VTE Score: 3 Moderate Risk (Score 3-4) - Pharmacological DVT Prophylaxis Ordered: enoxaparin (Lovenox).    Mobility:   Basic Mobility Inpatient Raw Score: 24  JH-HLM Goal: 8: Walk 250 feet or more  JH-HLM Achieved: 3: Sit at edge of bed  JH-HLM Goal NOT achieved. Continue with multidisciplinary rounding and encourage appropriate mobility to improve upon JH-HLM goals.    Patient Centered Rounds: I performed bedside rounds with nursing staff today.   Discussions with Specialists or Other Care Team Provider: urology    Education and Discussions with Family / Patient:  offered to call but pt declined.     Current Length of Stay: 0 day(s)  Current Patient Status: Inpatient   Certification Statement: The patient will continue to require additional inpatient hospital stay due to continues to have severe pressure and discomfort on urination  Discharge Plan: Anticipate discharge tomorrow to home.    Code Status: Level 1 - Full Code    Subjective   Pt seen and examined at bedside  during am rounds  C/o bladder pain and painful urination  No blood in  urine  No fever  No BM x 48 hours  No events reported overnight      Objective :  Temp:  [97.7 °F (36.5 °C)-98.5 °F (36.9 °C)] 98.5 °F (36.9 °C)  HR:  [] 99  BP: (110-151)/(64-78) 110/64  Resp:  [15] 15  SpO2:  [95 %-97 %] 96 %  O2 Device: None (Room air)    Body mass index is 41.44 kg/m².     Input and Output Summary (last 24 hours):     Intake/Output Summary (Last 24 hours) at 1/9/2025 1620  Last data filed at 1/9/2025 1300  Gross per 24 hour   Intake 1170 ml   Output 2600 ml   Net -1430 ml       Physical Exam  General- Awake, alert and oriented x 3, looks comfortable  HEENT- Normocephalic, atraumatic, oral mucosa- moist  Neck- Supple, No carotid bruit, no JVD  CVS- Normal S1/ S2, Regular rate and rhythm, No murmur, No edema  Respiratory system- B/L clear breath sounds, no wheezing  Abdomen- Soft, Non distended, no tenderness, Bowel sound- present 4 quads  Genitourinary- (+) suprapubic tenderness, No CVA tenderness  Skin- No new bruise or rash  Musculoskeletal- No gross deformity  Psych- No acute psychosis  CNS- CN II- XII grossly intact, No acute focal neurologic deficit noted  ,    Lines/Drains:  Lines/Drains/Airways       Active Status       Name Placement date Placement time Site Days    Ureteral Internal Stent Left ureter 6 Fr. 01/08/25  1321  Left ureter  1                            Lab Results: I have reviewed the following results:   Results from last 7 days   Lab Units 01/09/25  0453   WBC Thousand/uL 12.12*   HEMOGLOBIN g/dL 14.0   HEMATOCRIT % 43.2   PLATELETS Thousands/uL 231   SEGS PCT % 84*   LYMPHO PCT % 8*   MONO PCT % 8   EOS PCT % 0     Results from last 7 days   Lab Units 01/09/25  0453 01/08/25  0506 01/07/25  2131   SODIUM mmol/L 138   < > 138   POTASSIUM mmol/L 3.9   < > 3.8   CHLORIDE mmol/L 105   < > 107   CO2 mmol/L 27   < > 25   BUN mg/dL 13   < > 14   CREATININE mg/dL 0.80   < > 0.95   ANION GAP mmol/L 6   < >  6   CALCIUM mg/dL 9.3   < > 10.0   ALBUMIN g/dL  --   --  4.1   TOTAL BILIRUBIN mg/dL  --   --  0.48   ALK PHOS U/L  --   --  73   ALT U/L  --   --  38   AST U/L  --   --  27   GLUCOSE RANDOM mg/dL 117   < > 161*    < > = values in this interval not displayed.                       Recent Cultures (last 7 days):         Imaging Results Review: No pertinent imaging studies reviewed.  Other Study Results Review: No additional pertinent studies reviewed.    Last 24 Hours Medication List:     Current Facility-Administered Medications:     acetaminophen (TYLENOL) tablet 650 mg, Q6H PRN    bisacodyl (DULCOLAX) rectal suppository 10 mg, Once    fluticasone (FLONASE) 50 mcg/act nasal spray 1 spray, Daily    gabapentin (NEURONTIN) capsule 300 mg, BID    HYDROmorphone (DILAUDID) injection 1 mg, Q6H PRN    lactated ringers infusion, Continuous, Last Rate: 125 mL/hr (01/09/25 0705)    loratadine (CLARITIN) tablet 10 mg, Daily    ondansetron (ZOFRAN) injection 4 mg, Q6H PRN    oxybutynin (DITROPAN) tablet 5 mg, TID    oxyCODONE (ROXICODONE) IR tablet 5 mg, Q6H PRN    oxyCODONE (ROXICODONE) split tablet 2.5 mg, Q6H PRN    pantoprazole (PROTONIX) EC tablet 40 mg, Daily    phenazopyridine (PYRIDIUM) tablet 100 mg, TID With Meals    polyethylene glycol (MIRALAX) packet 17 g, Daily    tamsulosin (FLOMAX) capsule 0.4 mg, Daily With Dinner    Administrative Statements   Today, Patient Was Seen By: Kenny Cuadra MD  I have spent a total time of 50 minutes in caring for this patient on the day of the visit/encounter including Diagnostic results, Prognosis, Risks and benefits of tx options, Instructions for management, Patient and family education, Importance of tx compliance, Risk factor reductions, Impressions, Counseling / Coordination of care, Documenting in the medical record, Reviewing / ordering tests, medicine, procedures  , Obtaining or reviewing history  , and Communicating with other healthcare professionals .    **Please Note:  This note may have been constructed using a voice recognition system.**

## 2025-01-10 VITALS
TEMPERATURE: 98 F | HEART RATE: 86 BPM | SYSTOLIC BLOOD PRESSURE: 133 MMHG | RESPIRATION RATE: 15 BRPM | BODY MASS INDEX: 41.35 KG/M2 | WEIGHT: 288.8 LBS | OXYGEN SATURATION: 95 % | DIASTOLIC BLOOD PRESSURE: 68 MMHG | HEIGHT: 70 IN

## 2025-01-10 LAB
ANION GAP SERPL CALCULATED.3IONS-SCNC: 6 MMOL/L (ref 4–13)
BUN SERPL-MCNC: 12 MG/DL (ref 5–25)
CALCIUM SERPL-MCNC: 9.4 MG/DL (ref 8.4–10.2)
CHLORIDE SERPL-SCNC: 104 MMOL/L (ref 96–108)
CO2 SERPL-SCNC: 28 MMOL/L (ref 21–32)
CREAT SERPL-MCNC: 0.69 MG/DL (ref 0.6–1.3)
ERYTHROCYTE [DISTWIDTH] IN BLOOD BY AUTOMATED COUNT: 12.4 % (ref 11.6–15.1)
GFR SERPL CREATININE-BSD FRML MDRD: 109 ML/MIN/1.73SQ M
GLUCOSE SERPL-MCNC: 107 MG/DL (ref 65–140)
HCT VFR BLD AUTO: 41.1 % (ref 36.5–49.3)
HGB BLD-MCNC: 13.4 G/DL (ref 12–17)
MCH RBC QN AUTO: 29.3 PG (ref 26.8–34.3)
MCHC RBC AUTO-ENTMCNC: 32.6 G/DL (ref 31.4–37.4)
MCV RBC AUTO: 90 FL (ref 82–98)
PLATELET # BLD AUTO: 202 THOUSANDS/UL (ref 149–390)
PMV BLD AUTO: 10 FL (ref 8.9–12.7)
POTASSIUM SERPL-SCNC: 3.6 MMOL/L (ref 3.5–5.3)
RBC # BLD AUTO: 4.57 MILLION/UL (ref 3.88–5.62)
SODIUM SERPL-SCNC: 138 MMOL/L (ref 135–147)
WBC # BLD AUTO: 10.28 THOUSAND/UL (ref 4.31–10.16)

## 2025-01-10 PROCEDURE — 80048 BASIC METABOLIC PNL TOTAL CA: CPT | Performed by: FAMILY MEDICINE

## 2025-01-10 PROCEDURE — 99239 HOSP IP/OBS DSCHRG MGMT >30: CPT | Performed by: FAMILY MEDICINE

## 2025-01-10 PROCEDURE — 99232 SBSQ HOSP IP/OBS MODERATE 35: CPT

## 2025-01-10 PROCEDURE — 85027 COMPLETE CBC AUTOMATED: CPT | Performed by: FAMILY MEDICINE

## 2025-01-10 RX ORDER — OXYCODONE HYDROCHLORIDE 5 MG/1
5 TABLET ORAL EVERY 4 HOURS PRN
Qty: 5 TABLET | Refills: 0 | Status: SHIPPED | OUTPATIENT
Start: 2025-01-10 | End: 2025-01-17

## 2025-01-10 RX ORDER — BISACODYL 5 MG/1
5 TABLET, DELAYED RELEASE ORAL DAILY PRN
Status: DISCONTINUED | OUTPATIENT
Start: 2025-01-10 | End: 2025-01-10 | Stop reason: HOSPADM

## 2025-01-10 RX ORDER — ACETAMINOPHEN 500 MG
500 TABLET ORAL EVERY 6 HOURS PRN
Qty: 20 TABLET | Refills: 0 | Status: SHIPPED | OUTPATIENT
Start: 2025-01-10 | End: 2025-01-15

## 2025-01-10 RX ORDER — OXYBUTYNIN CHLORIDE 5 MG/1
5 TABLET ORAL 3 TIMES DAILY
Qty: 90 TABLET | Refills: 0 | Status: SHIPPED | OUTPATIENT
Start: 2025-01-10

## 2025-01-10 RX ORDER — DOCUSATE SODIUM 100 MG/1
100 CAPSULE, LIQUID FILLED ORAL 2 TIMES DAILY
Qty: 60 CAPSULE | Refills: 0 | Status: SHIPPED | OUTPATIENT
Start: 2025-01-10 | End: 2025-01-17

## 2025-01-10 RX ADMIN — FLUTICASONE PROPIONATE 1 SPRAY: 50 SPRAY, METERED NASAL at 09:52

## 2025-01-10 RX ADMIN — ENOXAPARIN SODIUM 40 MG: 40 INJECTION SUBCUTANEOUS at 06:16

## 2025-01-10 RX ADMIN — SIMETHICONE 80 MG: 80 TABLET, CHEWABLE ORAL at 11:03

## 2025-01-10 RX ADMIN — OXYBUTYNIN CHLORIDE 5 MG: 5 TABLET ORAL at 09:49

## 2025-01-10 RX ADMIN — LORATADINE 10 MG: 10 TABLET ORAL at 09:49

## 2025-01-10 RX ADMIN — PANTOPRAZOLE SODIUM 40 MG: 40 TABLET, DELAYED RELEASE ORAL at 09:49

## 2025-01-10 RX ADMIN — DOCUSATE SODIUM 100 MG: 100 CAPSULE, LIQUID FILLED ORAL at 09:49

## 2025-01-10 RX ADMIN — PHENAZOPYRIDINE 100 MG: 100 TABLET ORAL at 09:54

## 2025-01-10 RX ADMIN — BISACODYL 5 MG: 5 TABLET, COATED ORAL at 11:03

## 2025-01-10 NOTE — ASSESSMENT & PLAN NOTE
Urology consulted, Left - CYSTOSCOPY URETEROSCOPY WITH LITHOTRIPSY HOLMIUM LASER. BASKET STONE EXTRACTION. RETROGRADE PYELOGRAM AND INSERTION STENT URETERAL   POD#2  Symptoms resolved today  No urinary retention  Start flomax  Continue pain control  UA shows no bacteria, no indication for antibiotics  Cont IV fluids and generous po hydration  Constipation noted- started on Bowel regimen and continued at DC

## 2025-01-10 NOTE — DISCHARGE SUMMARY
"Discharge Summary - Hospitalist   Name: Antonio Garrison 49 y.o. male I MRN: [id number]  Unit/Bed#: -01 I Date of Admission: 1/7/2025   Date of Service: 1/10/2025 I Hospital Day: 1     Assessment & Plan  Ureterolithiasis    Urology consulted, Left - CYSTOSCOPY URETEROSCOPY WITH LITHOTRIPSY HOLMIUM LASER. BASKET STONE EXTRACTION. RETROGRADE PYELOGRAM AND INSERTION STENT URETERAL   POD#2  Symptoms resolved today  No urinary retention  Start flomax  Continue pain control  UA shows no bacteria, no indication for antibiotics  Cont IV fluids and generous po hydration  Constipation noted- started on Bowel regimen and continued at MN  GERD (gastroesophageal reflux disease)  Continue PTA Protonix  Allergic rhinitis  Continue PTA Flonase and cetirizine  Obesity, Class III, BMI 40-49.9 (morbid obesity) ([facility])  BMI 41.32  Carb controlled diet when patient is not n.p.o. for procedure  Recommend exercise and healthy diet     Medical Problems       Resolved Problems  Date Reviewed: 1/9/2025   None       Discharging Physician / Practitioner: Kenny Cuadra MD  PCP: Kimberly Dinh MD  Admission Date:   Admission Orders (From admission, onward)       Ordered        01/09/25 1620  INPATIENT ADMISSION  Once            01/07/25 2250  Place in Observation  Once                          Discharge Date: 01/10/25    Consultations During Hospital Stay:  IP CONSULT TO UROLOGY    Procedures Performed:   As above    Significant Findings / Test Results:   As abvoe    Incidental Findings:   alisson       Test Results Pending at Discharge (will require follow up):   none     Outpatient Tests Requested:  none    Complications:  none    Reason for Admission: \"    L flank pain, has known kidney stones. Pain started 1 hr ago        Hospital Course:   Antonio Garrison is a 49 y.o. male patient who originally presented to the hospital on 1/7/2025 due to the above-mentioned symptoms.  Patient was seen by urolog and underwent the following: CYSTOSCOPY " "URETEROSCOPY WITH LITHOTRIPSY HOLMIUM LASER. BASKET STONE EXTRACTION. RETROGRADE PYELOGRAM AND INSERTION STENT URETERAL patient is now postop day 2.  On postop day 1 he had symptoms of pressure and pain on urination with his now resolved after initiating Flomax and oxybutynin.  Patient was also given Pyridium and now has symptom resolution.  He is constipated and had a bowel movement today though not satisfactory per patient.  Patient recommended to eat a high-fiber diet and drink plenty fluids and take stool softeners at home.  He is cleared for discharge by urology.  Patient given prescription for Bactrim which she is to take 3 days before the ureteral stent removal is scheduled.  Medically stable for DC      Please see above list of diagnoses and related plan for additional information.     Condition at Discharge: stable    Discharge Day Visit / Exam:   Subjective:  \"I feel fine today\"  Vitals: Blood Pressure: 133/68 (01/10/25 0758)  Pulse: 86 (01/10/25 0758)  Temperature: 98 °F (36.7 °C) (01/10/25 0758)  Temp Source: Temporal (01/09/25 0046)  Respirations: 15 (01/09/25 0046)  Height: 5' 10\" (177.8 cm) (01/09/25 0046)  Weight - Scale: 131 kg (288 lb 12.8 oz) (01/09/25 0046)  SpO2: 95 % (01/10/25 0758)  Physical Exam General- Awake, alert and oriented x 3, looks comfortable  HEENT- Normocephalic, atraumatic, oral mucosa- moist  Neck- Supple, No carotid bruit, no JVD  CVS- Normal S1/ S2, Regular rate and rhythm, No murmur, No edema  Respiratory system- B/L clear breath sounds, no wheezing  Abdomen- Soft, chronic distended vs from constipation, no tenderness, Bowel sound- present 4 quads  Genitourinary- No suprapubic tenderness, No CVA tenderness  Skin- No new bruise or rash  Musculoskeletal- No gross deformity  Psych- No acute psychosis  CNS- CN II- XII grossly intact, No acute focal neurologic deficit noted      Discussion with Family:  offered to call, pt declined.     Discharge instructions/Information to " patient and family:   See after visit summary for information provided to patient and family.      Provisions for Follow-Up Care:  See after visit summary for information related to follow-up care and any pertinent home health orders.      Mobility at time of Discharge:   Basic Mobility Inpatient Raw Score: 24  JH-HLM Goal: 8: Walk 250 feet or more  JH-HLM Achieved: 7: Walk 25 feet or more  HLM Goal achieved. Continue to encourage appropriate mobility.     Disposition:   Home    Planned Readmission: no    Discharge Medications:  See after visit summary for reconciled discharge medications provided to patient and/or family.      Administrative Statements   Discharge Statement:  I have spent a total time of 75 minutes in caring for this patient on the day of the visit/encounter. >30 minutes of time was spent on: Diagnostic results, Prognosis, Risks and benefits of tx options, Instructions for management, Patient and family education, Importance of tx compliance, Risk factor reductions, Impressions, Counseling / Coordination of care, Documenting in the medical record, Reviewing / ordering tests, medicine, procedures  , and Communicating with other healthcare professionals .    **Please Note: This note may have been constructed using a voice recognition system**

## 2025-01-10 NOTE — CASE MANAGEMENT
Case Management Progress Note    Patient name Antonio Garrison  Location /-01 MRN [id number]  : 1975 Date 1/10/2025       LOS (days): 1  Geometric Mean LOS (GMLOS) (days):   Days to GMLOS:        OBJECTIVE:        Current admission status: Inpatient  Preferred Pharmacy:   [id number] - [place] - [address] [facility] [address]  Phone: [phone] Fax: [phone]    Primary Care Provider: Kimberly Dinh MD    Primary Insurance: [employer]  Secondary Insurance:     PROGRESS NOTE:  Discussed patient's case in interdisciplinary rounds this morning with [facility] provider. Patient is medically cleared for discharge to home today, no needs. CM will continue to follow.

## 2025-01-10 NOTE — PROGRESS NOTES
"Progress Note - Urology   Name: Antonio Garrison 49 y.o. male I MRN: [id number]  Unit/Bed#: -01 I Date of Admission: 1/7/2025   Date of Service: 1/10/2025 I Hospital Day: 1     Assessment & Plan  Ureterolithiasis  Obstructing 10x 7 mm left UPJ calculus with mild hydronephrosis  POD 2 cystoscopy, ureteroscopy, holmium laser lithotripsy, retrograde pyelogram insertion of L ureteral stent by Dr. Arce  Required additional night stay due to stent colic  Today reports improvement  Reports adverse symptoms w/ flomax, urgency, frequency. Discussed this is likely due to stent rather than flomax  Continue oxybutinin, pyridium. Oxycodone as needed.   Stable for discharge.   Urology will sign off but remain available for any further inpatient needs. Please feel free to contact the provider currently covering the Urology [facility] role for this campus with questions or concerns.       Subjective:   HPI: seen at bedside. Reports after he took flomax yesterday can intense urgency, leakage, frequency. Improvement in symptoms today. Did require oxycodone this AM.     Review of Systems   Constitutional:  Negative for chills and fever.   Respiratory:  Negative for cough and shortness of breath.    Cardiovascular:  Negative for chest pain and palpitations.   Gastrointestinal:  Negative for abdominal pain and vomiting.   Genitourinary:  Negative for dysuria and hematuria.   Musculoskeletal:  Negative for arthralgias and back pain.   Skin:  Negative for color change and rash.   All other systems reviewed and are negative.      Objective:    Vitals: Blood pressure 133/68, pulse 86, temperature 98 °F (36.7 °C), resp. rate 15, height 5' 10\" (1.778 m), weight 131 kg (288 lb 12.8 oz), SpO2 95%.,Body mass index is 41.44 kg/m².    Physical Exam  Constitutional:       General: He is not in acute distress.     Appearance: He is normal weight. He is not ill-appearing or toxic-appearing.   HENT:      Head: Normocephalic and atraumatic. "      Right Ear: External ear normal.      Left Ear: External ear normal.      Nose: Nose normal.      Mouth/Throat:      Mouth: Mucous membranes are moist.   Eyes:      General: No scleral icterus.     Conjunctiva/sclera: Conjunctivae normal.   Cardiovascular:      Rate and Rhythm: Normal rate.      Pulses: Normal pulses.   Pulmonary:      Effort: Pulmonary effort is normal.   Neurological:      General: No focal deficit present.      Mental Status: He is oriented to person, place, and time. Mental status is at baseline.   Psychiatric:         Mood and Affect: Mood normal.         Behavior: Behavior normal.         Thought Content: Thought content normal.         Judgment: Judgment normal.       Labs:  Recent Labs     01/07/25 2131 01/08/25  0506 01/09/25  0453 01/10/25  0546   WBC 7.90 8.82 12.12* 10.28*       Recent Labs     01/07/25 2131 01/08/25  0506 01/09/25  0453 01/10/25  0546   HGB 14.9 14.0 14.0 13.4     Recent Labs     01/07/25 2131 01/08/25  0506 01/09/25  0453 01/10/25  0546   HCT 44.0 42.9 43.2 41.1     Recent Labs     01/07/25 2131 01/08/25  0506 01/09/25  0453 01/10/25  0546   CREATININE 0.95 0.90 0.80 0.69         History:    Past Medical History:   Diagnosis Date    Anxiety and depression     Colon polyp     Depression     Esophageal reflux     Fracture of both hands     Hair loss     Heartburn     Hiatal hernia     Resolved 7/24/2015     Night sweats     Sleep disturbance     Starting and stopping of urinary stream during micturition     Stomach problems     Weight gain      Social History     Socioeconomic History    Marital status: /Civil Union     Spouse name: Not on file    Number of children: Not on file    Years of education: Not on file    Highest education level: High school graduate   Occupational History    Occupation:    Tobacco Use    Smoking status: Former     Average packs/day: 1 pack/day for 28.0 years (28.0 ttl pk-yrs)     Types: Cigarettes     Start date: 1991     Smokeless tobacco: Never   Vaping Use    Vaping status: Never Used   Substance and Sexual Activity    Alcohol use: Yes     Comment: weekends    Drug use: Not Currently     Comment: Quit - As per [employer]     Sexual activity: Yes   Other Topics Concern    Not on file   Social History Narrative    Alcohol use: Very seldom - As per [employer]    Cafeine use    Lives with roommates: Girlfriend and her son    No known STD risk factors    Social alcohol use - As per [employer]      Social Drivers of Health     Financial Resource Strain: Not on file   Food Insecurity: No Food Insecurity (2025)    Nursing - Inadequate Food Risk Classification     Worried About Running Out of Food in the Last Year: Not on file     Ran Out of Food in the Last Year: Not on file     Ran Out of Food in the Last Year: Never true   Transportation Needs: No Transportation Needs (2025)    Nursing - Transportation Risk Classification     Lack of Transportation: Not on file     Lack of Transportation: No   Physical Activity: Not on file   Stress: Not on file   Social Connections: Not on file   Intimate Partner Violence: Unknown (2025)    Nursing IPS     Feels Physically and Emotionally Safe: Not on file     Physically Hurt by Someone: Not on file     Humiliated or Emotionally Abused by Someone: Not on file     Physically Hurt by Someone: No     Hurt or Threatened by Someone: No   Housing Stability: Unknown (2025)    Nursing: Inadequate Housing Risk Classification     Has Housing: Not on file     Worried About Losing Housing: Not on file     Unable to Get Utilities: Not on file     Unable to Pay for Housing in the Last Year: No     Has Housin     Past Surgical History:   Procedure Laterality Date    APPENDECTOMY      COLONOSCOPY      ESOPHAGOGASTRODUODENOSCOPY      Diagnostic     FL RETROGRADE PYELOGRAM  2025    HAND SURGERY      Right in  and left in      NOSE SURGERY      Nasal septal deviation repair     MI  CYSTO/URETERO W/LITHOTRIPSY &INDWELL STENT INSRT Left 1/8/2025    Procedure: CYSTOSCOPY URETEROSCOPY WITH LITHOTRIPSY HOLMIUM LASER, BASKET STONE EXTRACTION, RETROGRADE PYELOGRAM AND INSERTION STENT URETERAL;  Surgeon: Félix Arce MD;  Location: [facility];  Service: Urology     Family History   Problem Relation Age of Onset    No Known Problems Mother     Diabetes Father     Diabetes Maternal Uncle     Heart disease Maternal Grandmother     Diabetes Maternal Grandmother     Pancreatic cancer Maternal Grandmother     Colon cancer Maternal Grandfather     Dementia Paternal Grandmother        Sofi Mccartney PA-C  Date: 1/10/2025 Time: 9:34 AM

## 2025-01-10 NOTE — ASSESSMENT & PLAN NOTE
Obstructing 10x 7 mm left UPJ calculus with mild hydronephrosis  POD 2 cystoscopy, ureteroscopy, holmium laser lithotripsy, retrograde pyelogram insertion of L ureteral stent by Dr. Arce  Required additional night stay due to stent colic  Today reports improvement  Reports adverse symptoms w/ flomax, urgency, frequency. Discussed this is likely due to stent rather than flomax  Continue oxybutinin, pyridium. Oxycodone as needed.   Stable for discharge.   Urology will sign off but remain available for any further inpatient needs. Please feel free to contact the provider currently covering the Urology [facility] role for this campus with questions or concerns.

## 2025-01-13 ENCOUNTER — TRANSITIONAL CARE MANAGEMENT (OUTPATIENT)
Dept: FAMILY MEDICINE CLINIC | Facility: CLINIC | Age: 50
End: 2025-01-13

## 2025-01-14 LAB
CALCIUM OXALATE DIHYDRATE MFR STONE IR: 40 %
COLOR STONE: NORMAL
COM MFR STONE: 30 %
COMMENT-STONE3: NORMAL
COMPOSITION: NORMAL
HYDROXYAPATITE 24H ENGDIFF UR: 30 %
LABORATORY COMMENT REPORT: NORMAL
PHOTO: NORMAL
SIZE STONE: NORMAL MM
SPEC SOURCE SUBJ: NORMAL
STONE ANALYSIS-IMP: NORMAL
WT STONE: 107 MG

## 2025-01-16 ENCOUNTER — APPOINTMENT (OUTPATIENT)
Dept: LAB | Facility: HOSPITAL | Age: 50
End: 2025-01-16
Payer: COMMERCIAL

## 2025-01-16 LAB
BACTERIA UR QL AUTO: ABNORMAL /HPF
BILIRUB UR QL STRIP: NEGATIVE
CLARITY UR: ABNORMAL
COLOR UR: YELLOW
GLUCOSE UR STRIP-MCNC: NEGATIVE MG/DL
HGB UR QL STRIP.AUTO: ABNORMAL
KETONES UR STRIP-MCNC: NEGATIVE MG/DL
LEUKOCYTE ESTERASE UR QL STRIP: ABNORMAL
NITRITE UR QL STRIP: NEGATIVE
NON-SQ EPI CELLS URNS QL MICRO: ABNORMAL /HPF
PH UR STRIP.AUTO: 6.5 [PH]
PROT UR STRIP-MCNC: ABNORMAL MG/DL
RBC #/AREA URNS AUTO: ABNORMAL /HPF
SP GR UR STRIP.AUTO: 1.02 (ref 1–1.03)
UROBILINOGEN UR STRIP-ACNC: <2 MG/DL
WBC #/AREA URNS AUTO: ABNORMAL /HPF

## 2025-01-16 PROCEDURE — 81001 URINALYSIS AUTO W/SCOPE: CPT | Performed by: UROLOGY

## 2025-01-16 PROCEDURE — 87086 URINE CULTURE/COLONY COUNT: CPT | Performed by: UROLOGY

## 2025-01-16 NOTE — TELEPHONE ENCOUNTER
Patient's spouse called in stating patient has been experiencing dysuria as well as a constant pain in his urethra as well as leg pain to the point where he is having trouble walking. Patient was not with spouse so she was unable to answer all questions. She did report his pain being a 6 or 7 out of 10. Denies any fevers or other symptoms. Advised dysuria could be related to stent. Placed urine orders to rule out infection per request. ED precautions reviewed. Please advise on recommendations.

## 2025-01-17 ENCOUNTER — OFFICE VISIT (OUTPATIENT)
Dept: FAMILY MEDICINE CLINIC | Facility: CLINIC | Age: 50
End: 2025-01-17
Payer: COMMERCIAL

## 2025-01-17 VITALS
TEMPERATURE: 98.4 F | BODY MASS INDEX: 42.09 KG/M2 | WEIGHT: 294 LBS | HEIGHT: 70 IN | HEART RATE: 78 BPM | DIASTOLIC BLOOD PRESSURE: 78 MMHG | SYSTOLIC BLOOD PRESSURE: 116 MMHG | OXYGEN SATURATION: 98 %

## 2025-01-17 DIAGNOSIS — R39.9 UTI SYMPTOMS: Primary | ICD-10-CM

## 2025-01-17 DIAGNOSIS — N20.1 URETEROLITHIASIS: Primary | ICD-10-CM

## 2025-01-17 DIAGNOSIS — G95.0 SYRINGOMYELIA (HCC): ICD-10-CM

## 2025-01-17 PROBLEM — H69.91 DISORDER OF RIGHT EUSTACHIAN TUBE: Status: RESOLVED | Noted: 2024-05-29 | Resolved: 2025-01-17

## 2025-01-17 LAB — BACTERIA UR CULT: NORMAL

## 2025-01-17 PROCEDURE — 99495 TRANSJ CARE MGMT MOD F2F 14D: CPT | Performed by: FAMILY MEDICINE

## 2025-01-17 RX ORDER — CEPHALEXIN 500 MG/1
500 CAPSULE ORAL EVERY 12 HOURS SCHEDULED
Qty: 20 CAPSULE | Refills: 0 | Status: SHIPPED | OUTPATIENT
Start: 2025-01-17 | End: 2025-01-27

## 2025-01-17 NOTE — PROGRESS NOTES
"Transition of Care Visit  Name: Antonio Garrison      : 1975      MRN: [id number]  Encounter Provider: Kimberly Dinh MD  Encounter Date: 2025   Encounter department: [facility]    Assessment & Plan  Ureterolithiasis  Has follow up with Urology next week  On abx from urology advised to continue as directed by urology       Syringomyelia (HCC)  Seen on MRI - benign. No furtehr treatment recommended at this itme            History of Present Illness     Transitional Care Management Review:   Antonio Garrison is a 49 y.o. male here for TCM follow up.     During the TCM phone call patient stated:  TCM Call     Date and time call was made  2025 10:01 AM    Hospital care reviewed  Records reviewed    Patient [facility]ial[facility] at  [facility]    Date of Admission  25    Date of discharge  01/10/25    Diagnosis  Ureterolithiasis      TCM Call     I have advised the patient to call PCP with any new or worsening symptoms  Shahana Rueda MA        Here for TCM. Per discharging provider    \"Hospital Course:   Antonio Garrison is a 49 y.o. male patient who originally presented to the hospital on 2025 due to the above-mentioned symptoms.  Patient was seen by urolog and underwent the following: CYSTOSCOPY URETEROSCOPY WITH LITHOTRIPSY HOLMIUM LASER. BASKET STONE EXTRACTION. RETROGRADE PYELOGRAM AND INSERTION STENT URETERAL patient is now postop day 2.  On postop day 1 he had symptoms of pressure and pain on urination with his now resolved after initiating Flomax and oxybutynin.  Patient was also given Pyridium and now has symptom resolution.  He is constipated and had a bowel movement today though not satisfactory per patient.  Patient recommended to eat a high-fiber diet and drink plenty fluids and take stool softeners at home.  He is cleared for discharge by urology.  Patient given prescription for Bactrim which she is to take 3 days before the ureteral stent removal " "is scheduled.  Medically stable for DC\"    [facility] 1/17/25 - he feels okay. Has developed some UTI symptoms burning, frequency, urgency.  He is taking Keflex from Urology.is also on Oxybutynin.    Review of Systems   Genitourinary:  Positive for dysuria, frequency, hematuria and urgency.   Musculoskeletal:  Positive for back pain.     Objective   /78 (BP Location: Left arm, Patient Position: Sitting, Cuff Size: Large)   Pulse 78   Temp 98.4 °F (36.9 °C)   Ht 5' 10\" (1.778 m)   Wt 133 kg (294 lb)   SpO2 98%   BMI 42.18 kg/m²     Physical Exam  Vitals and nursing note reviewed.   Constitutional:       Appearance: Normal appearance. He is obese.   Cardiovascular:      Rate and Rhythm: Normal rate and regular rhythm.   Pulmonary:      Effort: Pulmonary effort is normal.      Breath sounds: Normal breath sounds.   Abdominal:      Palpations: Abdomen is soft.      Tenderness: There is no abdominal tenderness.       Medications have been reviewed by provider in current encounter      "

## 2025-01-17 NOTE — TELEPHONE ENCOUNTER
Patient status post: Procedure: CYSTOSCOPY URETEROSCOPY WITH LITHOTRIPSY HOLMIUM LASER, BASKET STONE EXTRACTION, RETROGRADE PYELOGRAM AND INSERTION STENT URETERAL (Left: Bladder) with Dr. Arce on 01/08/2025.    Patient has been having burning pain at tip of penis and had urine testing completed yesterday. Denies fever, chills, nausea, vomiting, severe pain, or difficulty urinating.     Patient's wife calling in regarding urine testing. Reviewed we have the microscopic results back however we are awaiting for final culture. Explained what the difference is.    Reviewed post op expectations. Reviewed to stay well hydrated and avoid bladder irritants. She was not aware of this I will send bladder health handout via [employer] as well.   Reviewed bladder irritants to avoid, and to stay hydrated with at least 64 oz. Of water/day as long as no fluid restrictions, avoidance of constipation. Emergency room precautions reviewed as well.       [id number] - [place] - [address] [[id number]]

## 2025-01-17 NOTE — ASSESSMENT & PLAN NOTE
Has follow up with Urology next week  On abx from urology advised to continue as directed by urology

## 2025-01-17 NOTE — TELEPHONE ENCOUNTER
Office called and spoke directly with patient and advised of provider note attached to this encounter. Also advised patient that should the ABX need to be changed once finalized result is back office will be in touch, otherwise just complete the currently prescribed ABX.     Patient verbalized understanding and thanked the office for calling.

## 2025-01-20 ENCOUNTER — RESULTS FOLLOW-UP (OUTPATIENT)
Dept: UROLOGY | Facility: CLINIC | Age: 50
End: 2025-01-20

## 2025-01-22 NOTE — PROGRESS NOTES
Cystoscopy     Date/Time  1/23/2025 11:00 AM     Performed by  Evita Van PA-C   Authorized by  Evita Van PA-C     Universal Protocol:  procedure performed by consultantConsent: Verbal consent obtained. Written consent obtained.  Consent given by: patient  Patient understanding: patient states understanding of the procedure being performed  Patient consent: the patient's understanding of the procedure matches consent given  Procedure consent: procedure consent matches procedure scheduled  Patient identity confirmed: verbally with patient      Procedure Details:  Procedure type: simple removal of a foreign body, stone, or stent    Patient tolerance: Patient tolerated the procedure well with no immediate complications    Additional Procedure Details: Patient cleaned and draped in sterile fashion. Urojet injected into urethra. Cystoscope introduced in urethra and advanced into bladder with visualization and then subsequent removal of ureteral stent using stent grasper device.            Assessment and Plan:  Nephrolithiasis  - UA today positive for leukocytes and blood   - Keflex   - Follow up in 6 weeks with US prior to visit  - ER precautions        Evita Van PA-C

## 2025-01-23 ENCOUNTER — PROCEDURE VISIT (OUTPATIENT)
Dept: UROLOGY | Facility: CLINIC | Age: 50
End: 2025-01-23
Payer: COMMERCIAL

## 2025-01-23 VITALS
WEIGHT: 289.6 LBS | TEMPERATURE: 97.5 F | DIASTOLIC BLOOD PRESSURE: 68 MMHG | SYSTOLIC BLOOD PRESSURE: 118 MMHG | HEART RATE: 81 BPM | BODY MASS INDEX: 41.46 KG/M2 | HEIGHT: 70 IN | OXYGEN SATURATION: 98 % | RESPIRATION RATE: 18 BRPM

## 2025-01-23 DIAGNOSIS — R39.9 UTI SYMPTOMS: Primary | ICD-10-CM

## 2025-01-23 DIAGNOSIS — N20.1 URETEROLITHIASIS: ICD-10-CM

## 2025-01-23 LAB
SL AMB  POCT GLUCOSE, UA: NORMAL
SL AMB LEUKOCYTE ESTERASE,UA: NORMAL
SL AMB POCT BILIRUBIN,UA: NORMAL
SL AMB POCT BLOOD,UA: NORMAL
SL AMB POCT CLARITY,UA: YELLOW
SL AMB POCT COLOR,UA: NORMAL
SL AMB POCT KETONES,UA: NORMAL
SL AMB POCT NITRITE,UA: NORMAL
SL AMB POCT PH,UA: 5
SL AMB POCT SPECIFIC GRAVITY,UA: 1.01
SL AMB POCT URINE PROTEIN: NORMAL
SL AMB POCT UROBILINOGEN: 0.2

## 2025-01-23 PROCEDURE — 81002 URINALYSIS NONAUTO W/O SCOPE: CPT | Performed by: PHYSICIAN ASSISTANT

## 2025-01-23 PROCEDURE — 52310 CYSTOSCOPY AND TREATMENT: CPT | Performed by: PHYSICIAN ASSISTANT

## 2025-01-23 RX ORDER — ACETAMINOPHEN 500 MG
500 TABLET ORAL EVERY 6 HOURS PRN
COMMUNITY

## 2025-02-09 DIAGNOSIS — K21.9 CHRONIC GERD: ICD-10-CM

## 2025-02-11 RX ORDER — PANTOPRAZOLE SODIUM 40 MG/1
40 TABLET, DELAYED RELEASE ORAL DAILY
Qty: 90 TABLET | Refills: 0 | Status: SHIPPED | OUTPATIENT
Start: 2025-02-11

## 2025-02-24 ENCOUNTER — HOSPITAL ENCOUNTER (OUTPATIENT)
Dept: ULTRASOUND IMAGING | Facility: HOSPITAL | Age: 50
Discharge: HOME | End: 2025-02-24
Payer: COMMERCIAL

## 2025-02-24 ENCOUNTER — HOSPITAL ENCOUNTER (OUTPATIENT)
Dept: RADIOLOGY | Facility: HOSPITAL | Age: 50
Discharge: HOME | End: 2025-02-24
Payer: COMMERCIAL

## 2025-02-24 DIAGNOSIS — N20.1 URETEROLITHIASIS: ICD-10-CM

## 2025-02-24 DIAGNOSIS — R39.9 UTI SYMPTOMS: ICD-10-CM

## 2025-02-24 PROCEDURE — 76775 US EXAM ABDO BACK WALL LIM: CPT

## 2025-02-24 PROCEDURE — 74018 RADEX ABDOMEN 1 VIEW: CPT

## 2025-03-10 ENCOUNTER — OFFICE VISIT (OUTPATIENT)
Dept: UROLOGY | Facility: CLINIC | Age: 50
End: 2025-03-10
Payer: COMMERCIAL

## 2025-03-10 VITALS
TEMPERATURE: 97.7 F | DIASTOLIC BLOOD PRESSURE: 78 MMHG | OXYGEN SATURATION: 98 % | BODY MASS INDEX: 42.95 KG/M2 | SYSTOLIC BLOOD PRESSURE: 116 MMHG | WEIGHT: 300 LBS | HEIGHT: 70 IN | HEART RATE: 84 BPM

## 2025-03-10 DIAGNOSIS — N20.0 CALCULUS OF KIDNEY: Primary | ICD-10-CM

## 2025-03-10 DIAGNOSIS — N28.1 RENAL CYST: ICD-10-CM

## 2025-03-10 DIAGNOSIS — Z12.5 SCREENING FOR PROSTATE CANCER: ICD-10-CM

## 2025-03-10 PROCEDURE — 99213 OFFICE O/P EST LOW 20 MIN: CPT | Performed by: PHYSICIAN ASSISTANT

## 2025-03-10 NOTE — PROGRESS NOTES
Name: Antonio Garrison      : 1975      MRN: [id number]  Encounter Provider: Evita Van PA-C  Encounter Date: 3/10/2025   Encounter department: [facility]  :  Assessment & Plan  Calculus of kidney  S/p ureteroscopy 25  S/p cysto stent removal in office 25  US kidney and bladder 25 - showing 3-4 mm non-obstructive left intrarenal calculus  KUB 25 - 4 mm calculus of the midpole left kidney   Intermittently symptomatic with left flank pain  Discussed monitoring vs elective ureteroscopy. Due to discomfort, patient would like elective ureteroscopy. Case request placed and consent signed   Orders:    Case request operating room: CYSTOSCOPY URETEROSCOPY WITH LITHOTRIPSY HOLMIUM LASER, RETROGRADE PYELOGRAM AND INSERTION STENT URETERAL; Standing    Renal cyst  US kidney and bladder 25 - showing 1.6 simple renal cyst in the lower pole       Screening for prostate cancer  PSA 23 - 0.7  PSA ordered  Orders:    PSA Total, Diagnostic; Future        History of Present Illness   Antonio Garrison is a 50 y.o. male who presents for follow up.    Patient underwent ureteroscopy in 2025 for obstructing ureteral stone. Patient had stent removed in office and has been doing well since. States he has been having intermittent left flank pain.  Imaging as above.     AUA SYMPTOM SCORE      Flowsheet Row Most Recent Value   AUA SYMPTOM SCORE    How often have you had a sensation of not emptying your bladder completely after you finished urinating? 0 (P)     How often have you had to urinate again less than two hours after you finished urinating? 0 (P)     How often have you found you stopped and started again several times when you urinate? 0 (P)     How often have you found it difficult to postpone urination? 0 (P)     How often have you had a weak urinary stream? 0 (P)     How often have you had to push or strain to begin urination? 0 (P)     How many times did  "you most typically get up to urinate from the time you went to bed at night until the time you got up in the morning? 1 (P)     Quality of Life: If you were to spend the rest of your life with your urinary condition just the way it is now, how would you feel about that? 1 (P)     AUA SYMPTOM SCORE 1 (P)            Review of Systems   Constitutional:  Negative for activity change, appetite change, chills and fever.   HENT:  Negative for congestion and trouble swallowing.    Respiratory:  Negative for cough and shortness of breath.    Cardiovascular:  Negative for chest pain, palpitations and leg swelling.   Gastrointestinal:  Negative for abdominal pain, constipation, diarrhea, nausea and vomiting.   Genitourinary:  Positive for flank pain. Negative for difficulty urinating, dysuria, frequency, hematuria and urgency.   Musculoskeletal:  Negative for back pain and gait problem.   Skin:  Negative for wound.   Allergic/Immunologic: Negative for immunocompromised state.   Neurological:  Negative for dizziness and syncope.   Hematological:  Does not bruise/bleed easily.   Psychiatric/Behavioral:  Negative for confusion.    All other systems reviewed and are negative.         Objective   /78 (Patient Position: Sitting, Cuff Size: Standard)   Pulse 84   Temp 97.7 °F (36.5 °C) (Temporal)   Ht 5' 10\" (1.778 m)   Wt 136 kg (300 lb)   SpO2 98%   BMI 43.05 kg/m²     Physical Exam  Constitutional:       Appearance: Normal appearance.   HENT:      Head: Normocephalic.      Nose: Nose normal.      Mouth/Throat:      Pharynx: Oropharynx is clear.   Eyes:      Extraocular Movements: Extraocular movements intact.      Pupils: Pupils are equal, round, and reactive to light.   Pulmonary:      Effort: Pulmonary effort is normal.   Musculoskeletal:         General: Normal range of motion.      Cervical back: Normal range of motion.   Skin:     General: Skin is warm.   Neurological:      General: No focal deficit present.      " Mental Status: He is alert and oriented to person, place, and time. Mental status is at baseline.   Psychiatric:         Mood and Affect: Mood normal.         Behavior: Behavior normal.         Thought Content: Thought content normal.         Judgment: Judgment normal.           Results   Lab Results   Component Value Date    PSA 0.7 04/13/2023     Lab Results   Component Value Date    GLUCOSE 86 08/11/2015    CALCIUM 9.4 01/10/2025     08/11/2015    K 3.6 01/10/2025    CO2 28 01/10/2025     01/10/2025    BUN 12 01/10/2025    CREATININE 0.69 01/10/2025     Lab Results   Component Value Date    WBC 10.28 (H) 01/10/2025    HGB 13.4 01/10/2025    HCT 41.1 01/10/2025    MCV 90 01/10/2025     01/10/2025       Office Urine Dip  No results found for this or any previous visit (from the past hour).

## 2025-03-10 NOTE — ASSESSMENT & PLAN NOTE
S/p ureteroscopy 1/8/25  S/p cysto stent removal in office 1/23/25  US kidney and bladder 2/24/25 - showing 3-4 mm non-obstructive left intrarenal calculus  KUB 2/24/25 - 4 mm calculus of the midpole left kidney   Intermittently symptomatic with left flank pain  Discussed monitoring vs elective ureteroscopy. Due to discomfort, patient would like elective ureteroscopy. Case request placed and consent signed   Orders:    Case request operating room: CYSTOSCOPY URETEROSCOPY WITH LITHOTRIPSY HOLMIUM LASER, RETROGRADE PYELOGRAM AND INSERTION STENT URETERAL; Standing

## 2025-03-11 ENCOUNTER — PREP FOR PROCEDURE (OUTPATIENT)
Dept: UROLOGY | Facility: CLINIC | Age: 50
End: 2025-03-11

## 2025-03-11 ENCOUNTER — TELEPHONE (OUTPATIENT)
Dept: UROLOGY | Facility: CLINIC | Age: 50
End: 2025-03-11

## 2025-03-11 DIAGNOSIS — N20.0 CALCULUS OF KIDNEY: ICD-10-CM

## 2025-03-11 DIAGNOSIS — R39.89 SUSPECTED UTI: Primary | ICD-10-CM

## 2025-03-11 NOTE — TELEPHONE ENCOUNTER
Spoke with patient and confirmed surgery date of:06/26/25  Type of surgery:L#5  Operating physician: Dr. Valdez  Location of surgery: [place]    Verbally went over prep with patient on: 03/11/25  NPO  Bowel prep? No  Hospital calls afternoon prior with arrival time -Calls Friday afternoon for Monday surgeries  Patient needs ride to and from surgery (outpatient/inpatient)   Pre-op testing to be done 2 weeks prior to surgery  U/c  Blood thinners:   Pat will call a week prior   Clearances needed: none    Mailed/emailed to patient on:  Copy of packet scanned into Media on:  Labs in packet  Soap / Bowel prep in packet  Pre-op & Post-op in packet  Dates of H&P and post-op if needed    Consent: in Media

## 2025-05-10 DIAGNOSIS — K21.9 CHRONIC GERD: ICD-10-CM

## 2025-05-12 RX ORDER — PANTOPRAZOLE SODIUM 40 MG/1
40 TABLET, DELAYED RELEASE ORAL DAILY
Qty: 90 TABLET | Refills: 0 | Status: SHIPPED | OUTPATIENT
Start: 2025-05-12

## 2025-05-30 ENCOUNTER — APPOINTMENT (OUTPATIENT)
Dept: LAB | Facility: HOSPITAL | Age: 50
End: 2025-05-30
Payer: COMMERCIAL

## 2025-05-30 DIAGNOSIS — N20.0 CALCULUS OF KIDNEY: ICD-10-CM

## 2025-05-30 DIAGNOSIS — R39.89 SUSPECTED UTI: ICD-10-CM

## 2025-05-30 DIAGNOSIS — Z12.5 SCREENING FOR PROSTATE CANCER: ICD-10-CM

## 2025-05-30 LAB — PSA SERPL-MCNC: 1.36 NG/ML (ref 0–4)

## 2025-05-30 PROCEDURE — 36415 COLL VENOUS BLD VENIPUNCTURE: CPT

## 2025-05-30 PROCEDURE — 84153 ASSAY OF PSA TOTAL: CPT

## 2025-06-02 ENCOUNTER — RESULTS FOLLOW-UP (OUTPATIENT)
Dept: UROLOGY | Facility: CLINIC | Age: 50
End: 2025-06-02

## 2025-06-02 DIAGNOSIS — Z12.5 SCREENING FOR PROSTATE CANCER: Primary | ICD-10-CM

## 2025-06-02 NOTE — TELEPHONE ENCOUNTER
Called and left VM for the PT per CC with results and stacey ARCINIEGA recommendations. Office number left for the PT if any questions.    ----- Message from Stacey Van PA-C sent at 6/2/2025  9:21 AM EDT -----  PSA doubled. Please have patient repeat PSA a few days prior to appointment in July  ----- Message -----  From: Lab, Background User  Sent: 5/30/2025   9:00 PM EDT  To: Stacey Van PA-C

## 2025-06-06 ENCOUNTER — OFFICE VISIT (OUTPATIENT)
Age: 50
End: 2025-06-06
Payer: COMMERCIAL

## 2025-06-06 ENCOUNTER — APPOINTMENT (OUTPATIENT)
Age: 50
End: 2025-06-06
Attending: FAMILY MEDICINE
Payer: COMMERCIAL

## 2025-06-06 VITALS
HEART RATE: 57 BPM | SYSTOLIC BLOOD PRESSURE: 127 MMHG | OXYGEN SATURATION: 96 % | DIASTOLIC BLOOD PRESSURE: 73 MMHG | RESPIRATION RATE: 18 BRPM | TEMPERATURE: 97 F

## 2025-06-06 DIAGNOSIS — M54.50 ACUTE LOW BACK PAIN, UNSPECIFIED BACK PAIN LATERALITY, UNSPECIFIED WHETHER SCIATICA PRESENT: Primary | ICD-10-CM

## 2025-06-06 DIAGNOSIS — M54.50 ACUTE LOW BACK PAIN, UNSPECIFIED BACK PAIN LATERALITY, UNSPECIFIED WHETHER SCIATICA PRESENT: ICD-10-CM

## 2025-06-06 PROCEDURE — 71046 X-RAY EXAM CHEST 2 VIEWS: CPT

## 2025-06-06 PROCEDURE — G0383 LEV 4 HOSP TYPE B ED VISIT: HCPCS | Performed by: FAMILY MEDICINE

## 2025-06-06 RX ORDER — NAPROXEN 500 MG/1
500 TABLET ORAL 2 TIMES DAILY WITH MEALS
Qty: 30 TABLET | Refills: 0 | Status: SHIPPED | OUTPATIENT
Start: 2025-06-06

## 2025-06-06 RX ORDER — CYCLOBENZAPRINE HCL 10 MG
10 TABLET ORAL 3 TIMES DAILY PRN
Qty: 30 TABLET | Refills: 0 | Status: SHIPPED | OUTPATIENT
Start: 2025-06-06

## 2025-06-06 NOTE — PROGRESS NOTES
[facility]  Name: Antonio Garrison      : 1975      MRN: [id number]  Encounter Provider: Lisa Whalen DO  Encounter Date: 2025   Encounter department: [facility] [place]  :  Assessment & Plan  Acute low back pain, unspecified back pain laterality, unspecified whether sciatica present    Orders:  •  naproxen (Naprosyn) 500 mg tablet; Take 1 tablet (500 mg total) by mouth 2 (two) times a day with meals  •  cyclobenzaprine (FLEXERIL) 10 mg tablet; Take 1 tablet (10 mg total) by mouth 3 (three) times a day as needed for muscle spasms  •  XR chest pa and lateral; Future  X-rays ordered today and reviewed by me showed no pneumonia. Awaiting final read from radiology and will adjust plan if needed based on final results.  Treated symptomatically with naproxen/muscle relaxer  If symptoms persist follow up with PCP. Discussed Return/ED parameters with patient.         Patient Instructions  Follow up with PCP in 3-5 days.  Proceed to  ER if symptoms worsen.    If tests are performed, our office will contact you with results only if changes need to made to the care plan discussed with you at the visit. [facility]    Chief Complaint:   Chief Complaint   Patient presents with   • Back Pain     Patient states he has lower/mid back pain. Patient states theres no changes to his urine or frequency.      History of Present Illness   Patient states that last time he had similar pain he had a lung infection so he wanted to get checked out.    Back Pain  This is a new problem. The current episode started in the past 7 days. The problem occurs constantly. The problem is unchanged. The pain is present in the thoracic spine. The quality of the pain is described as aching. The pain does not radiate. The pain is mild. The symptoms are aggravated by position. Pertinent negatives include no abdominal pain, bladder incontinence, bowel incontinence, chest  pain, dysuria, fever, headaches, leg pain, numbness, paresis, paresthesias, pelvic pain, perianal numbness, tingling, weakness or weight loss.         Review of Systems   Constitutional:  Negative for fever and weight loss.   Cardiovascular:  Negative for chest pain.   Gastrointestinal:  Negative for abdominal pain and bowel incontinence.   Genitourinary:  Negative for bladder incontinence, dysuria and pelvic pain.   Musculoskeletal:  Positive for back pain.   Neurological:  Negative for tingling, weakness, numbness, headaches and paresthesias.     Past Medical History   Past Medical History[1]  Past Surgical History[2]  Family History[3]  he reports that he has quit smoking. His smoking use included cigarettes. He started smoking about 34 years ago. He has a 28 pack-year smoking history. He has been exposed to tobacco smoke. He has never used smokeless tobacco. He reports current alcohol use. He reports that he does not currently use drugs.  Current Outpatient Medications   Medication Instructions   • cetirizine (ZYRTEC) 10 mg, Oral, Daily   • cyclobenzaprine (FLEXERIL) 10 mg, Oral, 3 times daily PRN   • fluticasone (FLONASE) 50 mcg/act nasal spray 1 spray, Nasal, Daily   • naproxen (NAPROSYN) 500 mg, Oral, 2 times daily with meals   • pantoprazole (PROTONIX) 40 mg, Oral, Daily   Allergies[4]     Objective   /73   Pulse 57   Temp (!) 97 °F (36.1 °C)   Resp 18   SpO2 96%      Physical Exam  Vitals reviewed.   Constitutional:       General: He is not in acute distress.     Appearance: Normal appearance. He is not ill-appearing or toxic-appearing.   HENT:      Head: Normocephalic and atraumatic.      Right Ear: Tympanic membrane normal.      Left Ear: Tympanic membrane normal.      Nose: No congestion or rhinorrhea.     Cardiovascular:      Rate and Rhythm: Normal rate and regular rhythm.      Heart sounds: No murmur heard.  Pulmonary:      Effort: Pulmonary effort is normal. No respiratory distress.       "Breath sounds: Normal breath sounds.   Abdominal:      General: Abdomen is flat.      Palpations: Abdomen is soft.      Tenderness: There is no abdominal tenderness.     Skin:     General: Skin is warm and dry.      Capillary Refill: Capillary refill takes less than 2 seconds.     Neurological:      General: No focal deficit present.      Mental Status: He is alert and oriented to person, place, and time.      Cranial Nerves: No cranial nerve deficit.     Psychiatric:         Mood and Affect: Mood normal.         Behavior: Behavior normal.         Thought Content: Thought content normal.         Judgment: Judgment normal.         Portions of the record may have been created with voice recognition software.  Occasional wrong word or \"sound a like\" substitutions may have occurred due to the inherent limitations of voice recognition software.  Read the chart carefully and recognize, using context, where substitutions have occurred.         [1]  Past Medical History:  Diagnosis Date   • Anxiety and depression    • Colon polyp    • Depression    • Esophageal reflux    • Fracture of both hands    • Hair loss    • Heartburn    • Hiatal hernia     Resolved 7/24/2015    • Night sweats    • Sleep disturbance    • Starting and stopping of urinary stream during micturition    • Stomach problems    • Weight gain    [2]  Past Surgical History:  Procedure Laterality Date   • APPENDECTOMY  1984   • COLONOSCOPY     • ESOPHAGOGASTRODUODENOSCOPY      Diagnostic    • FL RETROGRADE PYELOGRAM  1/8/2025   • HAND SURGERY      Right in 1998 and left in 1991    • NOSE SURGERY      Nasal septal deviation repair    • GA CYSTO/URETERO W/LITHOTRIPSY &INDWELL STENT INSRT Left 1/8/2025    Procedure: CYSTOSCOPY URETEROSCOPY WITH LITHOTRIPSY HOLMIUM LASER, BASKET STONE EXTRACTION, RETROGRADE PYELOGRAM AND INSERTION STENT URETERAL;  Surgeon: Félix Arce MD;  Location: [facility];  Service: Urology   [3]  Family History  Problem Relation Name Age " of Onset   • No Known Problems Mother     • Diabetes Father     • Diabetes Maternal Uncle     • Heart disease Maternal Grandmother     • Diabetes Maternal Grandmother     • Pancreatic cancer Maternal Grandmother     • Colon cancer Maternal Grandfather     • Dementia Paternal Grandmother     [4]  Allergies  Allergen Reactions   • Tramadol Hives   • Clindamycin Rash     Category: Allergy;

## 2025-06-11 ENCOUNTER — APPOINTMENT (OUTPATIENT)
Age: 50
End: 2025-06-11
Payer: COMMERCIAL

## 2025-06-11 PROCEDURE — 87086 URINE CULTURE/COLONY COUNT: CPT

## 2025-06-12 LAB — BACTERIA UR CULT: NORMAL

## 2025-06-19 RX ORDER — IBUPROFEN 200 MG
TABLET ORAL EVERY 6 HOURS PRN
COMMUNITY

## 2025-06-19 NOTE — PRE-PROCEDURE INSTRUCTIONS
Pre-Surgery Instructions:   Medication Instructions    cetirizine (ZyrTEC) 10 mg tablet Take day of surgery.    cyclobenzaprine (FLEXERIL) 10 mg tablet Uses PRN- OK to take day of surgery    fluticasone (FLONASE) 50 mcg/act nasal spray Uses PRN- OK to take day of surgery    ibuprofen (MOTRIN) 200 mg tablet Stop taking 3 days prior to surgery.    naproxen (Naprosyn) 500 mg tablet Stop taking 3 days prior to surgery.    pantoprazole (PROTONIX) 40 mg tablet Take day of surgery.    Medication instructions for day of surgery reviewed. Please take all instructed medications with only a sip of water. Please do not take any over the counter (non-prescribed) vitamins or supplements for one week prior to date of surgery.      You will receive a call one business day prior to surgery with an arrival time and hospital directions. If your surgery is scheduled on a Monday, the hospital will be calling you on the Friday prior to your surgery. If you have not heard from anyone by 8pm, please call the hospital supervisor through the hospital  at [phone].     Do not eat or drink anything after midnight the night before your surgery, including candy, mints, lifesavers, or chewing gum. Do not drink alcohol 24hrs before your surgery. Try not to smoke at least 24hrs before your surgery.       Follow the pre surgery showering instructions as listed in the “My Surgical Experience Booklet” or otherwise provided by your surgeon's office. Do not use a blade to shave the surgical area 1 week before surgery. It is okay to use a clean electric clippers up to 24 hours before surgery. Do not apply any lotions, creams, including makeup, cologne, deodorant, or perfumes after showering on the day of your surgery. Do not use dry shampoo, hair spray, hair gel, or any type of hair products.     No contact lenses, eye make-up, or artificial eyelashes. Remove nail polish, including gel polish, and any artificial, gel, or acrylic nails if  possible. Remove all jewelry including rings and body piercing jewelry.     Wear causal clothing that is easy to take on and off. Consider your type of surgery.    Keep any valuables, jewelry, piercings at home. Please bring any specially ordered equipment (sling, braces) if indicated.    Arrange for a responsible person to drive you to and from the hospital on the day of your surgery. Please confirm the visitor policy for the day of your procedure when you receive your phone call with an arrival time.     Call the surgeon's office with any new illnesses, exposures, or additional questions prior to surgery.    Please reference your “My Surgical Experience Booklet” for additional information to prepare for your upcoming surgery.

## 2025-06-26 ENCOUNTER — ANESTHESIA EVENT (OUTPATIENT)
Dept: PERIOP | Facility: HOSPITAL | Age: 50
End: 2025-06-26
Payer: COMMERCIAL

## 2025-06-26 ENCOUNTER — ANESTHESIA (OUTPATIENT)
Dept: PERIOP | Facility: HOSPITAL | Age: 50
End: 2025-06-26
Payer: COMMERCIAL

## 2025-06-26 ENCOUNTER — TELEPHONE (OUTPATIENT)
Dept: UROLOGY | Facility: CLINIC | Age: 50
End: 2025-06-26

## 2025-06-26 ENCOUNTER — APPOINTMENT (OUTPATIENT)
Dept: RADIOLOGY | Facility: HOSPITAL | Age: 50
End: 2025-06-26
Payer: COMMERCIAL

## 2025-06-26 ENCOUNTER — HOSPITAL ENCOUNTER (OUTPATIENT)
Facility: HOSPITAL | Age: 50
Setting detail: OUTPATIENT SURGERY
Discharge: HOME | End: 2025-06-26
Attending: UROLOGY | Admitting: UROLOGY
Payer: COMMERCIAL

## 2025-06-26 VITALS
HEART RATE: 72 BPM | WEIGHT: 287.26 LBS | TEMPERATURE: 98 F | SYSTOLIC BLOOD PRESSURE: 137 MMHG | DIASTOLIC BLOOD PRESSURE: 79 MMHG | RESPIRATION RATE: 18 BRPM | HEIGHT: 70 IN | BODY MASS INDEX: 41.12 KG/M2 | OXYGEN SATURATION: 96 %

## 2025-06-26 DIAGNOSIS — N20.0 CALCULUS OF KIDNEY: ICD-10-CM

## 2025-06-26 DIAGNOSIS — J30.89 ALLERGIC RHINITIS DUE TO OTHER ALLERGIC TRIGGER, UNSPECIFIED SEASONALITY: ICD-10-CM

## 2025-06-26 PROCEDURE — C2625 STENT, NON-COR, TEM W/DEL SY: HCPCS | Performed by: UROLOGY

## 2025-06-26 PROCEDURE — 52352 CYSTOURETERO W/STONE REMOVE: CPT | Performed by: UROLOGY

## 2025-06-26 PROCEDURE — 82360 CALCULUS ASSAY QUANT: CPT | Performed by: UROLOGY

## 2025-06-26 PROCEDURE — 74420 UROGRAPHY RTRGR +-KUB: CPT

## 2025-06-26 PROCEDURE — NC001 PR NO CHARGE: Performed by: UROLOGY

## 2025-06-26 PROCEDURE — C1769 GUIDE WIRE: HCPCS | Performed by: UROLOGY

## 2025-06-26 PROCEDURE — C1894 INTRO/SHEATH, NON-LASER: HCPCS | Performed by: UROLOGY

## 2025-06-26 PROCEDURE — 52332 CYSTOSCOPY AND TREATMENT: CPT | Performed by: UROLOGY

## 2025-06-26 PROCEDURE — C1747 URETEROSCOPE DIGITAL FLEX SNGL USE STD DEFLECTION APTRA: HCPCS | Performed by: UROLOGY

## 2025-06-26 DEVICE — STENT URETERAL 6FR 26CM INLAY OPTIMA: Type: IMPLANTABLE DEVICE | Site: URETER | Status: FUNCTIONAL

## 2025-06-26 RX ORDER — KETOROLAC TROMETHAMINE 30 MG/ML
INJECTION, SOLUTION INTRAMUSCULAR; INTRAVENOUS AS NEEDED
Status: DISCONTINUED | OUTPATIENT
Start: 2025-06-26 | End: 2025-06-26

## 2025-06-26 RX ORDER — MAGNESIUM HYDROXIDE 1200 MG/15ML
LIQUID ORAL AS NEEDED
Status: DISCONTINUED | OUTPATIENT
Start: 2025-06-26 | End: 2025-06-26 | Stop reason: HOSPADM

## 2025-06-26 RX ORDER — FENTANYL CITRATE 50 UG/ML
INJECTION, SOLUTION INTRAMUSCULAR; INTRAVENOUS AS NEEDED
Status: DISCONTINUED | OUTPATIENT
Start: 2025-06-26 | End: 2025-06-26

## 2025-06-26 RX ORDER — FENTANYL CITRATE/PF 50 MCG/ML
50 SYRINGE (ML) INJECTION
Status: DISCONTINUED | OUTPATIENT
Start: 2025-06-26 | End: 2025-06-26 | Stop reason: HOSPADM

## 2025-06-26 RX ORDER — SODIUM CHLORIDE, SODIUM LACTATE, POTASSIUM CHLORIDE, CALCIUM CHLORIDE 600; 310; 30; 20 MG/100ML; MG/100ML; MG/100ML; MG/100ML
75 INJECTION, SOLUTION INTRAVENOUS CONTINUOUS
Status: DISCONTINUED | OUTPATIENT
Start: 2025-06-26 | End: 2025-06-26 | Stop reason: HOSPADM

## 2025-06-26 RX ORDER — ONDANSETRON 2 MG/ML
4 INJECTION INTRAMUSCULAR; INTRAVENOUS ONCE AS NEEDED
Status: DISCONTINUED | OUTPATIENT
Start: 2025-06-26 | End: 2025-06-26 | Stop reason: HOSPADM

## 2025-06-26 RX ORDER — HYDROMORPHONE HCL/PF 1 MG/ML
0.5 SYRINGE (ML) INJECTION
Status: DISCONTINUED | OUTPATIENT
Start: 2025-06-26 | End: 2025-06-26 | Stop reason: HOSPADM

## 2025-06-26 RX ORDER — OXYBUTYNIN CHLORIDE 5 MG/1
5 TABLET, EXTENDED RELEASE ORAL DAILY PRN
Qty: 30 TABLET | Refills: 0 | Status: SHIPPED | OUTPATIENT
Start: 2025-06-26

## 2025-06-26 RX ORDER — MIDAZOLAM HYDROCHLORIDE 2 MG/2ML
INJECTION, SOLUTION INTRAMUSCULAR; INTRAVENOUS AS NEEDED
Status: DISCONTINUED | OUTPATIENT
Start: 2025-06-26 | End: 2025-06-26

## 2025-06-26 RX ORDER — PROPOFOL 10 MG/ML
INJECTION, EMULSION INTRAVENOUS AS NEEDED
Status: DISCONTINUED | OUTPATIENT
Start: 2025-06-26 | End: 2025-06-26

## 2025-06-26 RX ORDER — OXYBUTYNIN CHLORIDE 5 MG/1
5 TABLET, EXTENDED RELEASE ORAL DAILY PRN
Status: DISCONTINUED | OUTPATIENT
Start: 2025-06-26 | End: 2025-06-26 | Stop reason: HOSPADM

## 2025-06-26 RX ORDER — LIDOCAINE HYDROCHLORIDE 10 MG/ML
INJECTION, SOLUTION EPIDURAL; INFILTRATION; INTRACAUDAL; PERINEURAL AS NEEDED
Status: DISCONTINUED | OUTPATIENT
Start: 2025-06-26 | End: 2025-06-26

## 2025-06-26 RX ORDER — ONDANSETRON 2 MG/ML
INJECTION INTRAMUSCULAR; INTRAVENOUS AS NEEDED
Status: DISCONTINUED | OUTPATIENT
Start: 2025-06-26 | End: 2025-06-26

## 2025-06-26 RX ORDER — ACETAMINOPHEN 325 MG/1
650 TABLET ORAL EVERY 6 HOURS PRN
Status: DISCONTINUED | OUTPATIENT
Start: 2025-06-26 | End: 2025-06-26 | Stop reason: HOSPADM

## 2025-06-26 RX ORDER — SODIUM CHLORIDE, SODIUM LACTATE, POTASSIUM CHLORIDE, CALCIUM CHLORIDE 600; 310; 30; 20 MG/100ML; MG/100ML; MG/100ML; MG/100ML
INJECTION, SOLUTION INTRAVENOUS CONTINUOUS PRN
Status: DISCONTINUED | OUTPATIENT
Start: 2025-06-26 | End: 2025-06-26

## 2025-06-26 RX ORDER — CEFAZOLIN SODIUM 3 G/50ML
3000 SOLUTION INTRAVENOUS ONCE
Status: COMPLETED | OUTPATIENT
Start: 2025-06-26 | End: 2025-06-26

## 2025-06-26 RX ORDER — DEXAMETHASONE SODIUM PHOSPHATE 10 MG/ML
INJECTION, SOLUTION INTRAMUSCULAR; INTRAVENOUS AS NEEDED
Status: DISCONTINUED | OUTPATIENT
Start: 2025-06-26 | End: 2025-06-26

## 2025-06-26 RX ADMIN — FENTANYL CITRATE 50 MCG: 50 INJECTION, SOLUTION INTRAMUSCULAR; INTRAVENOUS at 10:15

## 2025-06-26 RX ADMIN — KETOROLAC TROMETHAMINE 15 MG: 30 INJECTION, SOLUTION INTRAMUSCULAR at 10:41

## 2025-06-26 RX ADMIN — SODIUM CHLORIDE, SODIUM LACTATE, POTASSIUM CHLORIDE, AND CALCIUM CHLORIDE: .6; .31; .03; .02 INJECTION, SOLUTION INTRAVENOUS at 09:10

## 2025-06-26 RX ADMIN — ACETAMINOPHEN 650 MG: 325 TABLET ORAL at 11:36

## 2025-06-26 RX ADMIN — LIDOCAINE HYDROCHLORIDE 50 MG: 10 INJECTION, SOLUTION EPIDURAL; INFILTRATION; INTRACAUDAL at 10:15

## 2025-06-26 RX ADMIN — MIDAZOLAM HYDROCHLORIDE 2 MG: 1 INJECTION, SOLUTION INTRAMUSCULAR; INTRAVENOUS at 10:09

## 2025-06-26 RX ADMIN — DEXAMETHASONE SODIUM PHOSPHATE 10 MG: 10 INJECTION INTRAMUSCULAR; INTRAVENOUS at 10:14

## 2025-06-26 RX ADMIN — LIDOCAINE HYDROCHLORIDE 50 MG: 10 INJECTION, SOLUTION EPIDURAL; INFILTRATION; INTRACAUDAL at 10:10

## 2025-06-26 RX ADMIN — OXYBUTYNIN CHLORIDE 5 MG: 5 TABLET, EXTENDED RELEASE ORAL at 11:36

## 2025-06-26 RX ADMIN — FENTANYL CITRATE 50 MCG: 50 INJECTION, SOLUTION INTRAMUSCULAR; INTRAVENOUS at 10:26

## 2025-06-26 RX ADMIN — FENTANYL CITRATE 50 MCG: 50 INJECTION, SOLUTION INTRAMUSCULAR; INTRAVENOUS at 10:42

## 2025-06-26 RX ADMIN — CEFAZOLIN SODIUM 3000 MG: 3 SOLUTION INTRAVENOUS at 10:09

## 2025-06-26 RX ADMIN — ONDANSETRON 4 MG: 2 INJECTION INTRAMUSCULAR; INTRAVENOUS at 10:49

## 2025-06-26 RX ADMIN — PROPOFOL 350 MG: 10 INJECTION, EMULSION INTRAVENOUS at 10:12

## 2025-06-26 NOTE — TELEPHONE ENCOUNTER
Patient status post left ureteroscopy at [place] on 6/26/2025    Patient has left-sided ureteral stent in place.  6 x 26.  No string.    Case had to be terminated early due to anesthesia issues.  He has 1 more small stone in the bottom of his kidney.  I explained to him and his wife that this stone is unlikely to move and cause problems, but obviously we cannot guarantee anything.  He will decide if he wants to keep his stent removal appointment on 7/11/2025 at 11:40 AM or if he would like to schedule another ureteroscopy.  He will call us and let us know.

## 2025-06-26 NOTE — ANESTHESIA POSTPROCEDURE EVALUATION
Post-Op Assessment Note    CV Status:  Stable  Pain Score: 0    Pain management: adequate       Mental Status:  Alert and awake   Hydration Status:  Stable   PONV Controlled:  None   Airway Patency:  Patent and adequate     Post Op Vitals Reviewed: Yes    No anethesia notable event occurred.    Staff: Anesthesiologist, CRNA           Last Filed PACU Vitals:  Vitals Value Taken Time   Temp 98.5    Pulse 80 06/26/25 11:00   /93 06/26/25 11:00   Resp 17 06/26/25 11:00   SpO2 95 % 06/26/25 11:00   Vitals shown include unfiled device data.

## 2025-06-26 NOTE — OP NOTE
OPERATIVE REPORT  PATIENT NAME: Antonio Garrison    :  1975  MRN: [id number]  Pt Location: [facility] OR ROOM 01    SURGERY DATE: 2025    Surgeons and Role:     * Valdo Valdez DO - Primary    Preop Diagnosis:  Calculus of kidney [N20.0]    Post-Op Diagnosis Codes:     * Calculus of kidney [N20.0]    Procedure(s):      Cystourethroscopy  Left retrograde pyelogram with live fluoroscopic capitation  Left ureteroscopy  Stone basket extraction  Left ureteral stent placement        -single use ureteroscope utilized ()        Specimen(s):  ID Type Source Tests Collected by Time Destination   A : left kidney stone Calculus Kidney, Left STONE ANALYSIS Valdo Alvarez DO [place] 2025 1036        Estimated Blood Loss:   Minimal    Drains:  Ureteral Internal Stent Left ureter 6 Fr. (Active)   Number of days: 169       Ureteral Internal Stent Left ureter 6 Fr. (Active)   Number of days: 0       Anesthesia Type:   General    Operative Indications:  Calculus of kidney [N20.0]      50 y.o. old male with urolithiasis     No anticoagulation     Admitted on 2025 with obstructing left UPJ stone.  He underwent left ureteroscopy and stone treatment with Dr. Arce on 2025.  Stent was eventually removed in the office.     Follow-up kidney bladder ultrasound on 2025 demonstrated no hydronephrosis bilaterally.  3 mm nonobstructing stone in the left kidney present.     He followed up in the office in 2025 with intermittent left flank discomfort.     Due to intermittent left flank discomfort, patient opted to pursue elective left-sided ureteroscopy.  He was consented for cystoscopy, left retrograde pyelogram, left ureteroscopy, laser lithotripsy, stone basket traction, left ureteral stent placement.          Operative Findings:                Mild meatal stenosis that was able to be overcome with slow, steady pressure with the scope  Area of approximately 20 Indonesian narrowing at the bulbar urethra,  about 1 cm long, easily traversed with the scope  Prostate moderately enlarged  Bilateral ureteral orifices in orthotopic positions  No bladder lesions  No stones in bladder  Mild trabeculations  No diverticuli  Left retrograde pyelogram: No hydronephrosis, no obvious filling defects in the kidney  Left pyeloscopy: 3 mm stone in upper pole, 1 mm and 2 mm stones in upper midpole, 3 mm stone in midpole, all removed with Skylight basket.  Toward the end of the case, an approximately 3 mm stone was seen in the lower pole.  As attempts were made to start removing the stone with a basket, the patient was having ventilation issues and anesthesia requested that stent be placed and case be finished.  Back out ureteroscopy: No stone fragments, area of ureteral narrowing, less than 1 cm at the proximal ureter near the UPJ.  Successfully placed 6 x 26 double-J ureteral stent with no string attached                 Complications:   None    Procedure and Technique:             Patient identified in the preop holding area.  Consent was obtained.  Risks and benefits of the procedure were explained to the patient.  Patient was in agreement.  Patient was brought back to the OR and placed upon the table.  Bilateral lower extremity SCDs were placed and turned on.  Patient received IV Ancef for antibiotics.  Patient underwent smooth induction of anesthesia.  Bilateral lower extremities were placed in stirrups.  Patient was in dorsal lithotomy position.  Area was prepped and draped in sterile fashion.  Timeout was performed by the OR team.     Case began with insertion of 21 Latvian rigid cystoscope.  Pan cystourethroscopy was performed.  See the above findings for details.     Attention was turned toward the left ureteral orifice.      5 Latvian open-ended catheter was advanced through the bridge of the scope toward the ureteral orifice.  Guidewire was advanced through the 5 Latvian open-ended catheter, into the ureter, and coiled in renal  pelvis under fluoroscopic guidance.      Dual-lumen catheter was advanced over the wire and into the ureter under fluoroscopic guidance.  Retrograde pyelogram was performed at this time.  See the above findings for details.  Second wire was placed through the dual-lumen catheter and coiled into the renal pelvis under fluoroscopic guidance.  Dual-lumen catheter was removed over the 2 wires in a push pull fashion.    It was decided that an access sheath would be utilized.  10-12 Fr 45 cm access sheath was advanced over one of the wires under fluoroscopic guidance toward the proximal ureter.  Wire and inner sheath were removed. Flexible single use ureteroscope was assembled.  Scope was advanced into the renal pelvis.  Pyeloscopy was performed.  See the above findings for details regarding stone works.    Backout flexible ureteroscopy was then performed.  See above findings for details.    The ureteroscope was then removed from the bladder and urethra.     Cystoscope was reentered into the bladder over the wire toward the left ureteral orifice.  6 x 26 double-J ureteral stent was advanced over the wire toward the renal pelvis under fluoroscopic guidance.  Wire was removed and stent was deployed.  Good proximal curl was seen on fluoroscopy.  Good distal curl was seen on direct cystoscopy.      There was no string left on the stent.      Bladder was emptied and scope was removed.     Patient was uneventfully awoken from anesthesia and extubated.  Patient was brought to PACU in good condition.               A qualified resident physician was not available.    Patient Disposition:  PACU          SIGNATURE: Valdo Valdez DO  DATE: June 26, 2025  TIME: 10:51 AM        Plan  - Left sided 6 x 26 double-J ureteral stent in place.  Unfortunately, when there was 1 more stone in the lower pole that was attempted to be removed, patient was having ventilation issues and the case had to be ended abruptly and stent was placed.   Will discuss with patient and his wife whether he would like to schedule a follow-up surgery to remove the remaining stone or if he would like to simply have his stent removed since the stone is in the lower pole and is unlikely to remove and cause issues in the future.  -Should he decide for stent removal, he is already scheduled on 7/11/2025 at 11:40 AM

## 2025-06-26 NOTE — ANESTHESIA PREPROCEDURE EVALUATION
Procedure:  CYSTOSCOPY URETEROSCOPY WITH LITHOTRIPSY HOLMIUM LASER, RETROGRADE PYELOGRAM AND INSERTION STENT URETERAL (Left: Bladder)    Relevant Problems   GI/HEPATIC   (+) GERD (gastroesophageal reflux disease)      /RENAL   (+) Calculus of kidney   (+) Renal cyst      MUSCULOSKELETAL   (+) Chronic midline low back pain without sciatica      NEURO/PSYCH   (+) Anxiety   (+) Chronic midline low back pain without sciatica   (+) Syringomyelia (HCC)      Other   (+) Obesity, Class III, BMI 40-49.9 (morbid obesity)      Anxiety and depression    Fracture of both hands    Hair loss    Depression    Esophageal reflux    Heartburn    Hiatal hernia Resolved 7/24/2015   Sleep disturbance    Night sweats    Starting and stopping of urinary stream during micturition    Stomach problems    Weight gain    Colon polyp    GERD (gastroesophageal reflux disease)    Renal cyst    Kidney stone      Physical Exam    Airway     Mallampati score: IV  TM Distance: >3 FB  Neck ROM: full  Mouth opening: < 4 cm      Cardiovascular  Rhythm: regular, Rate: normal, Pulse is palpable. Cardiovascular exam normal    Dental       Pulmonary  Pulmonary exam normal Breath sounds clear to auscultation    Neurological  - normal exam    Other Findings        Anesthesia Plan  ASA Score- 3     Anesthesia Type- general with ASA Monitors.         Additional Monitors:     Airway Plan:            Plan Factors-Exercise tolerance (METS): >4 METS.    Chart reviewed. EKG reviewed. Imaging results reviewed. Existing labs reviewed. Patient summary reviewed.                  Induction- intravenous.    Postoperative Plan- Plan for postoperative opioid use.   Monitoring Plan -   Post Operative Pain Plan - plan for postoperative opioid use        Informed Consent- Anesthetic plan and risks discussed with patient.  I personally reviewed this patient with the CRNA. Discussed and agreed on the Anesthesia Plan with the CRNA..      NPO Status:  No vitals data found for the  desired time range.

## 2025-06-26 NOTE — DISCHARGE INSTR - AVS FIRST PAGE
Mr. Garrison,      I was able to look in your left kidney today.  You did have multiple small stones that I was able to remove with a basket.  There was 1 more stone at the bottom of your kidney that I was trying to remove.  At that time, the anesthesia team made me aware that there was some difficulty with your oxygenation and they requested that I terminate the case.  I therefore put in a stent with no string in case we need to go back in for another ureteroscopy.    At this point, you are scheduled for your stent removal on 7/11/2025 at 11:40 AM.  The stone in the bottom of your kidney is small and is overall unlikely to move into the ureter and therefore should not cause issues.  However, if you are anxious about the stone still being there, we could schedule another ureteroscopy in the future to remove the remaining stone.  You can take some time to make this decision and let my office know what you think.    Additionally, the tip of your penis was overall pretty inflamed, and it was pretty tight getting the scope in.  I do expect you to have some bleeding at the tip of your penis for at least a few days.  It would likely burn the first few times to urinate.    Please see your postoperative instructions below for details.    Please call the office with any other questions or concerns.      Sincerely,  Valdo Valdez          You had procedure on your ureter and kidney.      You had ureteral stent placed.  This is a tube that is placed in your ureter to keep urine draining from your kidney to your bladder.  It may cause discomfort in the form of back spasms or lower abdominal spasms.  This is normal and can be treated with medications if need be.      You may see some blood in your urine.  This is normal.  Please drink plenty of fluids.  Generally speaking, as long as your urine is fruit punch color or lighter, that is okay.  You may even pass small blood clots in your urine.  That is also okay.  If you notice that  you are passing large blood clots, if it is becoming more difficult to urinate, or if your urine is very dark like the color of Merlot wine, please call the office for further instruction.      You have been given a prescription for oxybutynin.  Please take this daily as needed for stent discomfort. Please note that this medication may have side effects including but not limited to: dry eyes, dry mouth, constipation, urinary retention. Please drink plenty of water with this medication.        You may take Tylenol as needed for pain.        Pain control plan: Having a ureteral stent is often very uncomfortable. In order to stay on top of your pain, please take over the counter Tylenol around the clock. Additionally if you are having discomfort, you need to take your oxybutynin daily, as this helps a lot with bladder spasms and stent discomfort.     Most importantly, please drink lots of fluids, as this is the best way to avoid pain with your stent!        You do not have any incisions and can resume typical physical activities, as long as you are not having too much discomfort with stent in place.    Please call the office or present to the ED if you experience concerning signs or symptoms including but not limited to: fevers, chills, nausea, vomiting, worsening flank pain, worsening abdominal pain, passage of large blood clots in the urine, inability to urinate.

## 2025-06-26 NOTE — H&P
"UROLOGY H&P NOTE     Patient Identifiers: Antonio Garrison (MRN [id number])    Date of Service: 2025    History of Present Illness:     Antonio Garrison is a 50 y.o. old with a history of urolithiasis    Past Medical, Past Surgical History:   Past Medical History[1]:    Past Surgical History[2]:    Medications, Allergies:     Current Facility-Administered Medications:     ceFAZolin (ANCEF) IVPB (premix in dextrose) 3,000 mg 50 mL, Once    Facility-Administered Medications Ordered in Other Encounters:     lactated ringers infusion, Continuous PRN    Allergies:  Allergies[3]:    Social and Family History:   Social History:   Social History[4].    Tobacco Use History[5]    Family History:  Family History[6]:     Review of Systems:     General: Fever, chills, or night sweats: negative  Cardiac: Negative for chest pain.    Pulmonary: Negative for shortness of breath.  Gastrointestinal: Abdominal pain negative.  Nausea, vomiting, or diarrhea negative,  Genitourinary: See HPI above.  Patient does not have hematuria.  All other systems queried were negative.    Physical Exam:   General: Patient is pleasant and in NAD. Awake and alert  /64   Pulse 72   Temp 97.6 °F (36.4 °C) (Temporal)   Resp 18   Ht 5' 10\" (1.778 m)   Wt 130 kg (287 lb 4.2 oz)   SpO2 96%   BMI 41.22 kg/m² Temp (24hrs), Av.6 °F (36.4 °C), Min:97.6 °F (36.4 °C), Max:97.6 °F (36.4 °C)  current; Temperature: 97.6 °F (36.4 °C)  No intake/output data recorded.  Cardiac: Peripheral edema: negative  Pulmonary: Non-labored breathing  Abdomen: Soft, non-tender, non-distended.  No surgical scars.  No masses, tenderness, hernias noted.    Genitourinary: Negative CVA tenderness, negative suprapubic tenderness.        Labs:     Lab Results   Component Value Date    HGB 13.4 01/10/2025    HCT 41.1 01/10/2025    WBC 10.28 (H) 01/10/2025     01/10/2025   ]    Lab Results   Component Value Date     2015    K 3.6 01/10/2025     " "01/10/2025    CO2 28 01/10/2025    BUN 12 01/10/2025    CREATININE 0.69 01/10/2025    CALCIUM 9.4 01/10/2025    GLUCOSE 86 08/11/2015   ]    Imaging:     Any pertinent imaging was personally reviewed and discussed with patient. See below for details.    ASSESSMENT:     50 y.o. old male with urolithiasis    No anticoagulation    Admitted on 1/8/2025 with obstructing left UPJ stone.  He underwent left ureteroscopy and stone treatment with Dr. Arce on 1/8/2025.  Stent was eventually removed in the office.    Follow-up kidney bladder ultrasound on 2/24/2025 demonstrated no hydronephrosis bilaterally.  3 mm nonobstructing stone in the left kidney present.    He followed up in the office in March 2025 with intermittent left flank discomfort.    Due to intermittent left flank discomfort, patient opted to pursue elective left-sided ureteroscopy.  He was consented for cystoscopy, left retrograde pyelogram, left ureteroscopy, laser lithotripsy, stone basket traction, left ureteral stent placement.    We discussed ureteroscopy procedure.    I explained that ureteroscopy consisted of patient going to sleep and having scope initially placed into the urethra and bladder.  From there, x-rays would be shot in the ureter and kidney in order to assess for safety wire placement and stone location.  At that point, if possible, a smaller scope would be placed into the ureter and/or kidney to look for stones.  Once the stones were located, they would either be removed with a basket, or lasered into smaller pieces.  Once they were broken up into smaller pieces, they would either be \"dusted\" into very small fragments that would pass on their own or \"fragmented\" into slightly larger pieces that would be able to be removed in their entirety with the basket.    I explained that after the procedure, most patients needed placement of a ureteral stent.  I explained that ureteral stent is essentially a straw with 2 curly ends. One curl is in " the kidney and the other curl is in the bladder.  The stent would allow urine to safely pass from the kidney to the bladder without obstruction.  I explained that in the vast majority of circumstances, the placement of the ureteral stent was not permanent.  I explained that sometimes we are able to leave a string coming out of the end of the stent which comes out of the urethra.  I explained that these types of patients will get instructions in their discharge packet which tells them when to pull the string and remove the stent in its entirety.  I also explained that there are circumstances where we are not able to do this and there is no string coming off the stent.  In these cases, the patient's would need to come to the office for a quick 2-minute procedure called cystoscopy, removal of ureteral stent.    I also explained that there are unfortunately times where we are not able to get up with a smaller scope into the ureter.  In the circumstances, we place ureteral stent and have patient come back to the OR a few weeks later so that the ureter is more dilated and can better tolerate a scope.    I explained that based on data from the urine culture and the appearance of the urine during the case, the patient may or may not be discharged with antibiotics after the procedure.    I did explain that having ureteral stent in place can be quite uncomfortable for some patients.  I explained that I do not routinely give patient's narcotics for this type of procedure.  The patient verbalized understanding.  I did state that I often prescribe medications to help with stent discomfort including but not limited to: Daily tamsulosin 0.4 mg, daily as needed oxybutynin 5 mg XR, as needed diclofenac 50 mg twice daily.  I also told the patient that I often have patients use over-the-counter Tylenol around-the-clock for the first few days.  I also explained the most important thing for pain often is staying very well-hydrated and  that patients should plan to drink plenty of water after the procedure.    I explained that it can be normal to have blood in urine after this procedure.  I explained that as long as the urine was lighter than fruit punch and that there were no blood clots being passed, and that the patient was able to urinate, nothing urgently needed to be done about blood in the urine.  I did explain that it was very important to stay hydrated after the procedure.    We also went over the risk of infection from the procedure.  I explained that if the urine did appear to have a very infected appearance, there are times where we have to simply place a ureteral stent and defer definitive stone management a few weeks later after the patient gets additional antibiotics.  I also explained that there is a low, but nonzero risk of developing a serious infection after stone treatment which would potentially require hospitalization and IV antibiotics.    I also explained that there was a risk of injury to urethra, bladder, ureter, kidneys during the procedure.  I explained that if there appeared to be any serious injury to the urethra bladder, we would likely just leave Huang catheter for a number of days.  I explained that if there appeared to be extensive injury to the ureter or kidney, we often would elect to leave a ureteral stent in place for a longer period of time.  I explained that in extremely rare circumstances, there would be a severe injury to the ureter or kidney which would actually require placement of percutaneous nephrostomy tube in order for proper drainage of the urine.    I also explained that there are of course risks to getting general anesthesia such as cardiac or pulmonary complications.  I also explained that there could be risk of developing blood clots.    Additionally, we did review alternative treatments which include extracorporal shockwave lithotripsy, percutaneous nephrolithotomy, observation.  We discussed  the risks and benefits of each of these strategies.        I explained to the patient that typically, stones in the kidney do not cause pain.  Therefore, even if he were to be able to remove the stone today, I cannot guarantee that it would get rid of his left flank pain.  I also explained that sometimes, hyperechoic regions in the kidney do not necessarily mean that a stone is present.  I explained that therefore, I may look in the left kidney today and not see any stone.  I also explained the patient that based on the presentation of his ureter, I would decide if he would be able to have a string on his stent or not.  He verbalized understanding.        PLAN:     Preop Ancef ordered  OR plan as above  Discharge home from PACU         [1]   Past Medical History:  Diagnosis Date    Anxiety and depression     Colon polyp     Depression     Esophageal reflux     Fracture of both hands     GERD (gastroesophageal reflux disease)     Hair loss     Heartburn     Hiatal hernia     Resolved 7/24/2015     Kidney stone     Night sweats     Renal cyst     Sleep disturbance     Starting and stopping of urinary stream during micturition     Stomach problems     Weight gain    [2]   Past Surgical History:  Procedure Laterality Date    APPENDECTOMY  1984    COLONOSCOPY      ESOPHAGOGASTRODUODENOSCOPY      Diagnostic     FL RETROGRADE PYELOGRAM  01/08/2025    HAND SURGERY      Right in 1998 and left in 1991     NOSE SURGERY      Nasal septal deviation repair     MT CYSTO/URETERO W/LITHOTRIPSY &INDWELL STENT INSRT Left 01/08/2025    Procedure: CYSTOSCOPY URETEROSCOPY WITH LITHOTRIPSY HOLMIUM LASER, BASKET STONE EXTRACTION, RETROGRADE PYELOGRAM AND INSERTION STENT URETERAL;  Surgeon: Félix Arce MD;  Location: [facility] OR;  Service: Urology   [3]   Allergies  Allergen Reactions    Tramadol Hives    Clindamycin Rash     Category: Allergy;    [4]   Social History  Tobacco Use    Smoking status: Former     Average packs/day: 1  pack/day for 28.0 years (28.0 ttl pk-yrs)     Types: Cigarettes     Start date: 1991     Passive exposure: Past    Smokeless tobacco: Never   Vaping Use    Vaping status: Never Used   Substance Use Topics    Alcohol use: Yes     Alcohol/week: 15.0 standard drinks of alcohol     Types: 15 Standard drinks or equivalent per week     Comment: weekends    Drug use: Not Currently     Comment: Quit - As per Roeribrianna    [5]   Social History  Tobacco Use   Smoking Status Former    Average packs/day: 1 pack/day for 28.0 years (28.0 ttl pk-yrs)    Types: Cigarettes    Start date: 1991    Passive exposure: Past   Smokeless Tobacco Never   [6]   Family History  Problem Relation Name Age of Onset    No Known Problems Mother      Diabetes Father      Diabetes Maternal Uncle      Heart disease Maternal Grandmother      Diabetes Maternal Grandmother      Pancreatic cancer Maternal Grandmother      Colon cancer Maternal Grandfather      Dementia Paternal Grandmother

## 2025-06-27 NOTE — TELEPHONE ENCOUNTER
Patient called to request an appointment for 7/11 later in the day. Advised there are no openings - he is having a hard time getting out of work earlier.    Patient will keep appointment 7/11 at 11:40 just FYI.    Patient call back if needed is [phone]

## 2025-06-27 NOTE — ANESTHESIA POSTPROCEDURE EVALUATION
Post-Op Assessment Note    Last Filed PACU Vitals:  Vitals Value Taken Time   Temp 97.6 °F (36.4 °C) 06/26/25 11:30   Pulse 76 06/26/25 11:30   /67 06/26/25 11:30   Resp 18 06/26/25 11:30   SpO2 95 % 06/26/25 11:30       Modified Santa:     Vitals Value Taken Time   Activity 2 06/26/25 11:30   Respiration 2 06/26/25 11:30   Circulation 2 06/26/25 11:30   Consciousness 2 06/26/25 11:30   Oxygen Saturation 2 06/26/25 11:30     Modified Santa Score: 10

## 2025-07-03 ENCOUNTER — NURSE TRIAGE (OUTPATIENT)
Dept: OTHER | Facility: OTHER | Age: 50
End: 2025-07-03

## 2025-07-03 DIAGNOSIS — T83.84XA PAIN DUE TO URETERAL STENT, INITIAL ENCOUNTER (HCC): Primary | ICD-10-CM

## 2025-07-03 RX ORDER — TAMSULOSIN HYDROCHLORIDE 0.4 MG/1
0.4 CAPSULE ORAL
Qty: 30 CAPSULE | Refills: 0 | Status: SHIPPED | OUTPATIENT
Start: 2025-07-03 | End: 2025-07-03 | Stop reason: SDUPTHER

## 2025-07-03 RX ORDER — PHENAZOPYRIDINE HYDROCHLORIDE 200 MG/1
200 TABLET, FILM COATED ORAL 3 TIMES DAILY PRN
Qty: 20 TABLET | Refills: 0 | Status: SHIPPED | OUTPATIENT
Start: 2025-07-03 | End: 2025-07-03 | Stop reason: SDUPTHER

## 2025-07-03 RX ORDER — TAMSULOSIN HYDROCHLORIDE 0.4 MG/1
0.4 CAPSULE ORAL
Qty: 30 CAPSULE | Refills: 0 | Status: SHIPPED | OUTPATIENT
Start: 2025-07-03

## 2025-07-03 RX ORDER — PHENAZOPYRIDINE HYDROCHLORIDE 200 MG/1
200 TABLET, FILM COATED ORAL 3 TIMES DAILY PRN
Qty: 20 TABLET | Refills: 0 | Status: SHIPPED | OUTPATIENT
Start: 2025-07-03

## 2025-07-03 NOTE — TELEPHONE ENCOUNTER
"REASON FOR CONVERSATION: Urinary Frequency    SYMPTOMS: Patient status post left ureteroscopy at [place] on 6/26/2025. Patient states he started with severe urinary frequency and burning pain yesterday and it is worse today. When he goes to urinate only drops are coming out.     OTHER HEALTH INFORMATION: history of stones     PROTOCOL DISPOSITION: See PCP Within 24 Hours    CARE ADVICE PROVIDED: On call provider - start flomax for difficulty going and pyridium for burning. should improve (but not resolve) with that and will resolve once the stent is removed next week - RN advised to stay hydrated.     PRACTICE FOLLOW-UP: NA          Reason for Disposition   Urinating more frequently than usual (i.e., frequency) OR new-onset of the feeling of an urgent need to urinate (i.e., urgency)    Answer Assessment - Initial Assessment Questions  1. SYMPTOM: \"What's the main symptom you're concerned about?\" (e.g., frequency, incontinence)        Frequency     2. ONSET: \"When did the  pain and frequency  start?\"        Yesterday     3. PAIN: \"Is there any pain?\" If Yes, ask: \"How bad is it?\" (Scale: 1-10; mild, moderate, severe)        Unable to measure pain, states it is a burning.     4. CAUSE: \"What do you think is causing the symptoms?\"        status post left ureteroscopy at [place] on 6/26/2025    5. OTHER SYMPTOMS: \"Do you have any other symptoms?\" (e.g., blood in urine, fever, flank pain, pain with urination)       Blood in urine    Protocols used: Urinary Symptoms-Adult-AH    "

## 2025-07-03 NOTE — TELEPHONE ENCOUNTER
"Regarding: Cloudy urine / Burning and Urinary frequency  ----- Message from Duong DUMONT sent at 7/3/2025  6:30 PM EDT -----  \"My 's urine looks muddy and he is experiencing burning and urinary frequency.\"    "

## 2025-07-05 LAB
CALCIUM OXALATE DIHYDRATE MFR STONE IR: 40 %
COLOR STONE: NORMAL
COM MFR STONE: 40 %
HYDROXYAPATITE 24H ENGDIFF UR: 20 %
SIZE STONE: NORMAL MM
SPEC SOURCE SUBJ: NORMAL
STONE ANALYSIS-IMP: NORMAL
WT STONE: 15 MG

## 2025-07-07 ENCOUNTER — APPOINTMENT (OUTPATIENT)
Dept: LAB | Facility: CLINIC | Age: 50
End: 2025-07-07
Payer: COMMERCIAL

## 2025-07-07 DIAGNOSIS — Z12.5 SCREENING FOR PROSTATE CANCER: ICD-10-CM

## 2025-07-07 LAB
PSA FREE MFR SERPL: 19.35 %
PSA FREE SERPL-MCNC: 0.22 NG/ML
PSA SERPL-MCNC: 1.14 NG/ML (ref 0–4)

## 2025-07-07 PROCEDURE — 84154 ASSAY OF PSA FREE: CPT

## 2025-07-07 PROCEDURE — 84153 ASSAY OF PSA TOTAL: CPT

## 2025-07-07 PROCEDURE — 36415 COLL VENOUS BLD VENIPUNCTURE: CPT

## 2025-07-10 NOTE — ASSESSMENT & PLAN NOTE
- S/p left URS on 6/26/25 for small intrarenal stone which had to be terminated early due to anesthesia issues. Only stent was placed  - Discussed options of stent removal today vs attempting another ureteroscopy to remove remaining stone burden. He wishes to proceed with stent removal. See procedure note  - Start Keflex x 3 days  - F/u in 8-12 weeks with KUB and US.   Orders:    POCT urine dip    XR abdomen 1 view kub; Future    US kidney and bladder; Future    cephalexin (KEFLEX) 500 mg capsule; Take 1 capsule (500 mg total) by mouth every 12 (twelve) hours for 3 days

## 2025-07-10 NOTE — PROGRESS NOTES
Name: Antonio Garrison      : 1975      MRN: [id number]  Encounter Provider: Sarah Schnitzler, PA-C  Encounter Date: 2025   Encounter department: [facility] UROLOGY [place]  :  Assessment & Plan  Calculus of kidney  - S/p left URS on 25 for small intrarenal stone which had to be terminated early due to anesthesia issues. Only stent was placed  - Discussed options of stent removal today vs attempting another ureteroscopy to remove remaining stone burden. He wishes to proceed with stent removal. See procedure note  - Start Keflex x 3 days  - F/u in 8-12 weeks with KUB and US.   Orders:    POCT urine dip    XR abdomen 1 view kub; Future    US kidney and bladder; Future    cephalexin (KEFLEX) 500 mg capsule; Take 1 capsule (500 mg total) by mouth every 12 (twelve) hours for 3 days        History of Present Illness   Antonio Garrison is a 50 y.o. male who presents for follow up of kidney stone. He underwent left ureteroscopy in 2025 for obstructing ureteral stone. Stent was removed in the office on 25. Follow up imaging after stent removal showed a 3-4 mm nonobstructive left intrarenal calculus. Patient elected to undergo ureteroscopy for this. This was attempted on 25 however aborted early due to anesthesia issues. Ureteral stent was placed. He presents today to discuss removing his stent vs rescheduling another surgery.       AUA SYMPTOM SCORE      Flowsheet Row Most Recent Value   AUA SYMPTOM SCORE    How often have you had a sensation of not emptying your bladder completely after you finished urinating? 0 (P)     How often have you had to urinate again less than two hours after you finished urinating? 0 (P)     How often have you found you stopped and started again several times when you urinate? 0 (P)     How often have you found it difficult to postpone urination? 0 (P)     How often have you had a weak urinary stream? 0 (P)     How often have you had to push or  strain to begin urination? 0 (P)     How many times did you most typically get up to urinate from the time you went to bed at night until the time you got up in the morning? 0 (P)     Quality of Life: If you were to spend the rest of your life with your urinary condition just the way it is now, how would you feel about that? 0 (P)     AUA SYMPTOM SCORE 0 (P)            Review of Systems   Constitutional:  Negative for chills and fever.   Respiratory:  Negative for shortness of breath.    Cardiovascular:  Negative for chest pain.   Gastrointestinal:  Negative for abdominal pain.   Genitourinary:  Negative for difficulty urinating, dysuria, flank pain, frequency, hematuria and urgency.   Neurological:  Negative for dizziness.        Objective   There were no vitals taken for this visit.    Physical Exam  Constitutional:       Appearance: Normal appearance.   HENT:      Head: Normocephalic and atraumatic.      Right Ear: External ear normal.      Left Ear: External ear normal.      Nose: Nose normal.     Eyes:      General: No scleral icterus.     Conjunctiva/sclera: Conjunctivae normal.       Cardiovascular:      Pulses: Normal pulses.   Pulmonary:      Effort: Pulmonary effort is normal.     Musculoskeletal:         General: Normal range of motion.      Cervical back: Normal range of motion.     Skin:     General: Skin is warm and dry.     Neurological:      General: No focal deficit present.      Mental Status: He is alert and oriented to person, place, and time.     Psychiatric:         Mood and Affect: Mood normal.         Behavior: Behavior normal.         Thought Content: Thought content normal.         Judgment: Judgment normal.          Results   Lab Results   Component Value Date    PSA 1.137 07/07/2025    PSA 1.357 05/30/2025    PSA 0.7 04/13/2023     Lab Results   Component Value Date    GLUCOSE 86 08/11/2015    CALCIUM 9.4 01/10/2025     08/11/2015    K 3.6 01/10/2025    CO2 28 01/10/2025      01/10/2025    BUN 12 01/10/2025    CREATININE 0.69 01/10/2025     Lab Results   Component Value Date    WBC 10.28 (H) 01/10/2025    HGB 13.4 01/10/2025    HCT 41.1 01/10/2025    MCV 90 01/10/2025     01/10/2025       Office Urine Dip  No results found for this or any previous visit (from the past hour).               Cystoscopy     Date/Time  7/11/2025 11:40 AM     Performed by  Sarah Schnitzler, PA-C   Authorized by  Sarah Schnitzler, PA-C       [place] Protocol:  procedure performed by consultantConsent: Verbal consent obtained. Written consent obtained  Risks and benefits: risks, benefits and alternatives were discussed  Consent given by: patient  Timeout called at: 7/11/2025 12:03 PM.  Patient understanding: patient states understanding of the procedure being performed  Patient consent: the patient's understanding of the procedure matches consent given  Procedure consent: procedure consent matches procedure scheduled  Patient identity confirmed: verbally with patient and provided demographic data      Procedure Details:  Procedure type: simple removal of a foreign body, stone, or stent    Patient tolerance: Patient tolerated the procedure well with no immediate complications    Additional Procedure Details: Patient cleaned and draped in sterile fashion. Urojet injected into urethra. Meatal stenosis noted and was able to insert cystoscope with gentle pressure.  Cystoscope advanced into bladder with visualization and then subsequent removal of ureteral stent using stent grasper device.

## 2025-07-11 ENCOUNTER — PROCEDURE VISIT (OUTPATIENT)
Dept: UROLOGY | Facility: CLINIC | Age: 50
End: 2025-07-11
Payer: COMMERCIAL

## 2025-07-11 VITALS
HEART RATE: 76 BPM | SYSTOLIC BLOOD PRESSURE: 122 MMHG | TEMPERATURE: 97.7 F | WEIGHT: 289 LBS | HEIGHT: 70 IN | BODY MASS INDEX: 41.37 KG/M2 | OXYGEN SATURATION: 99 % | DIASTOLIC BLOOD PRESSURE: 80 MMHG

## 2025-07-11 DIAGNOSIS — N20.0 CALCULUS OF KIDNEY: Primary | ICD-10-CM

## 2025-07-11 LAB
SL AMB  POCT GLUCOSE, UA: NORMAL
SL AMB LEUKOCYTE ESTERASE,UA: NORMAL
SL AMB POCT BILIRUBIN,UA: NORMAL
SL AMB POCT BLOOD,UA: NORMAL
SL AMB POCT CLARITY,UA: CLEAR
SL AMB POCT COLOR,UA: YELLOW
SL AMB POCT KETONES,UA: NORMAL
SL AMB POCT NITRITE,UA: NORMAL
SL AMB POCT PH,UA: 6.5
SL AMB POCT SPECIFIC GRAVITY,UA: 1.01
SL AMB POCT URINE PROTEIN: NORMAL
SL AMB POCT UROBILINOGEN: 0.2

## 2025-07-11 PROCEDURE — 81002 URINALYSIS NONAUTO W/O SCOPE: CPT | Performed by: PHYSICIAN ASSISTANT

## 2025-07-11 PROCEDURE — 99024 POSTOP FOLLOW-UP VISIT: CPT | Performed by: PHYSICIAN ASSISTANT

## 2025-07-11 PROCEDURE — 52310 CYSTOSCOPY AND TREATMENT: CPT | Performed by: PHYSICIAN ASSISTANT

## 2025-07-11 RX ORDER — CEPHALEXIN 500 MG/1
500 CAPSULE ORAL EVERY 12 HOURS SCHEDULED
Qty: 6 CAPSULE | Refills: 0 | Status: SHIPPED | OUTPATIENT
Start: 2025-07-11 | End: 2025-07-11

## 2025-07-11 RX ORDER — CEPHALEXIN 500 MG/1
500 CAPSULE ORAL EVERY 12 HOURS SCHEDULED
Qty: 6 CAPSULE | Refills: 0 | Status: SHIPPED | OUTPATIENT
Start: 2025-07-11 | End: 2025-07-14

## 2025-08-05 DIAGNOSIS — K21.9 CHRONIC GERD: ICD-10-CM

## 2025-08-05 RX ORDER — PANTOPRAZOLE SODIUM 40 MG/1
40 TABLET, DELAYED RELEASE ORAL DAILY
Qty: 90 TABLET | Refills: 0 | Status: SHIPPED | OUTPATIENT
Start: 2025-08-05

## (undated) DEVICE — SPONGE 4 X 4 XRAY 16 PLY STRL LF RFD

## (undated) DEVICE — UROLOGIC DRAIN BAG: Brand: UNBRANDED

## (undated) DEVICE — PACK TUR

## (undated) DEVICE — INVIEW CLEAR LEGGINGS: Brand: CONVERTORS

## (undated) DEVICE — GLOVE INDICATOR PI UNDERGLOVE SZ 8 BLUE

## (undated) DEVICE — GUIDEWIRE STRGHT TIP 0.035 IN  SOLO PLUS

## (undated) DEVICE — SINGLE-USE DIGITAL FLEXIBLE URETEROSCOPE: Brand: APTRA

## (undated) DEVICE — SHEATH URETERAL ACCESS 12/14FR 35CM PROXIS

## (undated) DEVICE — LUBRICANT JELLY SURGILUBE TUBE 4OZ FLIP TOP

## (undated) DEVICE — 4-PORT MANIFOLD: Brand: NEPTUNE 2

## (undated) DEVICE — BASKET SPECIMEN RETRIVAL 1.9FR 120CM

## (undated) DEVICE — CATH URETERAL 5FR X 70 CM FLEX TIP POLYUR BARD

## (undated) DEVICE — FIBER LASER HOLMIUM 272MICRON HOL1020F

## (undated) DEVICE — SHEATH URETERAL ACCESS 10/12FR 45CM PROXIS

## (undated) DEVICE — GLOVE SRG BIOGEL 7

## (undated) DEVICE — GLOVE SRG BIOGEL ECLIPSE 7.5

## (undated) DEVICE — SHEATH URETERAL ACCESS 12/14FR 45CM PROXIS

## (undated) DEVICE — CHLORHEXIDINE 4PCT 4 OZ